# Patient Record
Sex: FEMALE | Race: OTHER | Employment: OTHER | URBAN - METROPOLITAN AREA
[De-identification: names, ages, dates, MRNs, and addresses within clinical notes are randomized per-mention and may not be internally consistent; named-entity substitution may affect disease eponyms.]

---

## 2017-02-06 RX ORDER — PAROXETINE 10 MG/1
TABLET, FILM COATED ORAL
Qty: 30 TABLET | Refills: 5 | Status: SHIPPED | OUTPATIENT
Start: 2017-02-06 | End: 2017-03-07

## 2017-02-15 RX ORDER — PRAMIPEXOLE DIHYDROCHLORIDE 0.12 MG/1
TABLET ORAL
Qty: 30 TABLET | Refills: 5 | Status: SHIPPED | OUTPATIENT
Start: 2017-02-15 | End: 2017-03-14

## 2017-02-15 NOTE — TELEPHONE ENCOUNTER
LOV:8-19  Last Filled:10-27, #60 with 2 refills  Labs:   Future Appointments  Date Time Provider Dawn Arias   3/14/2017 10:40 AM Torri Baumann MD [facility] [place]       Please Advise

## 2017-02-16 RX ORDER — PRAMIPEXOLE DIHYDROCHLORIDE 0.12 MG/1
TABLET ORAL
Qty: 30 TABLET | Refills: 5 | OUTPATIENT
Start: 2017-02-16

## 2017-02-20 RX ORDER — METOPROLOL SUCCINATE 25 MG/1
TABLET, EXTENDED RELEASE ORAL
Qty: 90 TABLET | Refills: 1 | Status: SHIPPED | OUTPATIENT
Start: 2017-02-20 | End: 2017-10-09

## 2017-03-01 ENCOUNTER — HOSPITAL ENCOUNTER (OUTPATIENT)
Dept: MAMMOGRAPHY | Facility: HOSPITAL | Age: 57
Discharge: HOME | End: 2017-03-01
Attending: OBSTETRICS & GYNECOLOGY
Payer: MEDICARE

## 2017-03-01 ENCOUNTER — HOSPITAL ENCOUNTER (OUTPATIENT)
Dept: ULTRASOUND IMAGING | Facility: HOSPITAL | Age: 57
Discharge: HOME | End: 2017-03-01
Attending: OBSTETRICS & GYNECOLOGY
Payer: MEDICARE

## 2017-03-01 DIAGNOSIS — D24.2 FIBROADENOMA OF BREAST, LEFT: ICD-10-CM

## 2017-03-01 PROCEDURE — 76642 ULTRASOUND BREAST LIMITED: CPT

## 2017-03-01 PROCEDURE — 77066 DX MAMMO INCL CAD BI: CPT

## 2017-03-04 ENCOUNTER — HOSPITAL ENCOUNTER (OUTPATIENT)
Age: 57
Discharge: HOME | End: 2017-03-04
Payer: MEDICARE

## 2017-03-04 VITALS
WEIGHT: 165 LBS | DIASTOLIC BLOOD PRESSURE: 78 MMHG | BODY MASS INDEX: 29.23 KG/M2 | RESPIRATION RATE: 18 BRPM | OXYGEN SATURATION: 97 % | TEMPERATURE: 98 F | SYSTOLIC BLOOD PRESSURE: 148 MMHG | HEIGHT: 63 IN | HEART RATE: 55 BPM

## 2017-03-04 DIAGNOSIS — B30.9 ACUTE VIRAL CONJUNCTIVITIS OF RIGHT EYE: ICD-10-CM

## 2017-03-04 PROCEDURE — 99212 OFFICE O/P EST SF 10 MIN: CPT

## 2017-03-04 PROCEDURE — 99203 OFFICE O/P NEW LOW 30 MIN: CPT

## 2017-03-04 RX ORDER — DIPHENHYDRAMINE HCL 25 MG
CAPSULE ORAL
Qty: 1 EACH | Refills: 0 | Status: SHIPPED | OUTPATIENT
Start: 2017-03-04 | End: 2017-06-06

## 2017-03-04 NOTE — ED INITIAL ASSESSMENT (HPI)
Right eye redness and painful onset this morning. Denies any injury/trauma to the eye. Denies any drainage. Woke up with pain in the right eye. Uses glasses no contacts.

## 2017-03-04 NOTE — ED PROVIDER NOTES
Patient Seen in: [facility] Immediate Care In [address] [place] [address]    History   Patient presents with:   Eye Visual Problem (opthalmic)    Stated Complaint: eye pain    HPI    Patient is a 80-year-old female with a history of hypertension and fibromyalgia who mouth. take 1 tablet by oral mouth daily   aspirin (ASPIR-81) 81 MG Oral Tab EC,  Take  by mouth.  take 1 tablet (81MG)  by oral route  every day       Family History   Problem Relation Age of Onset   • Hypertension Father    • Hypertension Mother    • Canc conjunctivitis.   We will treat with artificial tears and have the patient follow-up with her PMD.      Disposition and Plan     Clinical Impression:  Acute viral conjunctivitis of right eye    Disposition:  Discharge    Follow-up:  Milton Man MD  1200

## 2017-03-06 RX ORDER — PANTOPRAZOLE SODIUM 40 MG/1
TABLET, DELAYED RELEASE ORAL
Qty: 90 TABLET | Refills: 1 | Status: SHIPPED | OUTPATIENT
Start: 2017-03-06 | End: 2017-09-06

## 2017-03-07 ENCOUNTER — OFFICE VISIT (OUTPATIENT)
Dept: INTERNAL MEDICINE CLINIC | Facility: CLINIC | Age: 57
End: 2017-03-07

## 2017-03-07 VITALS
WEIGHT: 182.13 LBS | HEART RATE: 57 BPM | SYSTOLIC BLOOD PRESSURE: 135 MMHG | TEMPERATURE: 98 F | DIASTOLIC BLOOD PRESSURE: 77 MMHG | BODY MASS INDEX: 32 KG/M2

## 2017-03-07 DIAGNOSIS — B30.9 ACUTE VIRAL CONJUNCTIVITIS OF RIGHT EYE: Primary | ICD-10-CM

## 2017-03-07 DIAGNOSIS — H53.8 BLURRED VISION: ICD-10-CM

## 2017-03-07 DIAGNOSIS — I10 ESSENTIAL HYPERTENSION: ICD-10-CM

## 2017-03-07 PROCEDURE — G0463 HOSPITAL OUTPT CLINIC VISIT: HCPCS | Performed by: INTERNAL MEDICINE

## 2017-03-07 PROCEDURE — 99214 OFFICE O/P EST MOD 30 MIN: CPT | Performed by: INTERNAL MEDICINE

## 2017-03-07 NOTE — PROGRESS NOTES
HPI:    Patient ID: Nivia Virk is a 64year old female. Patient presents for a re-evaluation after UC visit on 3/4/2017.  As per UC notes: Patient is a 22-year-old female with a history of hypertension and fibromyalgia who complains of 1 day of right Treatments tried: Artificial tears. Review of Systems   Constitutional: Negative for fever and chills. Eyes: Negative for blurred vision, double vision, pain, discharge and redness.         Pt feels like she has a \"foreign body\" in her eyes   Resp 0.125 MG Oral Tab Take one PO Q HS. Disp: 30 tablet Rfl: 5   Pravastatin Sodium 20 MG Oral Tab TAKE 1 TABLET BY MOUTH NIGHTLY. Disp: 90 tablet Rfl: 1   CICLOPIROX 8 % External Solution APPLY 1 APPLICATION TOPICALLY 3 (THREE) TIMES A WEEK.  Disp: 6.6 mL Rfl: eye exam.   Plan:  -Referred patient to Dr. Gracy Daniel, Ophthalmology for further evaluation.       (Berwyn Bence) Essential hypertension  On exam, blood pressure 135/77- controlled. Patient denies associated symptoms such as HA, N/V, or CP.  Patient is currently taki

## 2017-03-10 ENCOUNTER — TELEPHONE (OUTPATIENT)
Dept: OPHTHALMOLOGY | Facility: CLINIC | Age: 57
End: 2017-03-10

## 2017-03-10 NOTE — TELEPHONE ENCOUNTER
pts daughter Joe Mcguire called. Onset last Saturday for Red Eyes. She [facility] in [address] [place] [address] on 3/4/17. Was given artifical eye drops. Helped a little, but not better. Please advise.

## 2017-03-11 ENCOUNTER — OFFICE VISIT (OUTPATIENT)
Dept: OPHTHALMOLOGY | Facility: CLINIC | Age: 57
End: 2017-03-11

## 2017-03-11 DIAGNOSIS — H02.889 MGD (MEIBOMIAN GLAND DYSFUNCTION): Primary | ICD-10-CM

## 2017-03-11 DIAGNOSIS — IMO0002 NUCLEAR CATARACT OF BOTH EYES: ICD-10-CM

## 2017-03-11 PROCEDURE — 92014 COMPRE OPH EXAM EST PT 1/>: CPT | Performed by: OPHTHALMOLOGY

## 2017-03-11 PROCEDURE — 92015 DETERMINE REFRACTIVE STATE: CPT | Performed by: OPHTHALMOLOGY

## 2017-03-11 NOTE — PROGRESS NOTES
Felicitas Weclh is a 64year old female. HPI:     HPI     Consult    Additional comments: Referred by Dr. Brittanie Bennett           Comments   Patient is here for a follow up visit from Urgent Care in [address] [place] [address].   Patient was seen on 3/4/17 due to redness and eye pa Solution Put 1 drop in both eyes every hours while awake for 1-2 days Disp: 1 each Rfl: 0   METOPROLOL SUCCINATE ER 25 MG Oral Tablet 24 Hr TAKE 1 TABLET BY MOUTH ONCE DAILY.  Disp: 90 tablet Rfl: 1   Pramipexole Dihydrochloride 0.125 MG Oral Tab Take one P Follicles, Nasal/temp pinguecula, no injection, no jazz bengal staining     Nasal/temp pinguecula, no injection, no jazz bengal staining    Cornea Clear, no fluorescein stain, oily tear film Clear, no fluorescein stain, oily tear film    Anterior Chamber D

## 2017-03-11 NOTE — ASSESSMENT & PLAN NOTE
Patient is instructed to use warm compresses twice a day to both eyelids forever for ocular comfort. Use OTC artifical tears 2-3 times a day or as needed. May also use Zaditor or Alaway in both eyes up to twice a day for itching.

## 2017-03-11 NOTE — PATIENT INSTRUCTIONS
MGD (meibomian gland dysfunction)  Patient is instructed to use warm compresses twice a day to both eyelids forever for ocular comfort. Use OTC artifical tears 2-3 times a day or as needed.  May also use Zaditor or Alaway in both eyes up to twice a day for

## 2017-03-11 NOTE — ASSESSMENT & PLAN NOTE
Discussed early cataracts with patient. Told patient that cataracts are age appropriate and they are not surgical at this time. No treatment recommended at this time.

## 2017-03-14 ENCOUNTER — OFFICE VISIT (OUTPATIENT)
Dept: RHEUMATOLOGY | Facility: CLINIC | Age: 57
End: 2017-03-14

## 2017-03-14 VITALS
HEART RATE: 65 BPM | HEIGHT: 63 IN | BODY MASS INDEX: 32.11 KG/M2 | DIASTOLIC BLOOD PRESSURE: 67 MMHG | SYSTOLIC BLOOD PRESSURE: 144 MMHG | WEIGHT: 181.19 LBS

## 2017-03-14 DIAGNOSIS — M79.7 FIBROMYALGIA: Primary | ICD-10-CM

## 2017-03-14 DIAGNOSIS — M15.9 PRIMARY OSTEOARTHRITIS INVOLVING MULTIPLE JOINTS: ICD-10-CM

## 2017-03-14 DIAGNOSIS — G47.33 OBSTRUCTIVE SLEEP APNEA SYNDROME: ICD-10-CM

## 2017-03-14 PROCEDURE — G0463 HOSPITAL OUTPT CLINIC VISIT: HCPCS | Performed by: INTERNAL MEDICINE

## 2017-03-14 PROCEDURE — 99213 OFFICE O/P EST LOW 20 MIN: CPT | Performed by: INTERNAL MEDICINE

## 2017-03-14 RX ORDER — GABAPENTIN 400 MG/1
CAPSULE ORAL
Qty: 90 CAPSULE | Refills: 3 | Status: SHIPPED | OUTPATIENT
Start: 2017-03-14 | End: 2018-01-10

## 2017-03-14 NOTE — PROGRESS NOTES
HPI:    Patient ID: Мария Hancock is a 64year old female. HPI Comments: Mauro Dumas is a 51-year-old woman with mild osteoarthritis, rotator cuff tendonitis, depression, severe fatigue, and severe diffuse myofascial pain syndrome.     Lyrica up to 100 mg twic Neck Pain   Pertinent negatives include no chest pain or fever. Knee Pain   Pertinent negatives include no fever. Her past medical history is significant for osteoarthritis. Shoulder Pain   Pertinent negatives include no fever.  Her past medical histo sounds are normal. She exhibits no mass. There is no tenderness. There is no rebound and no guarding. Musculoskeletal: She exhibits no edema. She has severe diffuse myofascial discomfort to palpation. Shoulder motion is painful bilaterally.   There is

## 2017-04-12 RX ORDER — PRAVASTATIN SODIUM 20 MG
TABLET ORAL
Qty: 90 TABLET | Refills: 1 | Status: SHIPPED | OUTPATIENT
Start: 2017-04-12 | End: 2017-10-06

## 2017-06-06 ENCOUNTER — OFFICE VISIT (OUTPATIENT)
Dept: RHEUMATOLOGY | Facility: CLINIC | Age: 57
End: 2017-06-06

## 2017-06-06 ENCOUNTER — HOSPITAL ENCOUNTER (OUTPATIENT)
Dept: GENERAL RADIOLOGY | Age: 57
Discharge: HOME | End: 2017-06-06
Attending: INTERNAL MEDICINE | Admitting: INTERNAL MEDICINE
Payer: MEDICARE

## 2017-06-06 ENCOUNTER — HOSPITAL ENCOUNTER (OUTPATIENT)
Dept: GENERAL RADIOLOGY | Age: 57
Discharge: HOME | End: 2017-06-06
Attending: INTERNAL MEDICINE
Payer: MEDICARE

## 2017-06-06 VITALS
HEIGHT: 63 IN | HEART RATE: 62 BPM | WEIGHT: 182 LBS | DIASTOLIC BLOOD PRESSURE: 63 MMHG | TEMPERATURE: 98 F | BODY MASS INDEX: 32.25 KG/M2 | SYSTOLIC BLOOD PRESSURE: 135 MMHG

## 2017-06-06 DIAGNOSIS — M25.562 CHRONIC PAIN OF BOTH KNEES: ICD-10-CM

## 2017-06-06 DIAGNOSIS — M25.561 CHRONIC PAIN OF BOTH KNEES: ICD-10-CM

## 2017-06-06 DIAGNOSIS — M15.9 PRIMARY OSTEOARTHRITIS INVOLVING MULTIPLE JOINTS: Primary | ICD-10-CM

## 2017-06-06 DIAGNOSIS — G89.29 CHRONIC PAIN OF BOTH KNEES: ICD-10-CM

## 2017-06-06 DIAGNOSIS — M15.9 PRIMARY OSTEOARTHRITIS INVOLVING MULTIPLE JOINTS: ICD-10-CM

## 2017-06-06 DIAGNOSIS — M79.7 FIBROMYALGIA: ICD-10-CM

## 2017-06-06 PROCEDURE — G0463 HOSPITAL OUTPT CLINIC VISIT: HCPCS | Performed by: INTERNAL MEDICINE

## 2017-06-06 PROCEDURE — 73560 X-RAY EXAM OF KNEE 1 OR 2: CPT | Performed by: INTERNAL MEDICINE

## 2017-06-06 PROCEDURE — 99213 OFFICE O/P EST LOW 20 MIN: CPT | Performed by: INTERNAL MEDICINE

## 2017-06-06 NOTE — PROGRESS NOTES
HPI:    Patient ID: Ayanna Jacques is a 64year old female. HPI Comments: Mino Jones is a 15-year-old woman with mild osteoarthritis, rotator cuff tendonitis, depression, severe fatigue, and severe diffuse myofascial pain syndrome.     Lyrica up to 100 mg twic pain, chest pain, chills, diaphoresis, fever or rash. Review of Systems   Constitutional: Positive for fatigue. Negative for fever, chills and diaphoresis. Respiratory: Negative for shortness of breath. Cardiovascular: Negative for chest pain. Skin: No rash noted. ASSESSMENT/PLAN:   No orders of the defined types were placed in this encounter. 1. Osteoarthritis. She will continue to increase exercises that she learned in physical therapy.  She will increase her time on the bike

## 2017-06-10 ENCOUNTER — OFFICE VISIT (OUTPATIENT)
Dept: INTERNAL MEDICINE CLINIC | Facility: CLINIC | Age: 57
End: 2017-06-10

## 2017-06-10 VITALS
RESPIRATION RATE: 18 BRPM | DIASTOLIC BLOOD PRESSURE: 69 MMHG | HEIGHT: 63 IN | SYSTOLIC BLOOD PRESSURE: 134 MMHG | BODY MASS INDEX: 32.07 KG/M2 | HEART RATE: 58 BPM | TEMPERATURE: 98 F | WEIGHT: 181 LBS

## 2017-06-10 DIAGNOSIS — Z91.09 ENVIRONMENTAL ALLERGIES: Primary | ICD-10-CM

## 2017-06-10 DIAGNOSIS — E66.01 MORBID OBESITY, UNSPECIFIED OBESITY TYPE (HCC): ICD-10-CM

## 2017-06-10 PROCEDURE — G0463 HOSPITAL OUTPT CLINIC VISIT: HCPCS | Performed by: INTERNAL MEDICINE

## 2017-06-10 PROCEDURE — 99214 OFFICE O/P EST MOD 30 MIN: CPT | Performed by: INTERNAL MEDICINE

## 2017-06-10 RX ORDER — LEVOCETIRIZINE DIHYDROCHLORIDE 5 MG/1
5 TABLET, FILM COATED ORAL EVERY EVENING
Qty: 30 TABLET | Refills: 1 | Status: SHIPPED | OUTPATIENT
Start: 2017-06-10 | End: 2017-10-09

## 2017-06-10 NOTE — PROGRESS NOTES
HPI:    Patient ID: Felicitas Welch is a 64year old female. Sore Throat   This is a new problem. The current episode started 1 to 4 weeks ago. There has been no fever. Pertinent negatives include no diarrhea or shortness of breath.  She has tried nothing Dihydrochloride (XYZAL) 5 MG Oral Tab Take 1 tablet (5 mg total) by mouth every evening. Disp: 30 tablet Rfl: 1   PRAVASTATIN SODIUM 20 MG Oral Tab TAKE 1 TABLET BY MOUTH NIGHTLY. Disp: 90 tablet Rfl: 1   gabapentin 400 MG Oral Cap Take one PO Q HS.  Disp: vitals reviewed. 06/10/17  1148   BP: 134/69   Pulse: 58   Temp: 98 °F (36.7 °C)   TempSrc: Oral   Resp: 18   Height: 5' 3\" (1.6 m)   Weight: 181 lb (82.101 kg)         Body mass index is 32.07 kg/(m^2).            ASSESSMENT/PLAN:   (Z91.09) Environ

## 2017-06-15 ENCOUNTER — OFFICE VISIT (OUTPATIENT)
Dept: PHYSICAL THERAPY | Age: 57
End: 2017-06-15
Attending: INTERNAL MEDICINE
Payer: MEDICARE

## 2017-06-15 DIAGNOSIS — M15.9 PRIMARY OSTEOARTHRITIS INVOLVING MULTIPLE JOINTS: Primary | ICD-10-CM

## 2017-06-15 PROCEDURE — 97116 GAIT TRAINING THERAPY: CPT

## 2017-06-15 PROCEDURE — 97162 PT EVAL MOD COMPLEX 30 MIN: CPT

## 2017-06-22 ENCOUNTER — OFFICE VISIT (OUTPATIENT)
Dept: PHYSICAL THERAPY | Age: 57
End: 2017-06-22
Attending: INTERNAL MEDICINE
Payer: MEDICARE

## 2017-06-22 DIAGNOSIS — M15.9 PRIMARY OSTEOARTHRITIS INVOLVING MULTIPLE JOINTS: Primary | ICD-10-CM

## 2017-06-22 PROCEDURE — 97110 THERAPEUTIC EXERCISES: CPT

## 2017-06-22 NOTE — PROGRESS NOTES
Dx: Primary osteoarthritis involving multiple joints (M15.0)  Authorized # of Visits:  2/10         Next MD visit: none scheduled  Fall Risk: standard         Precautions: n/a           Medication Changes since last visit?: No  Subjective: PAS 6/10.  Pt rep

## 2017-06-27 ENCOUNTER — OFFICE VISIT (OUTPATIENT)
Dept: PHYSICAL THERAPY | Age: 57
End: 2017-06-27
Attending: INTERNAL MEDICINE
Payer: MEDICARE

## 2017-06-27 PROCEDURE — 97110 THERAPEUTIC EXERCISES: CPT

## 2017-06-27 NOTE — PROGRESS NOTES
Dx: Primary osteoarthritis involving multiple joints (M15.0)  Authorized # of Visits:  3/10         Next MD visit: (7/6/17)  Fall Risk: standard         Precautions: n/a           Medication Changes since last visit?: No  Subjective: PAS 6/10.  Pt reports n

## 2017-06-29 ENCOUNTER — OFFICE VISIT (OUTPATIENT)
Dept: PHYSICAL THERAPY | Age: 57
End: 2017-06-29
Attending: INTERNAL MEDICINE
Payer: MEDICARE

## 2017-06-29 DIAGNOSIS — M15.9 PRIMARY OSTEOARTHRITIS INVOLVING MULTIPLE JOINTS: ICD-10-CM

## 2017-06-29 PROCEDURE — 97110 THERAPEUTIC EXERCISES: CPT

## 2017-06-29 NOTE — PROGRESS NOTES
Dx: Primary osteoarthritis involving multiple joints (M15.0)  Authorized # of Visits:  4/10         Next MD visit: (7/6/17)  Fall Risk: standard         Precautions: n/a           Medication Changes since last visit?: No  Subjective: Pt reports a \"bad day

## 2017-07-06 ENCOUNTER — OFFICE VISIT (OUTPATIENT)
Dept: RHEUMATOLOGY | Facility: CLINIC | Age: 57
End: 2017-07-06

## 2017-07-06 ENCOUNTER — OFFICE VISIT (OUTPATIENT)
Dept: PHYSICAL THERAPY | Age: 57
End: 2017-07-06
Attending: INTERNAL MEDICINE
Payer: MEDICARE

## 2017-07-06 VITALS
HEART RATE: 76 BPM | HEIGHT: 63 IN | SYSTOLIC BLOOD PRESSURE: 142 MMHG | DIASTOLIC BLOOD PRESSURE: 74 MMHG | TEMPERATURE: 98 F | WEIGHT: 182 LBS | BODY MASS INDEX: 32.25 KG/M2

## 2017-07-06 DIAGNOSIS — M79.7 FIBROMYALGIA: Primary | ICD-10-CM

## 2017-07-06 DIAGNOSIS — R53.83 FATIGUE, UNSPECIFIED TYPE: ICD-10-CM

## 2017-07-06 PROCEDURE — 99214 OFFICE O/P EST MOD 30 MIN: CPT | Performed by: INTERNAL MEDICINE

## 2017-07-06 PROCEDURE — G0463 HOSPITAL OUTPT CLINIC VISIT: HCPCS | Performed by: INTERNAL MEDICINE

## 2017-07-06 PROCEDURE — 97110 THERAPEUTIC EXERCISES: CPT

## 2017-07-06 RX ORDER — MELOXICAM 15 MG/1
15 TABLET ORAL DAILY
Qty: 30 TABLET | Refills: 0 | Status: SHIPPED | OUTPATIENT
Start: 2017-07-06 | End: 2017-10-09

## 2017-07-06 RX ORDER — AMITRIPTYLINE HYDROCHLORIDE 10 MG/1
10 TABLET, FILM COATED ORAL NIGHTLY
Qty: 30 TABLET | Refills: 1 | Status: SHIPPED | OUTPATIENT
Start: 2017-07-06 | End: 2017-10-09

## 2017-07-06 NOTE — PROGRESS NOTES
Dx: Primary osteoarthritis involving multiple joints (M15.0)  Authorized # of Visits:  5/10         Next MD visit: (7/6/17)  Fall Risk: standard         Precautions: n/a           Medication Changes since last visit?: No    Subjective: Pt saw RA specialist functional mobility     Skilled Services: manual, PREs, HEP progression       Charges: ex 3       Total Timed Treatment: 40 min  Total Treatment Time: 42 min

## 2017-07-06 NOTE — PROGRESS NOTES
Ирина Joseph is a 64year old female who presents for Patient presents with:  Fibromyalgia Syndrome: C/O pain all over her body. Current pain  9/10. Knee Pain: Bilateral knee pain L>R. Current pain left knee 8/10,Right knee 4/10. Completed 4 session of phys TAKE 1 TABLET BY MOUTH ONCE DAILY. Disp: 90 tablet Rfl: 1   PARoxetine HCl (PAXIL) 10 MG Oral Tab TAKE 1 TABLET BY MOUTH EVERY MORNING. Disp: 30 tablet Rfl: 3   Vitamin B-12 (VITAMIN B12) 1000 MCG Oral Tab Take  by mouth.  take 1 tablet by oral mouth daily heart disease, can have pain with her left hand with walking - she had heart testing with dr. Tawanda Wilson -   RS: has SOB, some Cough, No Pleurtic pain,   GI: No nausea, no vomiiting, no abominal pain, no hx of ulcer, heartburn, no dyshpagia, no BRBPR or melen TOTAL      2.5 - 3.7 g/dL 3.8 (H)   A/G RATIO      1.0 - 2.0 0.9 (L)   GFR/NON-      >60 >60   GFR/      >60 >60   HDL Cholesterol      mg/dL 42   CHOLESTEROL, TOTAL      110 - 200 mg/dL 160   Triglycerides      1 - 149 mg/d dizziness. - tried cyclobenzaprine in tihe past as well - she can't remember     2. Knee pain - no oa on xrays   She is finsihing physical therapy -   Try adding meloxicam 15mg adya     Summary:  1. Cont. Gabapentin 400mg at night   2.  Add meloxicam 15mg

## 2017-07-06 NOTE — PATIENT INSTRUCTIONS
1. Cont. Gabapentin 400mg at night   2. Add meloxicam 15mg a day - for knee pain   3. Try amitriptyline 10mg at night   4. Finish physical therpay for her knees   5. Return to clinic in 1 months.

## 2017-07-07 ENCOUNTER — TELEPHONE (OUTPATIENT)
Dept: INTERNAL MEDICINE CLINIC | Facility: CLINIC | Age: 57
End: 2017-07-07

## 2017-07-11 ENCOUNTER — OFFICE VISIT (OUTPATIENT)
Dept: PHYSICAL THERAPY | Age: 57
End: 2017-07-11
Attending: INTERNAL MEDICINE
Payer: MEDICARE

## 2017-07-11 PROCEDURE — 97110 THERAPEUTIC EXERCISES: CPT

## 2017-07-11 NOTE — PROGRESS NOTES
Dx: Primary osteoarthritis involving multiple joints (M15.0)  Authorized # of Visits:  6/10         Next MD visit: (7/6/17)  Fall Risk: standard         Precautions: n/a           Medication Changes since last visit?: No    Subjective: Pt reports not [place] stability, strength, balance and functional mobility     Skilled Services: manual, PREs, HEP progression       Charges: ex 3       Total Timed Treatment: 40 min  Total Treatment Time: 42 min

## 2017-07-13 ENCOUNTER — OFFICE VISIT (OUTPATIENT)
Dept: PHYSICAL THERAPY | Age: 57
End: 2017-07-13
Attending: INTERNAL MEDICINE
Payer: MEDICARE

## 2017-07-13 PROCEDURE — 97110 THERAPEUTIC EXERCISES: CPT

## 2017-07-13 NOTE — PROGRESS NOTES
Dx: Primary osteoarthritis involving multiple joints (M15.0)  Authorized # of Visits:  7/10         Next MD visit: (7/6/17)  Fall Risk: standard         Precautions: n/a           Medication Changes since last visit?: No    Subjective: Pt reports that the Time: 42 min

## 2017-07-18 ENCOUNTER — OFFICE VISIT (OUTPATIENT)
Dept: PHYSICAL THERAPY | Age: 57
End: 2017-07-18
Attending: INTERNAL MEDICINE
Payer: MEDICARE

## 2017-07-18 PROCEDURE — 97110 THERAPEUTIC EXERCISES: CPT

## 2017-07-18 NOTE — PROGRESS NOTES
Dx: Primary osteoarthritis involving multiple joints (M15.0)  Authorized # of Visits:  8/10         Next MD visit: (7/6/17)  Fall Risk: standard         Precautions: n/a           Medication Changes since last visit?: No    Subjective: Overall, the pt repo and functional mobility     Skilled Services: manual, PREs, HEP progression       Charges: ex 3       Total Timed Treatment: 40 min  Total Treatment Time: 42 min

## 2017-07-20 ENCOUNTER — OFFICE VISIT (OUTPATIENT)
Dept: PHYSICAL THERAPY | Age: 57
End: 2017-07-20
Attending: INTERNAL MEDICINE
Payer: MEDICARE

## 2017-07-20 PROCEDURE — 97110 THERAPEUTIC EXERCISES: CPT

## 2017-07-20 NOTE — PROGRESS NOTES
Dx: Primary osteoarthritis involving multiple joints (M15.0)  Authorized # of Visits:  9/10         Next MD visit: (7/6/17)  Fall Risk: standard         Precautions: n/a           Medication Changes since last visit?: No    Subjective: Pt reports feeling g Total Timed Treatment: 40 min  Total Treatment Time: 42 min

## 2017-08-01 ENCOUNTER — OFFICE VISIT (OUTPATIENT)
Dept: PHYSICAL THERAPY | Age: 57
End: 2017-08-01
Attending: INTERNAL MEDICINE
Payer: MEDICARE

## 2017-08-01 PROCEDURE — 97110 THERAPEUTIC EXERCISES: CPT

## 2017-08-01 NOTE — PROGRESS NOTES
DISCHARGE SUMMARY   Dx: Primary osteoarthritis involving multiple joints (M15.0)  Authorized # of Visits:  10/10         Next MD visit:   Fall Risk: standard         Precautions: n/a           Medication Changes since last visit?: No    Subjective: Pt repo stretch by therapist  (moderate loss) 4 x 15 cts     Goals:   1) The patient will be safe and independent with a progressive HEP necessary to manage symptoms during ADLs-MET    2) The patient will demonstrate 2/3 to 1 grade increase in L knee extension str

## 2017-08-03 ENCOUNTER — OFFICE VISIT (OUTPATIENT)
Dept: RHEUMATOLOGY | Facility: CLINIC | Age: 57
End: 2017-08-03

## 2017-08-03 VITALS
DIASTOLIC BLOOD PRESSURE: 64 MMHG | HEIGHT: 63 IN | WEIGHT: 183.81 LBS | BODY MASS INDEX: 32.57 KG/M2 | HEART RATE: 62 BPM | SYSTOLIC BLOOD PRESSURE: 117 MMHG

## 2017-08-03 DIAGNOSIS — M25.562 PAIN IN BOTH KNEES, UNSPECIFIED CHRONICITY: ICD-10-CM

## 2017-08-03 DIAGNOSIS — M79.7 FIBROMYALGIA: Primary | ICD-10-CM

## 2017-08-03 DIAGNOSIS — M25.561 PAIN IN BOTH KNEES, UNSPECIFIED CHRONICITY: ICD-10-CM

## 2017-08-03 PROCEDURE — 99214 OFFICE O/P EST MOD 30 MIN: CPT | Performed by: INTERNAL MEDICINE

## 2017-08-03 PROCEDURE — G0463 HOSPITAL OUTPT CLINIC VISIT: HCPCS | Performed by: INTERNAL MEDICINE

## 2017-08-03 RX ORDER — DULOXETIN HYDROCHLORIDE 20 MG/1
20 CAPSULE, DELAYED RELEASE ORAL DAILY
Qty: 30 CAPSULE | Refills: 1 | Status: SHIPPED | OUTPATIENT
Start: 2017-08-03 | End: 2017-10-09

## 2017-08-03 NOTE — PATIENT INSTRUCTIONS
1. Cont. Gabapentin 400mg at night   2. STOP paroxetine  3. TRY DULOXETINE 20mg a day   4. Diclofenac 1 % gel  5. Return to clinic in 1-2  months. 6. PT maintenance for knees   Duloxetine delayed-release capsules  ¿Qué es pk medicamento?   [facility] coordinación  · enrojecimiento, formación de ampollas, descamación o distensión de la piel, inclusive dentro de la boca  · dolor en la región abdominal superior derecha  · convulsiones  · ideas suicidas u otros cambios de humor  · problemas de concentració Quentin  · teofilina  · tramadol  · triptófano  ¿Qué sucede si me olvido de aaliyah dosis? Si olvida aaliyah dosis, tómela lo antes posible. Si es kelly la hora de la próxima dosis, tome sólo luz marina dosis. No tome dosis adicionales o dobles.   ¿Dónde satinder guardar mi medi comuníquese con moncada profesional de [employer]. Puede experimentar mareos o somnolencia. No conduzca ni utilice maquinaria ni ruben nada que le exija permanecer en estado de alerta hasta que sepa cómo le afecta pk medicamento.  No se siente ni se ponga de pie

## 2017-08-03 NOTE — PROGRESS NOTES
Tena Ormond is a 64year old female who presents for Patient presents with:  Osteoarthritis  Fibromyalgia Syndrome  Joint Pain  Muscle Pain  . HPI:     I had the pleasure of seeing Tena Ormond on 7/6/2017 for evaluation.      She is a pleasant 64 year tablet Rfl: 1   Levocetirizine Dihydrochloride (XYZAL) 5 MG Oral Tab Take 1 tablet (5 mg total) by mouth every evening. Disp: 30 tablet Rfl: 1   PRAVASTATIN SODIUM 20 MG Oral Tab TAKE 1 TABLET BY MOUTH NIGHTLY.  Disp: 90 tablet Rfl: 1   gabapentin 400 MG Or Used                      Alcohol use:  No               Lives with daughter        REVIEW OF SYSTEMS:   Review Of Systems:  Fatigue  Constitutional:No fever, no change in weight or appetitie  Derm: No rashes, no oral ulcers, no alopecia, no photosensitivit Total      22 - 32 mmol/L 27   BUN      8 - 20 mg/dL 17   CREATININE      0.50 - 1.50 mg/dL 0.72   BUN/CREA RATIO      10.0 - 20.0 23.6 (H)   ANION GAP      0 - 18 4   CALCIUM      8.5 - 10.5 mg/dL 8.7   CALCULATED OSMOLALITY      275 - 295 mOsm/kg 289   A with:  Osteoarthritis  Fibromyalgia Syndrome  Joint Pain  Muscle Pain      1.fibromyalgia -   - re-establishing care   -  Try ck , aldolase - in the future - check labs on next visit -   On gabapentin 400mg at night.   - higher doses of gabapentin didn't h

## 2017-08-14 NOTE — TELEPHONE ENCOUNTER
Patient is calling regarding status of rx. Patient is out of medication. Patient also would like to know if Dr. Stephon Cedillo send the rx for a 3 month supply.

## 2017-08-15 ENCOUNTER — OFFICE VISIT (OUTPATIENT)
Dept: SURGERY | Facility: CLINIC | Age: 57
End: 2017-08-15

## 2017-08-15 VITALS
SYSTOLIC BLOOD PRESSURE: 144 MMHG | HEART RATE: 57 BPM | DIASTOLIC BLOOD PRESSURE: 73 MMHG | HEIGHT: 62 IN | WEIGHT: 181.5 LBS | BODY MASS INDEX: 33.4 KG/M2 | RESPIRATION RATE: 16 BRPM | OXYGEN SATURATION: 97 %

## 2017-08-15 DIAGNOSIS — E66.9 OBESITY (BMI 30-39.9): ICD-10-CM

## 2017-08-15 DIAGNOSIS — I10 ESSENTIAL HYPERTENSION: ICD-10-CM

## 2017-08-15 DIAGNOSIS — R53.83 FATIGUE, UNSPECIFIED TYPE: Primary | ICD-10-CM

## 2017-08-15 DIAGNOSIS — M15.9 PRIMARY OSTEOARTHRITIS INVOLVING MULTIPLE JOINTS: ICD-10-CM

## 2017-08-15 DIAGNOSIS — M79.7 FIBROMYALGIA: ICD-10-CM

## 2017-08-15 DIAGNOSIS — R60.0 LOWER EXTREMITY EDEMA: ICD-10-CM

## 2017-08-15 DIAGNOSIS — R12 HEART BURN: ICD-10-CM

## 2017-08-15 DIAGNOSIS — G47.33 OSA (OBSTRUCTIVE SLEEP APNEA): ICD-10-CM

## 2017-08-15 PROCEDURE — 99204 OFFICE O/P NEW MOD 45 MIN: CPT | Performed by: INTERNAL MEDICINE

## 2017-08-15 RX ORDER — PAROXETINE 10 MG/1
TABLET, FILM COATED ORAL
Qty: 30 TABLET | Refills: 5 | Status: SHIPPED | OUTPATIENT
Start: 2017-08-15 | End: 2018-02-11

## 2017-08-15 RX ORDER — HYDROCHLOROTHIAZIDE 12.5 MG/1
12.5 CAPSULE, GELATIN COATED ORAL DAILY
Qty: 30 CAPSULE | Refills: 1 | Status: SHIPPED | OUTPATIENT
Start: 2017-08-15 | End: 2017-10-09

## 2017-08-15 NOTE — PROGRESS NOTES
[facility] Consultation Note       Date of Consult:  8/15/2017    Patient:  Hermann Fisher  :      1960  MRN:      [id number]    Referring Provider: Dr. Yadi Lenz       Chief Complaint:  Patient presents with:  Consult  Weight La tablet by oral mouth daily Disp:  Rfl:    aspirin (ASPIR-81) 81 MG Oral Tab EC Take  by mouth. take 1 tablet (81MG)  by oral route  every day Disp:  Rfl:    DULoxetine HCl 20 MG Oral Cap DR Particles Take 1 capsule (20 mg total) by mouth daily.  Disp: 30 ca Hypertension Father    • Hypertension Mother    • Cancer Sister    • Hypertension Sister    • Breast Cancer Sister 48   • Hypertension Sister    • Cancer Sister    • Polyps Sister    • Breast Cancer Sister 50   • Other[other] [OTHER] Maternal Grandfather regular rate and rhythm  Abdomen: soft, non-tender; bowel sounds normal; no masses,  no organomegaly  Extremities: edema trace, good movement both legs.  some pain   Pulses: 2+ and symmetric  Skin: Skin color, texture, turgor normal. No rashes or lesions

## 2017-08-16 ENCOUNTER — OFFICE VISIT (OUTPATIENT)
Dept: PHYSICAL THERAPY | Facility: HOSPITAL | Age: 57
End: 2017-08-16
Attending: INTERNAL MEDICINE
Payer: MEDICARE

## 2017-08-17 ENCOUNTER — TELEPHONE (OUTPATIENT)
Dept: INTERNAL MEDICINE CLINIC | Facility: CLINIC | Age: 57
End: 2017-08-17

## 2017-08-17 DIAGNOSIS — M25.562 ACUTE PAIN OF LEFT KNEE: Primary | ICD-10-CM

## 2017-08-17 NOTE — TELEPHONE ENCOUNTER
[place] SPEAKER -  Pt calling requesting referral for Ortho for continued left knee issues despite PT. Please call when approved.

## 2017-08-17 NOTE — PROGRESS NOTES
Please note: patient arrived to therapy with daughter-in law with a new order for a continuation of therapy(order/referral from different MD).  Patient and patient's daughter-in-law stated that she wanted to see an orthopedic doctor because even after her r

## 2017-08-17 NOTE — TELEPHONE ENCOUNTER
Please do not send requests for referrals without the reason for the visit at least.We can generate the referral with code. .Please callthe patient the reason or to make appt. For evaluation. Thanks.

## 2017-08-24 ENCOUNTER — TELEPHONE (OUTPATIENT)
Dept: RHEUMATOLOGY | Facility: CLINIC | Age: 57
End: 2017-08-24

## 2017-08-30 RX ORDER — METOPROLOL SUCCINATE 25 MG/1
TABLET, EXTENDED RELEASE ORAL
Qty: 30 TABLET | Refills: 2 | Status: SHIPPED | OUTPATIENT
Start: 2017-08-30 | End: 2017-11-29

## 2017-09-01 ENCOUNTER — APPOINTMENT (OUTPATIENT)
Dept: PHYSICAL THERAPY | Facility: HOSPITAL | Age: 57
End: 2017-09-01
Attending: INTERNAL MEDICINE
Payer: MEDICARE

## 2017-09-06 ENCOUNTER — APPOINTMENT (OUTPATIENT)
Dept: PHYSICAL THERAPY | Facility: HOSPITAL | Age: 57
End: 2017-09-06
Attending: INTERNAL MEDICINE
Payer: MEDICARE

## 2017-09-07 RX ORDER — PANTOPRAZOLE SODIUM 40 MG/1
TABLET, DELAYED RELEASE ORAL
Qty: 90 TABLET | Refills: 0 | Status: SHIPPED | OUTPATIENT
Start: 2017-09-07 | End: 2017-12-04

## 2017-09-07 NOTE — TELEPHONE ENCOUNTER
Refill Protocol Appointment Criteria  · Appointment scheduled in the past 12 months or in the next 3 months  Recent Outpatient Visits            3 weeks ago     [facility] [place], [place]    Office Visit    3 weeks ago Fatigue, u

## 2017-09-08 ENCOUNTER — OFFICE VISIT (OUTPATIENT)
Dept: ORTHOPEDICS CLINIC | Facility: CLINIC | Age: 57
End: 2017-09-08

## 2017-09-08 DIAGNOSIS — M25.562 ACUTE PAIN OF LEFT KNEE: Primary | ICD-10-CM

## 2017-09-08 PROCEDURE — 99203 OFFICE O/P NEW LOW 30 MIN: CPT | Performed by: ORTHOPAEDIC SURGERY

## 2017-09-08 PROCEDURE — G0463 HOSPITAL OUTPT CLINIC VISIT: HCPCS | Performed by: ORTHOPAEDIC SURGERY

## 2017-09-08 NOTE — PROGRESS NOTES
9/8/2017  Eron Hollingsworth  68/1960  64year old   female  Romy Virk MD    HPI:   Patient presents with:  Knee Pain: Left - language line called and spoke with [facility] ID # [id number] - onset abot 5 mo ago - no injury - has pain on the medial aspect 5 MG Oral Tab Take 1 tablet (5 mg total) by mouth every evening. Disp: 30 tablet Rfl: 1   PRAVASTATIN SODIUM 20 MG Oral Tab TAKE 1 TABLET BY MOUTH NIGHTLY. Disp: 90 tablet Rfl: 1   gabapentin 400 MG Oral Cap Take one PO Q HS.  (Patient taking differently: 3 reviewed by me today with pertinent positives and negatives listed in the HPI. EXAM:   There were no vitals taken for this visit. The patient is awake and oriented x 3 and overall well appearing. The patient has normal mood.   The patient is non-tender

## 2017-09-13 ENCOUNTER — APPOINTMENT (OUTPATIENT)
Dept: PHYSICAL THERAPY | Facility: HOSPITAL | Age: 57
End: 2017-09-13
Attending: INTERNAL MEDICINE
Payer: MEDICARE

## 2017-09-20 ENCOUNTER — APPOINTMENT (OUTPATIENT)
Dept: PHYSICAL THERAPY | Facility: HOSPITAL | Age: 57
End: 2017-09-20
Attending: INTERNAL MEDICINE
Payer: MEDICARE

## 2017-09-21 ENCOUNTER — HOSPITAL ENCOUNTER (OUTPATIENT)
Dept: MRI IMAGING | Facility: HOSPITAL | Age: 57
Discharge: HOME | End: 2017-09-21
Attending: ORTHOPAEDIC SURGERY
Payer: MEDICARE

## 2017-09-21 DIAGNOSIS — M25.562 ACUTE PAIN OF LEFT KNEE: ICD-10-CM

## 2017-09-21 PROCEDURE — 73721 MRI JNT OF LWR EXTRE W/O DYE: CPT | Performed by: ORTHOPAEDIC SURGERY

## 2017-09-26 ENCOUNTER — APPOINTMENT (OUTPATIENT)
Dept: LAB | Facility: HOSPITAL | Age: 57
End: 2017-09-26
Attending: INTERNAL MEDICINE
Payer: MEDICARE

## 2017-09-26 ENCOUNTER — OFFICE VISIT (OUTPATIENT)
Dept: SURGERY | Facility: CLINIC | Age: 57
End: 2017-09-26

## 2017-09-26 VITALS
RESPIRATION RATE: 16 BRPM | WEIGHT: 179 LBS | SYSTOLIC BLOOD PRESSURE: 132 MMHG | BODY MASS INDEX: 32.94 KG/M2 | HEART RATE: 60 BPM | DIASTOLIC BLOOD PRESSURE: 70 MMHG | OXYGEN SATURATION: 98 % | HEIGHT: 62 IN

## 2017-09-26 DIAGNOSIS — F32.2 SEVERE SINGLE CURRENT EPISODE OF MAJOR DEPRESSIVE DISORDER, WITHOUT PSYCHOTIC FEATURES (HCC): ICD-10-CM

## 2017-09-26 DIAGNOSIS — E66.9 OBESITY (BMI 30-39.9): ICD-10-CM

## 2017-09-26 DIAGNOSIS — G47.33 OSA (OBSTRUCTIVE SLEEP APNEA): ICD-10-CM

## 2017-09-26 DIAGNOSIS — I10 ESSENTIAL HYPERTENSION: ICD-10-CM

## 2017-09-26 DIAGNOSIS — I10 ESSENTIAL HYPERTENSION: Primary | ICD-10-CM

## 2017-09-26 PROCEDURE — 93005 ELECTROCARDIOGRAM TRACING: CPT

## 2017-09-26 PROCEDURE — 99214 OFFICE O/P EST MOD 30 MIN: CPT | Performed by: INTERNAL MEDICINE

## 2017-09-26 PROCEDURE — 93010 ELECTROCARDIOGRAM REPORT: CPT | Performed by: INTERNAL MEDICINE

## 2017-09-26 RX ORDER — PHENTERMINE HYDROCHLORIDE 15 MG/1
15 CAPSULE ORAL EVERY MORNING
Qty: 30 CAPSULE | Refills: 1 | Status: SHIPPED | OUTPATIENT
Start: 2017-09-26 | End: 2017-11-29 | Stop reason: ALTCHOICE

## 2017-09-26 NOTE — PROGRESS NOTES
[address], [facility]  [address] [facility] [address] [facility] [address]  Dept: [phone]     Date:   2017    Patient:  Raine Arana  :      1960  MRN:      [id number]    Chief Complaint:  Pa MG Oral Cap DR Particles Take 1 capsule (20 mg total) by mouth daily. Disp: 30 capsule Rfl: 1   Diclofenac Sodium 1 % Transdermal Gel Apply 4 g topically 4 (four) times daily.  Disp: 1 Tube Rfl: 0   Meloxicam 15 MG Oral Tab Take 1 tablet (15 mg total) by mo None on file     Surgical History:  Past Surgical History:  2006: CARPAL TUNNEL RELEASE Right  10-14-14: CARPAL TUNNEL RELEASE Left      Comment: Endoscopic  04-: ELECTROCARDIOGRAM, COMPLETE      Comment: scanned to media tab  Family History:    F positive for fatigue  Respiratory: positive for dyspnea on exertion  Cardiovascular: negative  Gastrointestinal: negative  Musculoskeletal:positive for arthralgias and back pain  Neurological: negative  Behavioral/Psych: positive for anxiety and depression somewhat better of at least no worse on current medication. She was encouraged to avoid caffeine products, elevated head of bed and not eat for 1 hour before bedtime. No interval changes were made in her anti-reflux medications.      NON SUICIDAL DEPRESSION

## 2017-09-27 ENCOUNTER — APPOINTMENT (OUTPATIENT)
Dept: PHYSICAL THERAPY | Facility: HOSPITAL | Age: 57
End: 2017-09-27
Attending: INTERNAL MEDICINE
Payer: MEDICARE

## 2017-10-04 ENCOUNTER — APPOINTMENT (OUTPATIENT)
Dept: PHYSICAL THERAPY | Facility: HOSPITAL | Age: 57
End: 2017-10-04
Attending: INTERNAL MEDICINE
Payer: MEDICARE

## 2017-10-09 ENCOUNTER — OFFICE VISIT (OUTPATIENT)
Dept: RHEUMATOLOGY | Facility: CLINIC | Age: 57
End: 2017-10-09

## 2017-10-09 VITALS
HEIGHT: 62 IN | DIASTOLIC BLOOD PRESSURE: 63 MMHG | WEIGHT: 180 LBS | BODY MASS INDEX: 33.13 KG/M2 | HEART RATE: 60 BPM | SYSTOLIC BLOOD PRESSURE: 102 MMHG

## 2017-10-09 DIAGNOSIS — M79.7 FIBROMYALGIA: Primary | ICD-10-CM

## 2017-10-09 DIAGNOSIS — M25.562 PAIN IN BOTH KNEES, UNSPECIFIED CHRONICITY: ICD-10-CM

## 2017-10-09 DIAGNOSIS — M25.561 PAIN IN BOTH KNEES, UNSPECIFIED CHRONICITY: ICD-10-CM

## 2017-10-09 PROCEDURE — G0463 HOSPITAL OUTPT CLINIC VISIT: HCPCS | Performed by: INTERNAL MEDICINE

## 2017-10-09 PROCEDURE — 99213 OFFICE O/P EST LOW 20 MIN: CPT | Performed by: INTERNAL MEDICINE

## 2017-10-09 NOTE — PROGRESS NOTES
Estrellita Mcnamara is a 64year old female who presents for Patient presents with:  Osteoarthritis: joint and muscle pain  Fibromyalgia Syndrome  . HPI:     I had the pleasure of seeing Estrellita Mcnamara on 7/6/2017 for evaluation.      She is a pleasant 64 year o (81.6 kg)  09/26/17 : 179 lb (81.2 kg)    Body mass index is 32.92 kg/m². Current Outpatient Prescriptions:  Phentermine HCl 15 MG Oral Cap Take 1 capsule (15 mg total) by mouth every morning.  Disp: 30 capsule Rfl: 1   PANTOPRAZOLE SODIUM 40 MG Oral Sister    • Polyps Sister    • Breast Cancer Sister 50   • Other[other] [OTHER] Maternal Grandfather    • Other[other] [OTHER] Paternal Grandmother    • Other[other] [OTHER] Paternal Grandfather    • Macular degeneration Neg    • Glaucoma Neg       Social hands.   EXTREMITIES: no cyanosis, clubbing or edema  NEURO: intact touch, 5/5 ue and le strength    Component      Latest Ref Rng & Units 9/27/2016   Glucose      70 - 99 mg/dL 93   Sodium      136 - 144 mmol/L 139   Potassium      3.3 - 5.1 mmol/L 4.1 Negative  Negative   Anti Double Strand DNA      <10  <10   RHEUMATOID FACTOR      <11 IU/mL <5.0 6   C-Citrullinated Peptide IgG AB      0.0 - 6.9 U/ML 1.0 1.6     Component      Latest Ref Rng & Units 9/27/2016   Glucose      70 - 99 mg/dL 93   Sodium caused dizziness, and tremore for her   - higher doses of gabapentin didn't help her   - lyrica 100mg bid - dizziness and didnt'h elp    - doxepins at bedtime - caused dizziness.    - tried cyclobenzaprine in tihe past as well - she can't remember -she decl

## 2017-10-09 NOTE — TELEPHONE ENCOUNTER
Cholesterol Medications. PROTOCOL FAILED. PLEASE ADVISE ON REFILL. THANKS.     Protocol Criteria:  · Appointment scheduled in the past 12 months or in the next 3 months  · ALT & LDL on file in the past 12 months  · ALT result < 80  · LDL result <130   Recen

## 2017-10-09 NOTE — PATIENT INSTRUCTIONS
1. Cont. Gabapentin 400mg at night   2. Diclofenac 1 % gel  3. follow up with dr. Gunner Newberry for results of her knee studies  4. Follow up with in 6 months.      [address] [facility]  Phone: [phone]  Fax: [phone]

## 2017-10-10 RX ORDER — PRAVASTATIN SODIUM 20 MG
TABLET ORAL
Qty: 90 TABLET | Refills: 1 | Status: SHIPPED | OUTPATIENT
Start: 2017-10-10 | End: 2018-04-11

## 2017-10-11 ENCOUNTER — APPOINTMENT (OUTPATIENT)
Dept: PHYSICAL THERAPY | Facility: HOSPITAL | Age: 57
End: 2017-10-11
Attending: INTERNAL MEDICINE
Payer: MEDICARE

## 2017-10-20 ENCOUNTER — OFFICE VISIT (OUTPATIENT)
Dept: ORTHOPEDICS CLINIC | Facility: CLINIC | Age: 57
End: 2017-10-20

## 2017-10-20 DIAGNOSIS — S83.242A ACUTE MEDIAL MENISCUS TEAR, LEFT, INITIAL ENCOUNTER: Primary | ICD-10-CM

## 2017-10-20 PROCEDURE — G0463 HOSPITAL OUTPT CLINIC VISIT: HCPCS | Performed by: ORTHOPAEDIC SURGERY

## 2017-10-20 PROCEDURE — 99213 OFFICE O/P EST LOW 20 MIN: CPT | Performed by: ORTHOPAEDIC SURGERY

## 2017-10-20 NOTE — PROGRESS NOTES
10/20/2017  Kanu Hart  68/1960  64year old   female  Meliza Retana MD    HPI:   Patient presents with:  Knee Pain: Pt is here for a follow up on left knee pain. Pain is the same in the left knee. Pain scale 6 to the knee. Pt had MRI.  To discuss meniscus tear that is symptomatic. The patient and I had a long discussion about the risks and benefits of surgical and nonsurgical intervention. Patient at this time would like to schedule surgery in late December or early January 2018.   The patient is

## 2017-10-25 ENCOUNTER — OFFICE VISIT (OUTPATIENT)
Dept: DERMATOLOGY CLINIC | Facility: CLINIC | Age: 57
End: 2017-10-25

## 2017-10-25 DIAGNOSIS — L30.9 DERMATITIS: Primary | ICD-10-CM

## 2017-10-25 DIAGNOSIS — L70.8 OTHER ACNE: ICD-10-CM

## 2017-10-25 PROCEDURE — 99213 OFFICE O/P EST LOW 20 MIN: CPT | Performed by: DERMATOLOGY

## 2017-10-25 PROCEDURE — G0463 HOSPITAL OUTPT CLINIC VISIT: HCPCS | Performed by: DERMATOLOGY

## 2017-10-25 RX ORDER — TRIAMCINOLONE ACETONIDE 5 MG/G
CREAM TOPICAL
Qty: 15 G | Refills: 1 | Status: SHIPPED | OUTPATIENT
Start: 2017-10-25 | End: 2018-01-10 | Stop reason: ALTCHOICE

## 2017-10-30 RX ORDER — DULOXETIN HYDROCHLORIDE 20 MG/1
20 CAPSULE, DELAYED RELEASE ORAL DAILY
Qty: 30 CAPSULE | Refills: 1 | OUTPATIENT
Start: 2017-10-30

## 2017-10-30 NOTE — TELEPHONE ENCOUNTER
ASSESSMENT AND PLAN:   Ayanna Jacques is a 64year old female who presents for Patient presents with:  Osteoarthritis: joint and muscle pain  Fibromyalgia Syndrome        1.fibromyalgia -   - re-establishing care   -  Can check  ck , aldolase - in the futur

## 2017-11-02 ENCOUNTER — TELEPHONE (OUTPATIENT)
Dept: ORTHOPEDICS CLINIC | Facility: CLINIC | Age: 57
End: 2017-11-02

## 2017-11-02 NOTE — TELEPHONE ENCOUNTER
Call to [facility]. Little English  Slowly spoke that she woould be called back. Call to [facility] , Dr. Teddy Curiel surgery scheduler. Informed pt wanted to plan for surgery in January. Informed she was [place]ish speaking.   Requested phone call be made to patient abo

## 2017-11-05 NOTE — PROGRESS NOTES
Amie Rodrigues is a 64year old female. Patient presents with:  Rash: Pt of SD. Here with c/o of allergic rash at jawline with red bumps about 2 weeks ago. Thinks it's from cleaning house & garage, possibly from cleaning agents. Now appears clear.   P Alcohol use: No                            Current Outpatient Prescriptions:  triamcinolone acetonide 0.5 % External Cream Use bid as directed Disp: 15 g Rfl: 1   PRAVASTATIN SODIUM 20 MG Oral Tab TAKE 1 TABLET BY MOUTH NIGHTLY.  Disp: 90 tablet Rfl: 1 Smokeless tobacco: Never Used    Alcohol use No    Drug use: No    Sexual activity: No     Other Topics Concern    Currently spends a great deal of time in the sun No    Past Sunlamp Treatments for Acne No    History of tanning No    Hx of Spending [facility] new or different no unusual exposures. No changes in soaps, detergents, skin care products. No recent travel. No else at home itching. No recent illnesses. ROS:   Denies any other systemic complaints.   No fevers, chills, night sweats, photosensit of melanoma discussed with patient. Sunscreen use, sun protection, self exams reviewed. No orders of the defined types were placed in this encounter. Meds & Refills for this Visit:   Signed Prescriptions Disp Refills    triamcinolone acetonide 0.

## 2017-11-07 ENCOUNTER — OFFICE VISIT (OUTPATIENT)
Dept: SURGERY | Facility: CLINIC | Age: 57
End: 2017-11-07

## 2017-11-07 VITALS
HEART RATE: 59 BPM | WEIGHT: 181.44 LBS | SYSTOLIC BLOOD PRESSURE: 118 MMHG | RESPIRATION RATE: 16 BRPM | OXYGEN SATURATION: 97 % | DIASTOLIC BLOOD PRESSURE: 68 MMHG | BODY MASS INDEX: 33.39 KG/M2 | HEIGHT: 62 IN

## 2017-11-07 DIAGNOSIS — E66.9 OBESITY (BMI 30-39.9): ICD-10-CM

## 2017-11-07 DIAGNOSIS — I10 ESSENTIAL HYPERTENSION: Primary | ICD-10-CM

## 2017-11-07 DIAGNOSIS — G47.33 OSA (OBSTRUCTIVE SLEEP APNEA): ICD-10-CM

## 2017-11-07 DIAGNOSIS — F32.2 SEVERE SINGLE CURRENT EPISODE OF MAJOR DEPRESSIVE DISORDER, WITHOUT PSYCHOTIC FEATURES (HCC): ICD-10-CM

## 2017-11-07 PROCEDURE — 99213 OFFICE O/P EST LOW 20 MIN: CPT | Performed by: INTERNAL MEDICINE

## 2017-11-08 NOTE — PROGRESS NOTES
[address], [address] [facility] [address]  Dept: [phone]     Date:   17    Patient:  Sari Ahumada  :      1960  MRN:      [id number]    Chief Complaint:  Harrison Feng Transdermal Gel Apply 4 g topically 4 (four) times daily. Disp: 1 Tube Rfl: 0   gabapentin 400 MG Oral Cap Take one PO Q HS. (Patient taking differently: 300 mg twice a week.  Take one PO Q HS. ) Disp: 90 capsule Rfl: 3   Vitamin B-12 (VITAMIN B12) 1000 MCG Glaucoma Neg        Food Journal  · Reviewed and Discussed:       · Patient has a Food Journal?: yes   · Patient is reading nutrition labels? yes  · Average Caloric Intake:     · Average CHO Intake: 100  · Is patient exercising? yes  · Type of exercise?  w atraumatic, no cyanosis or edema  Pulses: 2+ and symmetric  Skin: Skin color, texture, turgor normal. No rashes or lesions    ASSESSMENT     HYPERTENSION:  The patient's blood pressure has been well controlled.   she has been checking it as instructed and h Patient on cutting back on carbohydrates- advised Patient to limit rice/tortillas/breads/potatoes/bananas and to keep carbs at or below 100g/day    Patient has a meniscus repair scheduled for 1/3/17    Patient speaks Yi only.   Patient's daughter in ro

## 2017-11-22 RX ORDER — METOPROLOL SUCCINATE 25 MG/1
TABLET, EXTENDED RELEASE ORAL
Qty: 30 TABLET | Refills: 2 | OUTPATIENT
Start: 2017-11-22

## 2017-11-22 NOTE — TELEPHONE ENCOUNTER
Noted, pharmacy notified.   Future Appointments  Date Time Provider Dawn Juana   11/29/2017 11:15 AM Miguel Gil MD [address]   1/3/2018 7:30 AM Hermes Dempsey MD [facility]   1/30/2018 10:00 AM Anne-Marie Gant MD [address] [place] [address]

## 2017-11-22 NOTE — TELEPHONE ENCOUNTER
Hypertensive Medications. Please advise on refill. Thanks. CSS, please call pt and schedule OV to est with new MD. Thanks.     Protocol Criteria:  · Appointment scheduled in the past 6 months or in the next 3 months  · BMP or CMP in the past 12 months  ·

## 2017-11-28 NOTE — TELEPHONE ENCOUNTER
[id number] states that she has an appt with Dr. Mila Santiago tomorrow and she will ask him for the refill.

## 2017-11-29 ENCOUNTER — OFFICE VISIT (OUTPATIENT)
Dept: FAMILY MEDICINE CLINIC | Facility: CLINIC | Age: 57
End: 2017-11-29

## 2017-11-29 VITALS
HEART RATE: 58 BPM | HEIGHT: 60.5 IN | WEIGHT: 176 LBS | BODY MASS INDEX: 33.66 KG/M2 | OXYGEN SATURATION: 98 % | SYSTOLIC BLOOD PRESSURE: 110 MMHG | DIASTOLIC BLOOD PRESSURE: 70 MMHG

## 2017-11-29 DIAGNOSIS — H53.8 BLURRY VISION, BILATERAL: ICD-10-CM

## 2017-11-29 DIAGNOSIS — I10 ESSENTIAL HYPERTENSION: Primary | ICD-10-CM

## 2017-11-29 DIAGNOSIS — Z01.89 ENCOUNTER FOR ROUTINE LABORATORY TESTING: ICD-10-CM

## 2017-11-29 DIAGNOSIS — E78.00 HYPERCHOLESTEREMIA: ICD-10-CM

## 2017-11-29 PROCEDURE — 99202 OFFICE O/P NEW SF 15 MIN: CPT

## 2017-11-29 RX ORDER — METOPROLOL SUCCINATE 25 MG/1
12.5 TABLET, EXTENDED RELEASE ORAL DAILY
Qty: 15 TABLET | Refills: 0 | Status: SHIPPED | OUTPATIENT
Start: 2017-11-29 | End: 2018-01-08

## 2017-11-30 NOTE — PATIENT INSTRUCTIONS
Factores de riesgo para la presión arterial mayelin  Los factores de riesgo lo hacen más susceptible a desarrollar aaliyah enfermedad o afección. ¿Sabe cuáles son eriberto factores de riesgo para la presión arterial mayelin?  Aunque usted no puede hacer nada acerca de c

## 2017-11-30 NOTE — PROGRESS NOTES
HPI:    Patient ID: Lexi Flores is a 62year old female. Hypertension   This is a chronic problem. The current episode started more than 1 year ago. The problem has been waxing and waning since onset. The problem is controlled.  Associated symptoms inc MG Oral Tab EC Take  by mouth. take 1 tablet (81MG)  by oral route  every day Disp:  Rfl:      Allergies:No Known Allergies   PHYSICAL EXAM:   Physical Exam   Constitutional: She is oriented to person, place, and time.  She appears well-developed and well-n

## 2017-12-04 ENCOUNTER — TELEPHONE (OUTPATIENT)
Dept: FAMILY MEDICINE CLINIC | Facility: CLINIC | Age: 57
End: 2017-12-04

## 2017-12-04 RX ORDER — PANTOPRAZOLE SODIUM 40 MG/1
TABLET, DELAYED RELEASE ORAL
Qty: 90 TABLET | Refills: 0 | OUTPATIENT
Start: 2017-12-04

## 2017-12-04 RX ORDER — PANTOPRAZOLE SODIUM 40 MG/1
40 TABLET, DELAYED RELEASE ORAL
Qty: 30 TABLET | Refills: 0 | Status: SHIPPED | OUTPATIENT
Start: 2017-12-04 | End: 2018-01-03

## 2017-12-04 NOTE — TELEPHONE ENCOUNTER
Sent 1 month supply of pantoprazole to preferred pharmacy. One month only - pt due for physical within the next month.

## 2017-12-07 ENCOUNTER — TELEPHONE (OUTPATIENT)
Dept: ORTHOPEDICS CLINIC | Facility: CLINIC | Age: 57
End: 2017-12-07

## 2017-12-07 NOTE — TELEPHONE ENCOUNTER
Review of coverages  Surgery by Dr. Bryant Ascencio  January 3, 2018  Left knee scope  [facility]  CPT 72491  Left knee scope PMM  Diagnosis code S83.242A    Out patient  Medicare primary  Medicaid secondary    No prior auth with medicare.

## 2017-12-22 ENCOUNTER — APPOINTMENT (OUTPATIENT)
Dept: LAB | Facility: HOSPITAL | Age: 57
End: 2017-12-22
Payer: MEDICARE

## 2017-12-22 DIAGNOSIS — Z01.89 ENCOUNTER FOR ROUTINE LABORATORY TESTING: ICD-10-CM

## 2017-12-22 DIAGNOSIS — E78.00 HYPERCHOLESTEREMIA: ICD-10-CM

## 2017-12-22 PROCEDURE — 85027 COMPLETE CBC AUTOMATED: CPT

## 2017-12-22 PROCEDURE — 36415 COLL VENOUS BLD VENIPUNCTURE: CPT

## 2017-12-22 PROCEDURE — 80061 LIPID PANEL: CPT

## 2017-12-22 PROCEDURE — 80053 COMPREHEN METABOLIC PANEL: CPT

## 2017-12-22 PROCEDURE — 81001 URINALYSIS AUTO W/SCOPE: CPT

## 2017-12-23 ENCOUNTER — HOSPITAL ENCOUNTER (OUTPATIENT)
Age: 57
Discharge: HOME | End: 2017-12-23
Attending: FAMILY MEDICINE
Payer: MEDICARE

## 2017-12-23 VITALS
OXYGEN SATURATION: 97 % | DIASTOLIC BLOOD PRESSURE: 66 MMHG | TEMPERATURE: 97 F | BODY MASS INDEX: 34 KG/M2 | WEIGHT: 175 LBS | SYSTOLIC BLOOD PRESSURE: 154 MMHG | HEART RATE: 58 BPM | RESPIRATION RATE: 16 BRPM

## 2017-12-23 DIAGNOSIS — J02.9 VIRAL PHARYNGITIS: Primary | ICD-10-CM

## 2017-12-23 PROCEDURE — 87430 STREP A AG IA: CPT

## 2017-12-23 PROCEDURE — 99213 OFFICE O/P EST LOW 20 MIN: CPT

## 2017-12-23 PROCEDURE — 99212 OFFICE O/P EST SF 10 MIN: CPT

## 2017-12-23 RX ORDER — AMOXICILLIN 875 MG/1
875 TABLET, COATED ORAL 2 TIMES DAILY
Qty: 20 TABLET | Refills: 0 | Status: SHIPPED | OUTPATIENT
Start: 2017-12-23 | End: 2018-01-02

## 2017-12-23 RX ORDER — BENZONATATE 100 MG/1
100 CAPSULE ORAL 3 TIMES DAILY PRN
Qty: 20 CAPSULE | Refills: 0 | Status: SHIPPED | OUTPATIENT
Start: 2017-12-23 | End: 2018-01-10 | Stop reason: ALTCHOICE

## 2017-12-23 NOTE — ED PROVIDER NOTES
Patient Seen in: [facility] [place]    History   CC:  Patient presents with:  Sore Throat    Stated Complaint: sore throat    ------------------------------  Per Rn:   Patient complains of sore throat and cough  ---------------- conjunctiva/corneas clear, EOM's intact   Ears:    Normal TM's and external ear canals, both ears   Nose:   Nares normal, septum midline, mucosa normal, no drainage    or sinus tenderness   Throat:   mild hyperemia, NO exudate;    Neck:   Supple, symmetric

## 2017-12-26 DIAGNOSIS — I10 ESSENTIAL HYPERTENSION: ICD-10-CM

## 2017-12-27 RX ORDER — METOPROLOL SUCCINATE 25 MG/1
12.5 TABLET, EXTENDED RELEASE ORAL DAILY
Qty: 15 TABLET | Refills: 0 | OUTPATIENT
Start: 2017-12-27

## 2017-12-29 ENCOUNTER — TELEPHONE (OUTPATIENT)
Dept: ORTHOPEDICS CLINIC | Facility: CLINIC | Age: 57
End: 2017-12-29

## 2017-12-29 ENCOUNTER — OFFICE VISIT (OUTPATIENT)
Dept: ORTHOPEDICS CLINIC | Facility: CLINIC | Age: 57
End: 2017-12-29

## 2017-12-29 DIAGNOSIS — S83.242A ACUTE MEDIAL MENISCUS TEAR OF LEFT KNEE, INITIAL ENCOUNTER: Primary | ICD-10-CM

## 2017-12-29 PROCEDURE — G0463 HOSPITAL OUTPT CLINIC VISIT: HCPCS | Performed by: ORTHOPAEDIC SURGERY

## 2017-12-29 PROCEDURE — 99213 OFFICE O/P EST LOW 20 MIN: CPT | Performed by: ORTHOPAEDIC SURGERY

## 2017-12-29 NOTE — H&P
12/29/2017  Christine Barrios  68/1960  62year old   female  Jessica Palma MD    HPI:   Patient presents with:  Pre-Op Exam: Left knee - here with her daughter-in-law who translates for her - has sx scheduled for 1/3/18 - states she still has pain rat • Fibromyalgia affecting multiple sites    • Meibomian gland dysfunction 07/12/14    Schirmer 28mm/26mm   • Obesity (BMI 30-39. 9)    • Other and unspecified hyperlipidemia    • Unspecified essential hypertension       Past Surgical History:  2006: CARPAL line tenderness to palpation. Patient has pain with hyperflexion over the medial joint line. Patient has positive Samantha's test over medial joint line.   Patient is intact cruciate and collateral ligament exam.  Patient has soft calf and negative Homans

## 2018-01-08 DIAGNOSIS — I10 ESSENTIAL HYPERTENSION: ICD-10-CM

## 2018-01-08 RX ORDER — METOPROLOL SUCCINATE 25 MG/1
12.5 TABLET, EXTENDED RELEASE ORAL DAILY
Qty: 8 TABLET | Refills: 0 | Status: SHIPPED
Start: 2018-01-08 | End: 2018-01-10

## 2018-01-08 NOTE — TELEPHONE ENCOUNTER
A 15 day supply was authorize to her pharmacy, she is to be seen sometime before she runs out of her medication.

## 2018-01-08 NOTE — PROGRESS NOTES
LOWER EXTREMITY EVALUATION:   Referring Physician: Dr. Trev Astudillo  Diagnosis: Primary osteoarthritis involving multiple joints (M15.0)      Date of Onset: March 2017 Date of Service: 6/15/2017     PATIENT SUMMARY   The patient is a primarily Moroccan-speaki stability, and balance to be able to perform household chores, negotiate stairs and perform grocery shopping without limitation      Precautions:  None     OBJECTIVE:   Gait: Loss of heel strike and push off (mid foot strike) with knee hyperextension (L>R) will be seen for 1-2x/wk for 4-5 wks . Treatment will include: Manual Therapy; Therapeutic Exercises; Neuromuscular Re-education;  Therapeutic Activity; Gait Training; Electrical Stim; Patient education; Home exercise program instruction    Education or dorota Scalpel Size: 15 blade

## 2018-01-08 NOTE — TELEPHONE ENCOUNTER
From: Amie Rodrigues  Sent: 1/8/2018 8:35 AM CST  Subject: Medication Renewal Request    Amie Rodrigues would like a refill of the following medications:     Metoprolol Succinate ER 25 MG Oral Tablet 24 Hr [VIKTOR LYNN MD]   Patient Comment: Geoff Blue ref

## 2018-01-09 RX ORDER — PAROXETINE 10 MG/1
TABLET, FILM COATED ORAL
Qty: 30 TABLET | Refills: 2 | OUTPATIENT
Start: 2018-01-09

## 2018-01-10 ENCOUNTER — OFFICE VISIT (OUTPATIENT)
Dept: FAMILY MEDICINE CLINIC | Facility: CLINIC | Age: 58
End: 2018-01-10

## 2018-01-10 VITALS
BODY MASS INDEX: 33.85 KG/M2 | HEART RATE: 60 BPM | OXYGEN SATURATION: 98 % | HEIGHT: 60.5 IN | SYSTOLIC BLOOD PRESSURE: 106 MMHG | WEIGHT: 177 LBS | DIASTOLIC BLOOD PRESSURE: 70 MMHG | TEMPERATURE: 98 F

## 2018-01-10 DIAGNOSIS — I10 ESSENTIAL HYPERTENSION: ICD-10-CM

## 2018-01-10 DIAGNOSIS — M79.7 FIBROMYALGIA: ICD-10-CM

## 2018-01-10 DIAGNOSIS — H02.889 MGD (MEIBOMIAN GLAND DYSFUNCTION): ICD-10-CM

## 2018-01-10 DIAGNOSIS — S83.232A COMPLEX TEAR OF MEDIAL MENISCUS OF LEFT KNEE AS CURRENT INJURY, INITIAL ENCOUNTER: ICD-10-CM

## 2018-01-10 DIAGNOSIS — G47.33 OSA (OBSTRUCTIVE SLEEP APNEA): ICD-10-CM

## 2018-01-10 DIAGNOSIS — M15.9 PRIMARY OSTEOARTHRITIS INVOLVING MULTIPLE JOINTS: ICD-10-CM

## 2018-01-10 DIAGNOSIS — Z01.818 ENCOUNTER FOR PRE-OPERATIVE EXAMINATION: Primary | ICD-10-CM

## 2018-01-10 DIAGNOSIS — K21.00 GASTROESOPHAGEAL REFLUX DISEASE WITH ESOPHAGITIS: ICD-10-CM

## 2018-01-10 DIAGNOSIS — E66.09 NON MORBID OBESITY DUE TO EXCESS CALORIES: ICD-10-CM

## 2018-01-10 DIAGNOSIS — H25.13 AGE-RELATED NUCLEAR CATARACT OF BOTH EYES: ICD-10-CM

## 2018-01-10 DIAGNOSIS — F32.4 MAJOR DEPRESSIVE DISORDER WITH SINGLE EPISODE, IN PARTIAL REMISSION (HCC): ICD-10-CM

## 2018-01-10 DIAGNOSIS — E78.00 HYPERCHOLESTEREMIA: ICD-10-CM

## 2018-01-10 PROCEDURE — 99214 OFFICE O/P EST MOD 30 MIN: CPT

## 2018-01-10 RX ORDER — METOPROLOL SUCCINATE 25 MG/1
12.5 TABLET, EXTENDED RELEASE ORAL DAILY
Qty: 45 TABLET | Refills: 0 | Status: SHIPPED | OUTPATIENT
Start: 2018-01-10 | End: 2018-04-11

## 2018-01-10 RX ORDER — GABAPENTIN 300 MG/1
300 CAPSULE ORAL NIGHTLY
COMMUNITY
End: 2018-04-09

## 2018-01-10 RX ORDER — PANTOPRAZOLE SODIUM 40 MG/1
40 TABLET, DELAYED RELEASE ORAL
Qty: 30 TABLET | Refills: 0 | OUTPATIENT
Start: 2018-01-10 | End: 2018-02-09

## 2018-01-10 RX ORDER — PANTOPRAZOLE SODIUM 40 MG/1
40 TABLET, DELAYED RELEASE ORAL
Qty: 90 TABLET | Refills: 0 | Status: SHIPPED | OUTPATIENT
Start: 2018-01-10 | End: 2018-04-11

## 2018-01-10 NOTE — PROGRESS NOTES
[facility] Medicine Office Pre-OP Note    HPI:   Christine Barrios is a 62year old female with a hx of a complex tear of the left medial meniscus, who presents for a pre-operative physical exam. Patient is to have left knee arthros • No Known Problems Daughter    • No Known Problems Son    • No Known Problems Brother    • No Known Problems Brother    • No Known Problems Brother    • No Known Problems Sister        Social History  Social History   Marital status:   Spouse nam bruising. LYMPHATICS:  Denies enlarged nodes or history of splenectomy. PSYCHIATRIC:  Denies anxiety, + depression. ENDOCRINOLOGIC:  Denies excessive sweating, cold or heat intolerance, polyuria or polydipsia.   ALLERGIES:  Denies allergic response, hist meniscus, who presents for a pre-operative physical exam. Patient is to have left knee arthroscopy and partial medial meniscectomy, to be done by Dr. Ritu Downey (Orthopedics) at [address] [place] [address]. 1. Encounter for pre-operative examination  2.  Complex tear of medial me to diet and exercise. Pt is medically stable to undergo surgery as planned.   RTC in 3 months for f/u of HTN and annual physical exam.      Yasir Lowery MD  1/10/2018  2:07 PM

## 2018-01-11 PROBLEM — S83.232A COMPLEX TEAR OF MEDIAL MENISCUS OF LEFT KNEE AS CURRENT INJURY: Status: ACTIVE | Noted: 2018-01-11

## 2018-01-11 PROBLEM — Z01.818 ENCOUNTER FOR PRE-OPERATIVE EXAMINATION: Status: ACTIVE | Noted: 2018-01-11

## 2018-01-11 NOTE — PATIENT INSTRUCTIONS
¿Qué es un desgarro de menisco?    El menisco es aaliyah almohadilla resistente de tejido fibroso llamado cartílago que está en la articulación de la rodilla. Protege la rodilla.  Absorbe los impactos y [place] a repartir el peso en toda la articulación de la r · Medicamentos. Los medicamentos analgésicos de venta con y sin receta pueden ayudar a aliviarle el dolor y la inflamación. · Ejercicios. Los ejercicios ayudan a fortalecer los músculos de la pierna para brindarle soporte a la articulación de la rodilla.

## 2018-01-18 ENCOUNTER — TELEPHONE (OUTPATIENT)
Dept: ORTHOPEDICS CLINIC | Facility: CLINIC | Age: 58
End: 2018-01-18

## 2018-01-18 NOTE — TELEPHONE ENCOUNTER
Review of coverages  Surgery with Dr. Chuy Aragon  1-24-18  Left knee scope/PMM [address]53  Diagnosis code E87.959E  [facility]    Medicare primary  Medicaid secondary  No prior auth required.

## 2018-01-19 RX ORDER — METOCLOPRAMIDE 10 MG/1
10 TABLET ORAL ONCE
Status: CANCELLED | OUTPATIENT
Start: 2018-01-19 | End: 2018-01-19

## 2018-01-22 ENCOUNTER — TELEPHONE (OUTPATIENT)
Dept: FAMILY MEDICINE CLINIC | Facility: CLINIC | Age: 58
End: 2018-01-22

## 2018-01-22 NOTE — TELEPHONE ENCOUNTER
Per Dr Neeraj Ocampo, patient is suppose to be stop it a week before her surgery. I called patient to notify her about Dr Karen Lorenzo instructions and she said she forgot them, but will stop the aspirin as of today.

## 2018-01-22 NOTE — TELEPHONE ENCOUNTER
Pt called is having knee surgery this week on Wednesday.  Pt was told by her surgeon to call the office to ask if the pt should take discontinue the aspirin for surgery

## 2018-01-24 ENCOUNTER — HOSPITAL ENCOUNTER (OUTPATIENT)
Facility: HOSPITAL | Age: 58
Setting detail: HOSPITAL OUTPATIENT SURGERY
Discharge: HOME | End: 2018-01-24
Attending: ORTHOPAEDIC SURGERY | Admitting: ORTHOPAEDIC SURGERY
Payer: MEDICARE

## 2018-01-24 ENCOUNTER — ANESTHESIA (OUTPATIENT)
Dept: SURGERY | Facility: HOSPITAL | Age: 58
End: 2018-01-24
Payer: MEDICARE

## 2018-01-24 ENCOUNTER — ANESTHESIA EVENT (OUTPATIENT)
Dept: SURGERY | Facility: HOSPITAL | Age: 58
End: 2018-01-24
Payer: MEDICARE

## 2018-01-24 ENCOUNTER — SURGERY (OUTPATIENT)
Age: 58
End: 2018-01-24

## 2018-01-24 VITALS
HEIGHT: 63 IN | HEART RATE: 60 BPM | WEIGHT: 179.31 LBS | TEMPERATURE: 98 F | BODY MASS INDEX: 31.77 KG/M2 | OXYGEN SATURATION: 98 % | SYSTOLIC BLOOD PRESSURE: 154 MMHG | RESPIRATION RATE: 16 BRPM | DIASTOLIC BLOOD PRESSURE: 63 MMHG

## 2018-01-24 DIAGNOSIS — S83.242A ACUTE MEDIAL MENISCUS TEAR OF LEFT KNEE, INITIAL ENCOUNTER: ICD-10-CM

## 2018-01-24 DIAGNOSIS — S83.289A LATERAL MENISCUS TEAR: Primary | ICD-10-CM

## 2018-01-24 PROCEDURE — 0SBD4ZZ EXCISION OF LEFT KNEE JOINT, PERCUTANEOUS ENDOSCOPIC APPROACH: ICD-10-PCS | Performed by: ORTHOPAEDIC SURGERY

## 2018-01-24 PROCEDURE — 94010 BREATHING CAPACITY TEST: CPT | Performed by: ORTHOPAEDIC SURGERY

## 2018-01-24 RX ORDER — NALOXONE HYDROCHLORIDE 0.4 MG/ML
80 INJECTION, SOLUTION INTRAMUSCULAR; INTRAVENOUS; SUBCUTANEOUS AS NEEDED
Status: DISCONTINUED | OUTPATIENT
Start: 2018-01-24 | End: 2018-01-24

## 2018-01-24 RX ORDER — MORPHINE SULFATE 4 MG/ML
4 INJECTION, SOLUTION INTRAMUSCULAR; INTRAVENOUS EVERY 10 MIN PRN
Status: DISCONTINUED | OUTPATIENT
Start: 2018-01-24 | End: 2018-01-24

## 2018-01-24 RX ORDER — SODIUM CHLORIDE, SODIUM LACTATE, POTASSIUM CHLORIDE, CALCIUM CHLORIDE 600; 310; 30; 20 MG/100ML; MG/100ML; MG/100ML; MG/100ML
INJECTION, SOLUTION INTRAVENOUS CONTINUOUS
Status: DISCONTINUED | OUTPATIENT
Start: 2018-01-24 | End: 2018-01-24

## 2018-01-24 RX ORDER — MIDAZOLAM HYDROCHLORIDE 1 MG/ML
INJECTION INTRAMUSCULAR; INTRAVENOUS AS NEEDED
Status: DISCONTINUED | OUTPATIENT
Start: 2018-01-24 | End: 2018-01-24 | Stop reason: SURG

## 2018-01-24 RX ORDER — CEFAZOLIN SODIUM/WATER 2 G/20 ML
2 SYRINGE (ML) INTRAVENOUS ONCE
Status: COMPLETED | OUTPATIENT
Start: 2018-01-24 | End: 2018-01-24

## 2018-01-24 RX ORDER — LIDOCAINE HYDROCHLORIDE 10 MG/ML
INJECTION, SOLUTION EPIDURAL; INFILTRATION; INTRACAUDAL; PERINEURAL AS NEEDED
Status: DISCONTINUED | OUTPATIENT
Start: 2018-01-24 | End: 2018-01-24 | Stop reason: SURG

## 2018-01-24 RX ORDER — HYDROCODONE BITARTRATE AND ACETAMINOPHEN 5; 325 MG/1; MG/1
1 TABLET ORAL AS NEEDED
Status: DISCONTINUED | OUTPATIENT
Start: 2018-01-24 | End: 2018-01-24

## 2018-01-24 RX ORDER — HYDROMORPHONE HYDROCHLORIDE 1 MG/ML
0.4 INJECTION, SOLUTION INTRAMUSCULAR; INTRAVENOUS; SUBCUTANEOUS EVERY 5 MIN PRN
Status: DISCONTINUED | OUTPATIENT
Start: 2018-01-24 | End: 2018-01-24

## 2018-01-24 RX ORDER — FAMOTIDINE 20 MG/1
20 TABLET ORAL ONCE
Status: DISCONTINUED | OUTPATIENT
Start: 2018-01-24 | End: 2018-01-24 | Stop reason: HOSPADM

## 2018-01-24 RX ORDER — ONDANSETRON 2 MG/ML
INJECTION INTRAMUSCULAR; INTRAVENOUS AS NEEDED
Status: DISCONTINUED | OUTPATIENT
Start: 2018-01-24 | End: 2018-01-24 | Stop reason: SURG

## 2018-01-24 RX ORDER — METOPROLOL TARTRATE 5 MG/5ML
2.5 INJECTION INTRAVENOUS ONCE
Status: DISCONTINUED | OUTPATIENT
Start: 2018-01-24 | End: 2018-01-24

## 2018-01-24 RX ORDER — DEXAMETHASONE SODIUM PHOSPHATE 4 MG/ML
VIAL (ML) INJECTION AS NEEDED
Status: DISCONTINUED | OUTPATIENT
Start: 2018-01-24 | End: 2018-01-24 | Stop reason: SURG

## 2018-01-24 RX ORDER — HYDROCODONE BITARTRATE AND ACETAMINOPHEN 5; 325 MG/1; MG/1
2 TABLET ORAL AS NEEDED
Status: DISCONTINUED | OUTPATIENT
Start: 2018-01-24 | End: 2018-01-24

## 2018-01-24 RX ORDER — HYDROCODONE BITARTRATE AND ACETAMINOPHEN 5; 325 MG/1; MG/1
1-2 TABLET ORAL EVERY 6 HOURS PRN
Qty: 40 TABLET | Refills: 0 | Status: SHIPPED | OUTPATIENT
Start: 2018-01-24 | End: 2018-02-03

## 2018-01-24 RX ORDER — MORPHINE SULFATE 10 MG/ML
6 INJECTION, SOLUTION INTRAMUSCULAR; INTRAVENOUS EVERY 10 MIN PRN
Status: DISCONTINUED | OUTPATIENT
Start: 2018-01-24 | End: 2018-01-24

## 2018-01-24 RX ORDER — HYDROMORPHONE HYDROCHLORIDE 1 MG/ML
0.2 INJECTION, SOLUTION INTRAMUSCULAR; INTRAVENOUS; SUBCUTANEOUS EVERY 5 MIN PRN
Status: DISCONTINUED | OUTPATIENT
Start: 2018-01-24 | End: 2018-01-24

## 2018-01-24 RX ORDER — ONDANSETRON 2 MG/ML
4 INJECTION INTRAMUSCULAR; INTRAVENOUS ONCE AS NEEDED
Status: DISCONTINUED | OUTPATIENT
Start: 2018-01-24 | End: 2018-01-24

## 2018-01-24 RX ORDER — HYDROMORPHONE HYDROCHLORIDE 1 MG/ML
0.6 INJECTION, SOLUTION INTRAMUSCULAR; INTRAVENOUS; SUBCUTANEOUS EVERY 5 MIN PRN
Status: DISCONTINUED | OUTPATIENT
Start: 2018-01-24 | End: 2018-01-24

## 2018-01-24 RX ORDER — ACETAMINOPHEN 500 MG
1000 TABLET ORAL ONCE
Status: COMPLETED | OUTPATIENT
Start: 2018-01-24 | End: 2018-01-24

## 2018-01-24 RX ORDER — ONDANSETRON 2 MG/ML
4 INJECTION INTRAMUSCULAR; INTRAVENOUS EVERY 6 HOURS PRN
Status: DISCONTINUED | OUTPATIENT
Start: 2018-01-24 | End: 2018-01-24

## 2018-01-24 RX ORDER — MORPHINE SULFATE 2 MG/ML
2 INJECTION, SOLUTION INTRAMUSCULAR; INTRAVENOUS EVERY 10 MIN PRN
Status: DISCONTINUED | OUTPATIENT
Start: 2018-01-24 | End: 2018-01-24

## 2018-01-24 RX ORDER — BUPIVACAINE HYDROCHLORIDE AND EPINEPHRINE 5; 5 MG/ML; UG/ML
INJECTION, SOLUTION PERINEURAL AS NEEDED
Status: DISCONTINUED | OUTPATIENT
Start: 2018-01-24 | End: 2018-01-24 | Stop reason: HOSPADM

## 2018-01-24 RX ADMIN — DEXAMETHASONE SODIUM PHOSPHATE 4 MG: 4 MG/ML VIAL (ML) INJECTION at 12:38:00

## 2018-01-24 RX ADMIN — ONDANSETRON 4 MG: 2 INJECTION INTRAMUSCULAR; INTRAVENOUS at 12:38:00

## 2018-01-24 RX ADMIN — SODIUM CHLORIDE, SODIUM LACTATE, POTASSIUM CHLORIDE, CALCIUM CHLORIDE: 600; 310; 30; 20 INJECTION, SOLUTION INTRAVENOUS at 12:23:00

## 2018-01-24 RX ADMIN — SODIUM CHLORIDE, SODIUM LACTATE, POTASSIUM CHLORIDE, CALCIUM CHLORIDE: 600; 310; 30; 20 INJECTION, SOLUTION INTRAVENOUS at 13:00:00

## 2018-01-24 RX ADMIN — MIDAZOLAM HYDROCHLORIDE 2 MG: 1 INJECTION INTRAMUSCULAR; INTRAVENOUS at 12:25:00

## 2018-01-24 RX ADMIN — LIDOCAINE HYDROCHLORIDE 50 MG: 10 INJECTION, SOLUTION EPIDURAL; INFILTRATION; INTRACAUDAL; PERINEURAL at 12:28:00

## 2018-01-24 RX ADMIN — CEFAZOLIN SODIUM/WATER 2 G: 2 G/20 ML SYRINGE (ML) INTRAVENOUS at 12:35:00

## 2018-01-24 NOTE — OPERATIVE REPORT
[facility]    PATIENT'S NAME: Korin Ml   ATTENDING PHYSICIAN: Amber Snow MD   OPERATING PHYSICIAN: Amber Snow MD   PATIENT ACCOUNT#:   [id number]    LOCATION:  [facility]  MEDICAL RECORD #:   [id number]       DATE OF There were no loose bodies in the medial or lateral gutters. The medial compartment was entered, and there was a tear of the posterior horn of the medial meniscus. There was no significant articular cartilage damage in the medial compartment.   This menis

## 2018-01-24 NOTE — BRIEF OP NOTE
Pre-Operative Diagnosis: Acute medial meniscus tear of left knee, initial encounter [S83.242A]     Post-Operative Diagnosis: Acute medial meniscus tear of left knee, initial encounter [S83.242A]  Acute lateral meniscus tear of the left knee     Procedur

## 2018-01-24 NOTE — ANESTHESIA PREPROCEDURE EVALUATION
Anesthesia PreOp Note    HPI:     Raine Arana is a 62year old female who presents for preoperative consultation requested by: Rolando Damian MD    Date of Surgery: 1/24/2018    Procedure(s):  KNEE ARTHROSCOPY  Indication: Acute medial meniscus tea Past Surgical History:  2006: CARPAL TUNNEL RELEASE Right  10-14-14: CARPAL TUNNEL RELEASE Left      Comment: Endoscopic  04-: ELECTROCARDIOGRAM, COMPLETE      Comment: scanned to media tab      Prescriptions Prior to Admission:  Metoprolol Bernstein No Known Problems Brother    • No Known Problems Brother    • No Known Problems Brother    • No Known Problems Sister        Social History  Social History   Marital status:   Spouse name: N/A    Years of education: N/A  Number of children: N/A Physical Exam      Airway   Mallampati: II  Dental    (+) upper dentures, lower dentures and partials    Pulmonary     breath sounds clear to auscultation  (+) sleep apnea,   Cardiovascular   (+) hypertension,     ECG reviewed  Rhythm: regular  Rate: sheyla

## 2018-01-24 NOTE — INTERVAL H&P NOTE
Pre-op Diagnosis: Acute medial meniscus tear of left knee, initial encounter [S83.242A]    The above referenced H&P was reviewed by Teddy Layne MD on 1/24/2018, the patient was examined and no significant changes have occurred in the patient's cond

## 2018-01-24 NOTE — ANESTHESIA POSTPROCEDURE EVALUATION
Patient: Ayanna Jacques    Procedure Summary     Date:  01/24/18 Room / Location:  [address] [place] [address] MAIN OR 08 / [facility] MAIN OR    Anesthesia Start:  8009 Anesthesia Stop:      Procedure:  KNEE ARTHROSCOPY (Left ) Diagnosis:       Acute medial meniscus tear of left knee, i

## 2018-01-24 NOTE — H&P (VIEW-ONLY)
12/29/2017  Nivia Postin  68/1960  62year old   female  Brooke Juarez MD    HPI:   Patient presents with:  Pre-Op Exam: Left knee - here with her daughter-in-law who translates for her - has sx scheduled for 1/3/18 - states she still has pain rat • Fibromyalgia affecting multiple sites    • Meibomian gland dysfunction 07/12/14    Schirmer 28mm/26mm   • Obesity (BMI 30-39. 9)    • Other and unspecified hyperlipidemia    • Unspecified essential hypertension       Past Surgical History:  2006: CARPAL line tenderness to palpation. Patient has pain with hyperflexion over the medial joint line. Patient has positive Samantha's test over medial joint line.   Patient is intact cruciate and collateral ligament exam.  Patient has soft calf and negative Homans

## 2018-01-26 ENCOUNTER — OFFICE VISIT (OUTPATIENT)
Dept: ORTHOPEDICS CLINIC | Facility: CLINIC | Age: 58
End: 2018-01-26

## 2018-01-26 DIAGNOSIS — S83.242A ACUTE MEDIAL MENISCUS TEAR OF LEFT KNEE, INITIAL ENCOUNTER: Primary | ICD-10-CM

## 2018-01-26 DIAGNOSIS — S83.282A ACUTE LATERAL MENISCUS TEAR OF LEFT KNEE, INITIAL ENCOUNTER: ICD-10-CM

## 2018-01-26 PROCEDURE — 99024 POSTOP FOLLOW-UP VISIT: CPT | Performed by: ORTHOPAEDIC SURGERY

## 2018-01-26 PROCEDURE — G0463 HOSPITAL OUTPT CLINIC VISIT: HCPCS | Performed by: ORTHOPAEDIC SURGERY

## 2018-01-26 NOTE — PROGRESS NOTES
This is a pleasant 59-year-old female that is 2 days status post left knee arthroscopy and partial medial lateral meniscectomy. Patient has had no fevers, chills, chest pain, shortness of breath.   The patient comes in today with her daughter for first pos

## 2018-01-28 DIAGNOSIS — I10 ESSENTIAL HYPERTENSION: ICD-10-CM

## 2018-01-28 RX ORDER — METOPROLOL SUCCINATE 25 MG/1
12.5 TABLET, EXTENDED RELEASE ORAL DAILY
Qty: 45 TABLET | Refills: 0 | Status: CANCELLED
Start: 2018-01-28

## 2018-02-06 ENCOUNTER — OFFICE VISIT (OUTPATIENT)
Dept: PHYSICAL THERAPY | Facility: HOSPITAL | Age: 58
End: 2018-02-06
Attending: ORTHOPAEDIC SURGERY
Payer: MEDICARE

## 2018-02-06 DIAGNOSIS — S83.282A ACUTE LATERAL MENISCUS TEAR OF LEFT KNEE, INITIAL ENCOUNTER: ICD-10-CM

## 2018-02-06 DIAGNOSIS — S83.242A ACUTE MEDIAL MENISCUS TEAR OF LEFT KNEE, INITIAL ENCOUNTER: ICD-10-CM

## 2018-02-06 PROCEDURE — 97161 PT EVAL LOW COMPLEX 20 MIN: CPT | Performed by: PHYSICAL THERAPIST

## 2018-02-06 PROCEDURE — 97110 THERAPEUTIC EXERCISES: CPT | Performed by: PHYSICAL THERAPIST

## 2018-02-06 NOTE — PROGRESS NOTES
LOWER EXTREMITY EVALUATION:   Referring Physician: Dr. Dami Walters  Date of Onset: 1/24/18 Date of Service: 2/6/2018   Diagnosis: Acute medial meniscus tear of left knee, initial encounter (P85.058E)  Acute lateral meniscus tear of left knee, initial encounte instructed in and issued HEP for: supine heel slides, AROM ext and flex  Charges: PT Eval, ex1      Total Timed Treatment: 39 min     Total Treatment Time: 42 min         PLAN OF CARE:    Goals:    1) Restore 120 degrees of L knee flexion to allow normal s

## 2018-02-08 ENCOUNTER — OFFICE VISIT (OUTPATIENT)
Dept: PHYSICAL THERAPY | Facility: HOSPITAL | Age: 58
End: 2018-02-08
Attending: ORTHOPAEDIC SURGERY
Payer: MEDICARE

## 2018-02-08 DIAGNOSIS — S83.242A ACUTE MEDIAL MENISCUS TEAR OF LEFT KNEE, INITIAL ENCOUNTER: ICD-10-CM

## 2018-02-08 DIAGNOSIS — S83.282A ACUTE LATERAL MENISCUS TEAR OF LEFT KNEE, INITIAL ENCOUNTER: ICD-10-CM

## 2018-02-08 PROCEDURE — 97110 THERAPEUTIC EXERCISES: CPT | Performed by: PHYSICAL THERAPIST

## 2018-02-08 NOTE — PROGRESS NOTES
Diagnosis: Acute medial meniscus tear of left knee, initial encounter (G22.648C)  Acute lateral meniscus tear of left knee, initial encounter (J49.940F)        Authorized # of Visits:  8         Next MD visit: none scheduled  Fall Risk: standard         Pr

## 2018-02-11 RX ORDER — PAROXETINE 10 MG/1
TABLET, FILM COATED ORAL
Qty: 30 TABLET | Refills: 0 | Status: SHIPPED | OUTPATIENT
Start: 2018-02-11 | End: 2018-03-27

## 2018-02-13 ENCOUNTER — OFFICE VISIT (OUTPATIENT)
Dept: PHYSICAL THERAPY | Facility: HOSPITAL | Age: 58
End: 2018-02-13
Attending: ORTHOPAEDIC SURGERY
Payer: MEDICARE

## 2018-02-13 DIAGNOSIS — S83.242A ACUTE MEDIAL MENISCUS TEAR OF LEFT KNEE, INITIAL ENCOUNTER: ICD-10-CM

## 2018-02-13 DIAGNOSIS — S83.282A ACUTE LATERAL MENISCUS TEAR OF LEFT KNEE, INITIAL ENCOUNTER: ICD-10-CM

## 2018-02-13 PROCEDURE — 97140 MANUAL THERAPY 1/> REGIONS: CPT

## 2018-02-13 PROCEDURE — 97110 THERAPEUTIC EXERCISES: CPT

## 2018-02-13 NOTE — PROGRESS NOTES
Diagnosis: Acute medial meniscus tear of left knee, initial encounter (W10.956L)  Acute lateral meniscus tear of left knee, initial encounter (Q42.999P)        Authorized # of Visits:  2/8         Next MD visit: none scheduled  Fall Risk: standard

## 2018-02-15 ENCOUNTER — OFFICE VISIT (OUTPATIENT)
Dept: PHYSICAL THERAPY | Facility: HOSPITAL | Age: 58
End: 2018-02-15
Attending: ORTHOPAEDIC SURGERY
Payer: MEDICARE

## 2018-02-15 DIAGNOSIS — S83.242A ACUTE MEDIAL MENISCUS TEAR OF LEFT KNEE, INITIAL ENCOUNTER: ICD-10-CM

## 2018-02-15 DIAGNOSIS — S83.282A ACUTE LATERAL MENISCUS TEAR OF LEFT KNEE, INITIAL ENCOUNTER: ICD-10-CM

## 2018-02-15 PROCEDURE — 97110 THERAPEUTIC EXERCISES: CPT

## 2018-02-15 PROCEDURE — 97140 MANUAL THERAPY 1/> REGIONS: CPT

## 2018-02-15 NOTE — PROGRESS NOTES
Diagnosis: Acute medial meniscus tear of left knee, initial encounter (C91.236I)  Acute lateral meniscus tear of left knee, initial encounter (E07.685F)        Authorized # of Visits:  3/8         Next MD visit: none scheduled  Fall Risk: standard

## 2018-02-20 ENCOUNTER — OFFICE VISIT (OUTPATIENT)
Dept: PHYSICAL THERAPY | Facility: HOSPITAL | Age: 58
End: 2018-02-20
Attending: ORTHOPAEDIC SURGERY
Payer: MEDICARE

## 2018-02-20 DIAGNOSIS — S83.282A ACUTE LATERAL MENISCUS TEAR OF LEFT KNEE, INITIAL ENCOUNTER: ICD-10-CM

## 2018-02-20 DIAGNOSIS — S83.242A ACUTE MEDIAL MENISCUS TEAR OF LEFT KNEE, INITIAL ENCOUNTER: ICD-10-CM

## 2018-02-20 PROCEDURE — 97110 THERAPEUTIC EXERCISES: CPT

## 2018-02-20 NOTE — PROGRESS NOTES
Diagnosis: Acute medial meniscus tear of left knee, initial encounter (F68.346W)  Acute lateral meniscus tear of left knee, initial encounter (Y18.187P)        Authorized # of Visits:  5/8         Next MD visit: 2/23/18  Fall Risk: standard         Precaut

## 2018-02-22 ENCOUNTER — OFFICE VISIT (OUTPATIENT)
Dept: PHYSICAL THERAPY | Facility: HOSPITAL | Age: 58
End: 2018-02-22
Attending: ORTHOPAEDIC SURGERY
Payer: MEDICARE

## 2018-02-22 DIAGNOSIS — S83.282A ACUTE LATERAL MENISCUS TEAR OF LEFT KNEE, INITIAL ENCOUNTER: ICD-10-CM

## 2018-02-22 DIAGNOSIS — S83.242A ACUTE MEDIAL MENISCUS TEAR OF LEFT KNEE, INITIAL ENCOUNTER: ICD-10-CM

## 2018-02-22 PROCEDURE — 97110 THERAPEUTIC EXERCISES: CPT

## 2018-02-22 NOTE — PROGRESS NOTES
Patient Name: Venessa Holliday, : 1960, MRN: [id number]   Date:  2018  Referring Physician:  Burton Salvador    Diagnosis: No diagnosis found. Progress Summary    Pt has attended 6 visits in Physical Therapy.      Progress Note Start Date options and has agreed to actively participate in planning and for this course of care. Thank you for your referral. Please co-sign or sign and return this letter via fax as soon as possible to [phone].  If you have any questions, please contact me

## 2018-02-22 NOTE — PROGRESS NOTES
Diagnosis: Acute medial meniscus tear of left knee, initial encounter (P61.328G)  Acute lateral meniscus tear of left knee, initial encounter (B42.889E)        Authorized # of Visits:  6/8         Next MD visit: 2/23/18  Fall Risk: standard         Precaut allow safe ambulation without cane 10-15 min. - not met  3) independent with HEP for strength, ROM, core stabilization and gradual return to functional activity. - met    Plan: Continue with POC to improve strength and ROM for improved functional mobility in

## 2018-02-23 ENCOUNTER — OFFICE VISIT (OUTPATIENT)
Dept: ORTHOPEDICS CLINIC | Facility: CLINIC | Age: 58
End: 2018-02-23

## 2018-02-23 DIAGNOSIS — S83.242A ACUTE MEDIAL MENISCUS TEAR OF LEFT KNEE, INITIAL ENCOUNTER: Primary | ICD-10-CM

## 2018-02-23 DIAGNOSIS — S83.282A ACUTE LATERAL MENISCUS TEAR OF LEFT KNEE, INITIAL ENCOUNTER: ICD-10-CM

## 2018-02-23 PROCEDURE — 99024 POSTOP FOLLOW-UP VISIT: CPT | Performed by: ORTHOPAEDIC SURGERY

## 2018-02-23 PROCEDURE — G0463 HOSPITAL OUTPT CLINIC VISIT: HCPCS | Performed by: ORTHOPAEDIC SURGERY

## 2018-02-23 NOTE — PROGRESS NOTES
This is a pleasant 80-year-old female that is 4 weeks status post left knee arthroscopy, partial medial and lateral meniscectomy. Patient has had no fevers, chills, chest pain, shortness of breath. The patient has been persisting in physical therapy.   Hermann Dewitt

## 2018-02-27 ENCOUNTER — OFFICE VISIT (OUTPATIENT)
Dept: PHYSICAL THERAPY | Facility: HOSPITAL | Age: 58
End: 2018-02-27
Attending: ORTHOPAEDIC SURGERY
Payer: MEDICARE

## 2018-02-27 DIAGNOSIS — S83.242A ACUTE MEDIAL MENISCUS TEAR OF LEFT KNEE, INITIAL ENCOUNTER: ICD-10-CM

## 2018-02-27 DIAGNOSIS — S83.282A ACUTE LATERAL MENISCUS TEAR OF LEFT KNEE, INITIAL ENCOUNTER: ICD-10-CM

## 2018-02-27 PROCEDURE — 97110 THERAPEUTIC EXERCISES: CPT

## 2018-02-27 NOTE — PROGRESS NOTES
Diagnosis: Acute medial meniscus tear of left knee, initial encounter (H44.509U)  Acute lateral meniscus tear of left knee, initial encounter (N74.320V)        Authorized # of Visits:  7       Next MD visit: 2/23/18  Fall Risk: standard         Precautions

## 2018-03-01 ENCOUNTER — OFFICE VISIT (OUTPATIENT)
Dept: PHYSICAL THERAPY | Facility: HOSPITAL | Age: 58
End: 2018-03-01
Attending: ORTHOPAEDIC SURGERY
Payer: MEDICARE

## 2018-03-01 DIAGNOSIS — S83.242A ACUTE MEDIAL MENISCUS TEAR OF LEFT KNEE, INITIAL ENCOUNTER: ICD-10-CM

## 2018-03-01 DIAGNOSIS — S83.282A ACUTE LATERAL MENISCUS TEAR OF LEFT KNEE, INITIAL ENCOUNTER: ICD-10-CM

## 2018-03-01 PROCEDURE — 97140 MANUAL THERAPY 1/> REGIONS: CPT | Performed by: PHYSICAL THERAPIST

## 2018-03-01 PROCEDURE — 97110 THERAPEUTIC EXERCISES: CPT | Performed by: PHYSICAL THERAPIST

## 2018-03-01 NOTE — PROGRESS NOTES
Diagnosis: Acute medial meniscus tear of left knee, initial encounter (L77.775E)  Acute lateral meniscus tear of left knee, initial encounter (Q38.592H)        Authorized # of Visits:  8       Next MD visit: 2/23/18  Fall Risk: standard         Precautions

## 2018-03-03 PROBLEM — H10.13 ALLERGIC CONJUNCTIVITIS OF BOTH EYES: Status: ACTIVE | Noted: 2018-02-23

## 2018-03-03 PROBLEM — H35.033 HYPERTENSIVE RETINOPATHY OF BOTH EYES: Status: ACTIVE | Noted: 2018-02-23

## 2018-03-06 ENCOUNTER — OFFICE VISIT (OUTPATIENT)
Dept: PHYSICAL THERAPY | Facility: HOSPITAL | Age: 58
End: 2018-03-06
Attending: ORTHOPAEDIC SURGERY
Payer: MEDICARE

## 2018-03-06 DIAGNOSIS — S83.282A ACUTE LATERAL MENISCUS TEAR OF LEFT KNEE, INITIAL ENCOUNTER: ICD-10-CM

## 2018-03-06 DIAGNOSIS — S83.242A ACUTE MEDIAL MENISCUS TEAR OF LEFT KNEE, INITIAL ENCOUNTER: ICD-10-CM

## 2018-03-06 PROCEDURE — 97140 MANUAL THERAPY 1/> REGIONS: CPT

## 2018-03-06 PROCEDURE — 97110 THERAPEUTIC EXERCISES: CPT

## 2018-03-06 NOTE — PROGRESS NOTES
Diagnosis: Acute medial meniscus tear of left knee, initial encounter (G54.861Z)  Acute lateral meniscus tear of left knee, initial encounter (S62.029D)        Treament visit number:  9       Next MD visit: 3/23/18  Fall Risk: standard         Precautions:

## 2018-03-08 ENCOUNTER — OFFICE VISIT (OUTPATIENT)
Dept: PHYSICAL THERAPY | Facility: HOSPITAL | Age: 58
End: 2018-03-08
Attending: ORTHOPAEDIC SURGERY
Payer: MEDICARE

## 2018-03-08 DIAGNOSIS — S83.242A ACUTE MEDIAL MENISCUS TEAR OF LEFT KNEE, INITIAL ENCOUNTER: ICD-10-CM

## 2018-03-08 DIAGNOSIS — S83.282A ACUTE LATERAL MENISCUS TEAR OF LEFT KNEE, INITIAL ENCOUNTER: ICD-10-CM

## 2018-03-08 PROCEDURE — 97110 THERAPEUTIC EXERCISES: CPT

## 2018-03-08 NOTE — PROGRESS NOTES
Diagnosis: Acute medial meniscus tear of left knee, initial encounter (X79.727Q)  Acute lateral meniscus tear of left knee, initial encounter (V46.835B)        Treament visit number:  10       Next MD visit: 3/23/18  Fall Risk: standard         Precautions Treatment: 40 min  Total Treatment Time: 42 min

## 2018-03-10 RX ORDER — PAROXETINE 10 MG/1
TABLET, FILM COATED ORAL
Qty: 30 TABLET | Refills: 0 | OUTPATIENT
Start: 2018-03-10

## 2018-03-10 NOTE — TELEPHONE ENCOUNTER
Refill Protocol Appointment Criteria  · Appointment scheduled in the past 6 months or in the next 3 months  Recent Outpatient Visits            2 days ago Acute medial meniscus tear of left knee, initial encounter    [address]

## 2018-03-17 RX ORDER — PAROXETINE 10 MG/1
TABLET, FILM COATED ORAL
Qty: 30 TABLET | Refills: 0 | OUTPATIENT
Start: 2018-03-17

## 2018-03-22 ENCOUNTER — OFFICE VISIT (OUTPATIENT)
Dept: PHYSICAL THERAPY | Facility: HOSPITAL | Age: 58
End: 2018-03-22
Attending: ORTHOPAEDIC SURGERY
Payer: MEDICARE

## 2018-03-22 PROCEDURE — 97110 THERAPEUTIC EXERCISES: CPT

## 2018-03-22 NOTE — PROGRESS NOTES
Diagnosis: Acute medial meniscus tear of left knee, initial encounter (L50.110Q)  Acute lateral meniscus tear of left knee, initial encounter (J66.944B)        Treament visit number:  6       Next MD visit: 3/23/18  Fall Risk: standard         Precautions ambulates in home without cane, she has not attempted in the community  3) independent with HEP for strength, ROM, core stabilization and gradual return to functional activity.- MET            Plan: continue with POC pending MD orders to continue    Charge

## 2018-03-22 NOTE — PROGRESS NOTES
Patient Name: Ry Gould, : 1960, MRN: [id number]   Date:  3/22/2018  Referring Physician:  Jey Tam    Diagnosis: No diagnosis found. Progress Summary    Pt has attended 11 visits in Physical Therapy.      Progress Note Start Bg exercises, balance exercises, gait training       Patient/Family/Caregiver was advised of these findings, precautions, and treatment options and has agreed to actively participate in planning and for this course of care.     Thank you for your referral. Ple

## 2018-03-23 ENCOUNTER — HOSPITAL ENCOUNTER (EMERGENCY)
Facility: HOSPITAL | Age: 58
Discharge: HOME | End: 2018-03-23
Attending: EMERGENCY MEDICINE | Admitting: ORTHOPAEDIC SURGERY
Payer: MEDICARE

## 2018-03-23 ENCOUNTER — TELEPHONE (OUTPATIENT)
Dept: ORTHOPEDICS CLINIC | Facility: CLINIC | Age: 58
End: 2018-03-23

## 2018-03-23 ENCOUNTER — APPOINTMENT (OUTPATIENT)
Dept: GENERAL RADIOLOGY | Facility: HOSPITAL | Age: 58
End: 2018-03-23
Attending: EMERGENCY MEDICINE
Payer: MEDICARE

## 2018-03-23 ENCOUNTER — APPOINTMENT (OUTPATIENT)
Dept: ULTRASOUND IMAGING | Facility: HOSPITAL | Age: 58
End: 2018-03-23
Attending: EMERGENCY MEDICINE
Payer: MEDICARE

## 2018-03-23 ENCOUNTER — OFFICE VISIT (OUTPATIENT)
Dept: ORTHOPEDICS CLINIC | Facility: CLINIC | Age: 58
End: 2018-03-23

## 2018-03-23 VITALS
DIASTOLIC BLOOD PRESSURE: 55 MMHG | SYSTOLIC BLOOD PRESSURE: 129 MMHG | WEIGHT: 173 LBS | HEART RATE: 53 BPM | OXYGEN SATURATION: 98 % | RESPIRATION RATE: 10 BRPM | TEMPERATURE: 98 F | BODY MASS INDEX: 31 KG/M2

## 2018-03-23 DIAGNOSIS — S83.282A ACUTE LATERAL MENISCUS TEAR OF LEFT KNEE, INITIAL ENCOUNTER: ICD-10-CM

## 2018-03-23 DIAGNOSIS — S83.242A ACUTE MEDIAL MENISCUS TEAR OF LEFT KNEE, INITIAL ENCOUNTER: Primary | ICD-10-CM

## 2018-03-23 DIAGNOSIS — R07.9 CHEST PAIN OF UNCERTAIN ETIOLOGY: Primary | ICD-10-CM

## 2018-03-23 PROCEDURE — 84484 ASSAY OF TROPONIN QUANT: CPT | Performed by: EMERGENCY MEDICINE

## 2018-03-23 PROCEDURE — 93010 ELECTROCARDIOGRAM REPORT: CPT | Performed by: EMERGENCY MEDICINE

## 2018-03-23 PROCEDURE — 71045 X-RAY EXAM CHEST 1 VIEW: CPT | Performed by: EMERGENCY MEDICINE

## 2018-03-23 PROCEDURE — 85025 COMPLETE CBC W/AUTO DIFF WBC: CPT | Performed by: EMERGENCY MEDICINE

## 2018-03-23 PROCEDURE — 36415 COLL VENOUS BLD VENIPUNCTURE: CPT

## 2018-03-23 PROCEDURE — 80048 BASIC METABOLIC PNL TOTAL CA: CPT | Performed by: EMERGENCY MEDICINE

## 2018-03-23 PROCEDURE — 85379 FIBRIN DEGRADATION QUANT: CPT | Performed by: EMERGENCY MEDICINE

## 2018-03-23 PROCEDURE — 99024 POSTOP FOLLOW-UP VISIT: CPT | Performed by: ORTHOPAEDIC SURGERY

## 2018-03-23 PROCEDURE — G0463 HOSPITAL OUTPT CLINIC VISIT: HCPCS | Performed by: ORTHOPAEDIC SURGERY

## 2018-03-23 PROCEDURE — 99285 EMERGENCY DEPT VISIT HI MDM: CPT

## 2018-03-23 PROCEDURE — 93971 EXTREMITY STUDY: CPT | Performed by: EMERGENCY MEDICINE

## 2018-03-23 PROCEDURE — 93005 ELECTROCARDIOGRAM TRACING: CPT

## 2018-03-23 NOTE — ED NOTES
Discharged home with plan to follow up with PCP as indicated. Alert and interactive. Hemodynamically stable. Agrees with proposed care plan. Ambulates to exit with steady gait, accompanied by family.

## 2018-03-23 NOTE — PROGRESS NOTES
This is a pleasant 49-year-old female that is 8 weeks status post left knee arthroscopy and partial medial and lateral meniscectomy. The patient has had no fevers or chills. Patient reports having chest pain when ambulating greater than 1 block.   The pat

## 2018-03-23 NOTE — TELEPHONE ENCOUNTER
Pt sent to the ER from her office visit. Chest pain when she walks. No chest pain at present in the office at rest sitting. Sent to ER by Ambulance. Called Dr. Tova Pandey office. Shelly to [employer] in office and informed on above .  Sanjuana To

## 2018-03-23 NOTE — PROGRESS NOTES
In physician patient office visit/discussion pt complains of chest pain every time she walks. Sometimes she gets pain that goes into her left arm. Per Dr. Dimas Acosta. Pt needs to go to the ER to get checked out.      In room the patient thru  Julian

## 2018-03-23 NOTE — ED INITIAL ASSESSMENT (HPI)
Pt sent here from her ortho office. Initially there for evaluation of knee pain. But during screening verbalized chest pain intermittent x 2 years, noticeable when walking more than a couple of blocks associated with sob.   At this time denies any symptom

## 2018-03-24 NOTE — ED PROVIDER NOTES
Patient Seen in: [facility] Emergency Department    History   Patient presents with:  Chest Pain Angina (cardiovascular)    Stated Complaint: CHEST PAIN X 2 YEARS    HPI    59-year-old female presents for evaluation of chest pain.   Patient was at as noted above.     Physical Exam   ED Triage Vitals [03/23/18 1000]  BP: (!) 167/73  Pulse: 88  Resp: 20  Temp: (!) 97.5 °F (36.4 °C)  Temp src: Temporal  SpO2: 98 %  O2 Device: None (Room air)    Current:/55   Pulse 53   Temp (!) 97.5 °F (36.4 °C) ( Abnormality         Status                     ---------                               -----------         ------                     CBC W/ DIFFERENTIAL[[id number]]                              Final result                 Please view results

## 2018-03-27 ENCOUNTER — TELEPHONE (OUTPATIENT)
Dept: FAMILY MEDICINE CLINIC | Facility: CLINIC | Age: 58
End: 2018-03-27

## 2018-03-27 RX ORDER — PAROXETINE 10 MG/1
10 TABLET, FILM COATED ORAL EVERY MORNING
Qty: 30 TABLET | Refills: 2 | Status: SHIPPED | OUTPATIENT
Start: 2018-03-27 | End: 2018-04-11

## 2018-03-27 RX ORDER — PAROXETINE 10 MG/1
TABLET, FILM COATED ORAL
Qty: 30 TABLET | Refills: 0 | OUTPATIENT
Start: 2018-03-27

## 2018-03-28 ENCOUNTER — TELEPHONE (OUTPATIENT)
Dept: ORTHOPEDICS CLINIC | Facility: CLINIC | Age: 58
End: 2018-03-28

## 2018-03-28 NOTE — TELEPHONE ENCOUNTER
Pt seen in the ER last Friday, sent from Dr. Marcial Denise office. Uses  [facility] #[id number]  Pt is feeling ok.    Does not get the pain all the time in the Chest.   Does have an appointment April 11 with primary  Was told by the sphysician in the ER that all

## 2018-03-29 ENCOUNTER — OFFICE VISIT (OUTPATIENT)
Dept: PHYSICAL THERAPY | Facility: HOSPITAL | Age: 58
End: 2018-03-29
Attending: ORTHOPAEDIC SURGERY
Payer: MEDICARE

## 2018-03-29 PROCEDURE — 97110 THERAPEUTIC EXERCISES: CPT

## 2018-03-29 NOTE — PROGRESS NOTES
Diagnosis: Acute medial meniscus tear of left knee, initial encounter (S12.875Q)  Acute lateral meniscus tear of left knee, initial encounter (Z27.655M)        Treament visit number:  12       Next MD visit: 4/13/18  Fall Risk: standard         Precautions

## 2018-04-05 ENCOUNTER — OFFICE VISIT (OUTPATIENT)
Dept: PHYSICAL THERAPY | Facility: HOSPITAL | Age: 58
End: 2018-04-05
Attending: ORTHOPAEDIC SURGERY
Payer: MEDICARE

## 2018-04-05 PROCEDURE — 97110 THERAPEUTIC EXERCISES: CPT

## 2018-04-05 RX ORDER — PRAVASTATIN SODIUM 20 MG
TABLET ORAL
Qty: 90 TABLET | Refills: 1 | OUTPATIENT
Start: 2018-04-05

## 2018-04-05 NOTE — PROGRESS NOTES
Diagnosis: Acute medial meniscus tear of left knee, initial encounter (K62.315X)  Acute lateral meniscus tear of left knee, initial encounter (H39.368Q)      Treament visit number:  15       Next MD visit: 4/13/18  Fall Risk: standard         Precautions:

## 2018-04-09 ENCOUNTER — OFFICE VISIT (OUTPATIENT)
Dept: RHEUMATOLOGY | Facility: CLINIC | Age: 58
End: 2018-04-09

## 2018-04-09 VITALS
HEIGHT: 60 IN | BODY MASS INDEX: 36.32 KG/M2 | DIASTOLIC BLOOD PRESSURE: 74 MMHG | SYSTOLIC BLOOD PRESSURE: 129 MMHG | TEMPERATURE: 98 F | HEART RATE: 60 BPM | WEIGHT: 185 LBS

## 2018-04-09 DIAGNOSIS — M25.562 PAIN IN BOTH KNEES, UNSPECIFIED CHRONICITY: Primary | ICD-10-CM

## 2018-04-09 DIAGNOSIS — M79.7 FIBROMYALGIA: ICD-10-CM

## 2018-04-09 DIAGNOSIS — M25.561 PAIN IN BOTH KNEES, UNSPECIFIED CHRONICITY: Primary | ICD-10-CM

## 2018-04-09 PROCEDURE — G0463 HOSPITAL OUTPT CLINIC VISIT: HCPCS | Performed by: INTERNAL MEDICINE

## 2018-04-09 PROCEDURE — 99214 OFFICE O/P EST MOD 30 MIN: CPT | Performed by: INTERNAL MEDICINE

## 2018-04-09 RX ORDER — OLOPATADINE HYDROCHLORIDE 1 MG/ML
SOLUTION/ DROPS OPHTHALMIC
Refills: 12 | COMMUNITY
Start: 2018-03-26 | End: 2018-07-11

## 2018-04-09 RX ORDER — GABAPENTIN 300 MG/1
300 CAPSULE ORAL NIGHTLY
Qty: 90 CAPSULE | Refills: 1 | Status: SHIPPED | OUTPATIENT
Start: 2018-04-09 | End: 2018-10-04

## 2018-04-09 NOTE — PATIENT INSTRUCTIONS
1. Cont. Gabapentin 300mg at night   2. Diclofenac 1 % gel  3. follow up with dr. Patricia Calhoun for knee - appt. On 4/13   4. Follow up with in 6 months.

## 2018-04-09 NOTE — PROGRESS NOTES
Ирина Joseph is a 62year old female who presents for Patient presents with:  Fibromyalgia Syndrome  Osteoarthritis  Joint Pain  . HPI:     I had the pleasure of seeing Ирина Joseph on 7/6/2017 for evaluation.      She is a pleasant 64year old who has feeling ok with the gabpentin 300mg at night. She tapered from 400mg b/c it made her too dizzy. She has no drowsiness or dizziness.            Wt Readings from Last 2 Encounters:  04/09/18 : 185 lb (83.9 kg)  03/23/18 : 173 lb (78.5 kg)    Body mass index Grandfather    • Other [place] Paternal Grandmother    • Other [place] Paternal Grandfather    • No Known Problems Daughter    • No Known Problems Son    • No Known Problems Brother    • No Known Problems Brother    • No Known Problems Brother    • No Know no HSM felt, BS positive  Joint exam:   14/18 tender points  - better  No synvoits noted   Not Tender in 1-5th mcps and pisps b/l in hands.    EXTREMITIES: no cyanosis, clubbing or edema  NEURO: intact touch, 5/5 ue and le strength    Component      Latest 88 - 201 mg/dL  134   COMPLEMENT C4      18 - 55 mg/dL  27   Anti-Javed Antibody      Negative  Negative   Anti-Javed/RNP Antibody      Negative  Negative   Anti Double Strand DNA      <10  <10   RHEUMATOID FACTOR      <11 IU/mL <5.0 6   C-Citrullinated Pe Absolute      0.0 - 1.0 K/UL 0.6     Eosinophils Absolute      0.0 - 0.7 K/UL 0.3     Basophils Absolute      0.0 - 0.2 K/UL 0.1     URINE-COLOR      Yellow   Yellow   CLARITY URINE      Clear   Hazy (A)   SPECIFIC GRAVITY      1.002 - 1.035   1.026   PH, doxepins at bedtime - caused dizziness. - tried cyclobenzaprine in tihe past as well - she can't remember -she declines anymore muscle relaxant at night.    - d/w her to stop paroxtine again   - she felt amitriptyline is not better than paroxtein - so she

## 2018-04-10 ENCOUNTER — OFFICE VISIT (OUTPATIENT)
Dept: PHYSICAL THERAPY | Facility: HOSPITAL | Age: 58
End: 2018-04-10
Attending: ORTHOPAEDIC SURGERY
Payer: MEDICARE

## 2018-04-10 PROCEDURE — 97110 THERAPEUTIC EXERCISES: CPT

## 2018-04-10 NOTE — PROGRESS NOTES
Diagnosis: Acute medial meniscus tear of left knee, initial encounter (U47.492E)  Acute lateral meniscus tear of left knee, initial encounter (P92.556N)      Treament visit number:  15       Next MD visit: 4/13/18  Fall Risk: standard         Precautions: hooklying TrA activaition    Assessment: Pt continues to demonstrate swelling along left knee and decreased left quad contraction.       Goals:   Goals     • Therapy Goals            1) Restore 120 degrees of L knee flexion to allow normal stair negotiation

## 2018-04-11 ENCOUNTER — OFFICE VISIT (OUTPATIENT)
Dept: FAMILY MEDICINE CLINIC | Facility: CLINIC | Age: 58
End: 2018-04-11

## 2018-04-11 VITALS
DIASTOLIC BLOOD PRESSURE: 70 MMHG | RESPIRATION RATE: 16 BRPM | WEIGHT: 182 LBS | OXYGEN SATURATION: 96 % | BODY MASS INDEX: 33.92 KG/M2 | HEART RATE: 64 BPM | SYSTOLIC BLOOD PRESSURE: 120 MMHG | HEIGHT: 61.5 IN

## 2018-04-11 DIAGNOSIS — E78.00 HYPERCHOLESTEREMIA: ICD-10-CM

## 2018-04-11 DIAGNOSIS — Z13.31 DEPRESSION SCREENING: ICD-10-CM

## 2018-04-11 DIAGNOSIS — Z11.59 NEED FOR HEPATITIS C SCREENING TEST: ICD-10-CM

## 2018-04-11 DIAGNOSIS — K31.7 GASTRIC POLYPS: ICD-10-CM

## 2018-04-11 DIAGNOSIS — F32.4 MAJOR DEPRESSIVE DISORDER WITH SINGLE EPISODE, IN PARTIAL REMISSION (HCC): ICD-10-CM

## 2018-04-11 DIAGNOSIS — M79.7 FIBROMYALGIA: ICD-10-CM

## 2018-04-11 DIAGNOSIS — E66.09 NON MORBID OBESITY DUE TO EXCESS CALORIES: ICD-10-CM

## 2018-04-11 DIAGNOSIS — H10.13 ALLERGIC CONJUNCTIVITIS OF BOTH EYES: ICD-10-CM

## 2018-04-11 DIAGNOSIS — K44.9 HIATAL HERNIA: ICD-10-CM

## 2018-04-11 DIAGNOSIS — G47.61 PERIODIC LIMB MOVEMENT DISORDER (PLMD): ICD-10-CM

## 2018-04-11 DIAGNOSIS — K64.8 INTERNAL HEMORRHOIDS: ICD-10-CM

## 2018-04-11 DIAGNOSIS — K21.9 GASTROESOPHAGEAL REFLUX DISEASE WITHOUT ESOPHAGITIS: ICD-10-CM

## 2018-04-11 DIAGNOSIS — H35.033 HYPERTENSIVE RETINOPATHY OF BOTH EYES: ICD-10-CM

## 2018-04-11 DIAGNOSIS — M15.9 PRIMARY OSTEOARTHRITIS INVOLVING MULTIPLE JOINTS: ICD-10-CM

## 2018-04-11 DIAGNOSIS — R73.01 IMPAIRED FASTING GLUCOSE: ICD-10-CM

## 2018-04-11 DIAGNOSIS — I10 ESSENTIAL HYPERTENSION: ICD-10-CM

## 2018-04-11 DIAGNOSIS — H25.13 AGE-RELATED NUCLEAR CATARACT OF BOTH EYES: ICD-10-CM

## 2018-04-11 DIAGNOSIS — Z12.31 ENCOUNTER FOR SCREENING MAMMOGRAM FOR BREAST CANCER: ICD-10-CM

## 2018-04-11 DIAGNOSIS — G47.33 OSA (OBSTRUCTIVE SLEEP APNEA): ICD-10-CM

## 2018-04-11 DIAGNOSIS — H02.889 MGD (MEIBOMIAN GLAND DYSFUNCTION): ICD-10-CM

## 2018-04-11 DIAGNOSIS — Z00.00 ENCOUNTER FOR MEDICARE ANNUAL WELLNESS EXAM: Primary | ICD-10-CM

## 2018-04-11 DIAGNOSIS — Z98.890 S/P MEDIAL MENISCUS REPAIR OF LEFT KNEE: ICD-10-CM

## 2018-04-11 DIAGNOSIS — Z98.890 S/P LATERAL MENISCUS REPAIR OF LEFT KNEE: ICD-10-CM

## 2018-04-11 PROBLEM — Z01.818 ENCOUNTER FOR PRE-OPERATIVE EXAMINATION: Status: RESOLVED | Noted: 2018-01-11 | Resolved: 2018-04-11

## 2018-04-11 PROBLEM — H53.8 BLURRY VISION, BILATERAL: Status: RESOLVED | Noted: 2017-11-29 | Resolved: 2018-04-11

## 2018-04-11 PROBLEM — S83.232A COMPLEX TEAR OF MEDIAL MENISCUS OF LEFT KNEE AS CURRENT INJURY: Status: RESOLVED | Noted: 2018-01-11 | Resolved: 2018-04-11

## 2018-04-11 PROCEDURE — G0439 PPPS, SUBSEQ VISIT: HCPCS

## 2018-04-11 PROCEDURE — 99213 OFFICE O/P EST LOW 20 MIN: CPT

## 2018-04-11 PROCEDURE — G0444 DEPRESSION SCREEN ANNUAL: HCPCS

## 2018-04-11 RX ORDER — PRAVASTATIN SODIUM 20 MG
20 TABLET ORAL NIGHTLY
Qty: 90 TABLET | Refills: 3 | Status: SHIPPED | OUTPATIENT
Start: 2018-04-11 | End: 2019-04-20

## 2018-04-11 RX ORDER — METOPROLOL SUCCINATE 25 MG/1
12.5 TABLET, EXTENDED RELEASE ORAL DAILY
Qty: 45 TABLET | Refills: 0 | Status: SHIPPED | OUTPATIENT
Start: 2018-04-11 | End: 2018-07-11

## 2018-04-11 RX ORDER — PANTOPRAZOLE SODIUM 40 MG/1
40 TABLET, DELAYED RELEASE ORAL
Qty: 90 TABLET | Refills: 3 | Status: SHIPPED | OUTPATIENT
Start: 2018-04-11 | End: 2019-04-20

## 2018-04-11 RX ORDER — PAROXETINE 10 MG/1
10 TABLET, FILM COATED ORAL EVERY MORNING
Qty: 90 TABLET | Refills: 3 | Status: SHIPPED | OUTPATIENT
Start: 2018-04-11 | End: 2019-04-20

## 2018-04-11 RX ORDER — ASPIRIN 81 MG/1
81 TABLET ORAL DAILY
Qty: 90 TABLET | Refills: 3 | Status: SHIPPED | OUTPATIENT
Start: 2018-04-11 | End: 2019-04-20

## 2018-04-12 ENCOUNTER — OFFICE VISIT (OUTPATIENT)
Dept: PHYSICAL THERAPY | Facility: HOSPITAL | Age: 58
End: 2018-04-12
Attending: ORTHOPAEDIC SURGERY
Payer: MEDICARE

## 2018-04-12 PROCEDURE — 97110 THERAPEUTIC EXERCISES: CPT

## 2018-04-12 NOTE — PROGRESS NOTES
Patient Name: Bryson Platt, : 1960, MRN: [id number]   Date:  2018  Referring Physician:  Reece Vidal    Diagnosis: No diagnosis found. Progress Summary    Pt has attended 15 visits in Physical Therapy.      Progress Note Start Bg core stabilization and gradual return to functional activity.- MET              Rehab Potential: fair    Plan: follow up with MD for further evaluation      Patient/Family/Caregiver was advised of these findings, precautions, and treatment options and has

## 2018-04-12 NOTE — PROGRESS NOTES
HPI:   Carmella Ibrahim is a 62year old female who presents for a Medicare Subsequent Annual Wellness visit (Pt already had Initial Annual Wellness). Pt denies any acute complaints at this time.        Fall/Risk Assessment abnormal    She has been screened screening of functional status. Problems with daily activities? : Yes   She has problems with Memory based on screening of functional status.    Memory Problems?: Yes       Depression Screening (PHQ-2/PHQ-9): Over the LAST 2 WEEKS   Little interest or ple forms available to patient in AVS         She has never smoked tobacco.    CAGE Alcohol screening   Massey Edu was screened for Alcohol abuse and had a score of 0 so is at low risk.     Patient Care Team: Patient Care Team:  Brionna Richard MD as PCP - CA 9.1 03/23/2018   ALB 3.6 12/22/2017   TSH 2.77 09/01/2015   CREATSERUM 0.78 03/23/2018   GLU 97 03/23/2018        CBC  (most recent labs)     Lab Results  Component Value Date   WBC 6.8 03/23/2018   HGB 13.6 03/23/2018    03/23/2018        LAYLA HISTORY:   She  reports that she has never smoked. She has never used smokeless tobacco. She reports that she does not drink alcohol or use drugs.      REVIEW OF SYSTEMS:   GENERAL: feels well otherwise  SKIN: denies any unusual skin lesions  EYES: denies d Lips, mucosa, and tongue normal; teeth and gums normal   Neck: Supple, symmetrical, trachea midline, no adenopathy;  thyroid: not enlarged, symmetric, no tenderness/mass/nodules; no carotid bruit or JVD   Back:   Symmetric, no curvature, ROM normal, no CVA daily.    Hiatal hernia  -     Stable; will continue watchful monitoring for now. Gastroesophageal reflux disease without esophagitis  -     Pantoprazole Sodium 40 MG Oral Tab EC;  Take 1 tablet (40 mg total) by mouth every morning before breakfast.    G Future    Encounter for screening mammogram for breast cancer  -     [facility] SCREENING BILAT (CPT=77067); Future    Non morbid obesity due to excess calories  BMI 33.0-33.9,adult  -     Pt counseled with regards to diet and exercise.        Diet assessment: goo Fecal Occult Blood Annually No results found for: FOB No flowsheet data found. Glaucoma Screening      Ophthalmology Visit Annually: Diabetics, FHx Glaucoma, AA>50, > 65 No flowsheet data found.     Bone Density Screening      Dexascan Every two Medications (ACE/ARB, digoxin diuretics, anticonvulsants.)    Potassium  Annually Potassium (mmol/L)   Date Value   03/23/2018 3.9     POTASSIUM (P) (mmol/L)   Date Value   09/27/2016 4.1    No flowsheet data found.     Creatinine  Annually Creatinine (mg

## 2018-04-12 NOTE — PROGRESS NOTES
Diagnosis: Acute medial meniscus tear of left knee, initial encounter (A22.204N)  Acute lateral meniscus tear of left knee, initial encounter (J00.716U)      Treament visit number:  13       Next MD visit: 4/13/18  Fall Risk: standard         Precautions: Goals:   Goals     • Therapy Goals            1) Restore 120 degrees of L knee flexion to allow normal stair negotiation- IN PROGRESS, left knee flex 124 deg but unable to negotiate stairs reciprocally.   2) restore 5/5 L knee strength to allow safe amb

## 2018-04-12 NOTE — PATIENT INSTRUCTIONS
Rosa Guillory's SCREENING SCHEDULE   Tests on this list are recommended by your physician but may not be covered, or covered at this frequency, by your insurer. Please check with your insurance carrier before scheduling to verify coverage.    PREVENTATIVE their lifetime   • Anyone with a family history    Colorectal Cancer Screening  Covered up to Age 76     Colonoscopy Screen   Covered every 10 years- more often if abnormal Colonoscopy,10 Years due on 09/08/2025 Update Health Maintenance if applicable    F this or any previous visit. Please get once after your 65th birthday    Pneumococcal 23 (Pneumovax)  Covered Once after 65 No orders found for this or any previous visit.  Please get once after your 65th birthday    Hepatitis B for Moderate/High Risk

## 2018-04-13 ENCOUNTER — HOSPITAL ENCOUNTER (OUTPATIENT)
Dept: GENERAL RADIOLOGY | Facility: HOSPITAL | Age: 58
Discharge: HOME | End: 2018-04-13
Attending: ORTHOPAEDIC SURGERY
Payer: MEDICARE

## 2018-04-13 ENCOUNTER — OFFICE VISIT (OUTPATIENT)
Dept: ORTHOPEDICS CLINIC | Facility: CLINIC | Age: 58
End: 2018-04-13

## 2018-04-13 VITALS — HEART RATE: 56 BPM | SYSTOLIC BLOOD PRESSURE: 123 MMHG | DIASTOLIC BLOOD PRESSURE: 66 MMHG | RESPIRATION RATE: 16 BRPM

## 2018-04-13 DIAGNOSIS — M25.562 ACUTE PAIN OF LEFT KNEE: Primary | ICD-10-CM

## 2018-04-13 DIAGNOSIS — M54.50 LUMBAR SPINE PAIN: ICD-10-CM

## 2018-04-13 PROCEDURE — 20610 DRAIN/INJ JOINT/BURSA W/O US: CPT | Performed by: ORTHOPAEDIC SURGERY

## 2018-04-13 PROCEDURE — 99024 POSTOP FOLLOW-UP VISIT: CPT | Performed by: ORTHOPAEDIC SURGERY

## 2018-04-13 NOTE — PROGRESS NOTES
This is a pleasant 55-year-old female that he is 11 weeks status post left knee arthroscopy partial medial lateral meniscectomy. The patient has had no fevers or chills. The patient comes in today for repeat evaluation.   Patient reports that her physical

## 2018-04-13 NOTE — PROGRESS NOTES
Per verbal order from [facility], draw up 4ml of 1% lidocaine and 2ml of Kenalog 10 for cortisone injection to left knee Pati Sanders RN

## 2018-04-17 ENCOUNTER — OFFICE VISIT (OUTPATIENT)
Dept: PHYSICAL THERAPY | Facility: HOSPITAL | Age: 58
End: 2018-04-17
Attending: ORTHOPAEDIC SURGERY
Payer: MEDICARE

## 2018-04-17 PROCEDURE — 97110 THERAPEUTIC EXERCISES: CPT

## 2018-04-17 NOTE — PROGRESS NOTES
Diagnosis: Acute medial meniscus tear of left knee, initial encounter (K21.045P)  Acute lateral meniscus tear of left knee, initial encounter (N54.631B)      Treament visit number:  12       Next MD visit: 4/13/18  Fall Risk: standard         Precautions: uncomfortable tightness in left knee  3) independent with HEP for strength, ROM, core stabilization and gradual return to functional activity.- MET            Plan: continue with POC to improve hip and knee strengthening to improve functional mobility    C

## 2018-04-19 ENCOUNTER — OFFICE VISIT (OUTPATIENT)
Dept: PHYSICAL THERAPY | Facility: HOSPITAL | Age: 58
End: 2018-04-19
Attending: ORTHOPAEDIC SURGERY
Payer: MEDICARE

## 2018-04-19 PROCEDURE — 97110 THERAPEUTIC EXERCISES: CPT

## 2018-04-19 NOTE — PROGRESS NOTES
Diagnosis: Acute medial meniscus tear of left knee, initial encounter (V16.174W)  Acute lateral meniscus tear of left knee, initial encounter (U00.223X)      Treament visit number:  16       Next MD visit: 5/15/18  Fall Risk: standard         Precautions: continue with POC to improve hip and knee strengthening to improve functional mobility    Charges: TEx2  Total Timed Treatment: 35 min  Total Treatment Time: 36 min

## 2018-04-24 ENCOUNTER — OFFICE VISIT (OUTPATIENT)
Dept: PHYSICAL THERAPY | Facility: HOSPITAL | Age: 58
End: 2018-04-24
Attending: ORTHOPAEDIC SURGERY
Payer: MEDICARE

## 2018-04-24 PROCEDURE — 97110 THERAPEUTIC EXERCISES: CPT

## 2018-04-24 NOTE — PROGRESS NOTES
Diagnosis: Acute medial meniscus tear of left knee, initial encounter (Y83.649I)  Acute lateral meniscus tear of left knee, initial encounter (V62.693X)      Treament visit number:  25       Next MD visit: 5/15/18  Fall Risk: standard         Precautions: knee  3) independent with HEP for strength, ROM, core stabilization and gradual return to functional activity.- MET            Plan: continue with POC to improve hip and knee strengthening to improve functional mobility    Charges: TEx3  Total Timed Treatm

## 2018-04-25 ENCOUNTER — HOSPITAL ENCOUNTER (OUTPATIENT)
Dept: MAMMOGRAPHY | Facility: HOSPITAL | Age: 58
Discharge: HOME | End: 2018-04-25
Payer: MEDICARE

## 2018-04-25 DIAGNOSIS — Z12.31 ENCOUNTER FOR SCREENING MAMMOGRAM FOR BREAST CANCER: ICD-10-CM

## 2018-04-25 PROCEDURE — 77067 SCR MAMMO BI INCL CAD: CPT

## 2018-05-01 ENCOUNTER — OFFICE VISIT (OUTPATIENT)
Dept: PHYSICAL THERAPY | Facility: HOSPITAL | Age: 58
End: 2018-05-01
Attending: ORTHOPAEDIC SURGERY
Payer: MEDICARE

## 2018-05-01 PROCEDURE — 97110 THERAPEUTIC EXERCISES: CPT

## 2018-05-01 NOTE — PROGRESS NOTES
Diagnosis: Acute medial meniscus tear of left knee, initial encounter (X69.107U)  Acute lateral meniscus tear of left knee, initial encounter (V08.532E)      Treament visit number:  23       Next MD visit: 5/15/18  Fall Risk: standard         Precautions: ROM, core stabilization and gradual return to functional activity.- MET            Plan: continue with POC to improve hip and knee strengthening to improve functional mobility    Charges: TEx3  Total Timed Treatment: 43 min  Total Treatment Time: 45 min

## 2018-05-08 ENCOUNTER — OFFICE VISIT (OUTPATIENT)
Dept: PHYSICAL THERAPY | Facility: HOSPITAL | Age: 58
End: 2018-05-08
Attending: ORTHOPAEDIC SURGERY
Payer: MEDICARE

## 2018-05-08 PROCEDURE — 97110 THERAPEUTIC EXERCISES: CPT

## 2018-05-08 NOTE — PROGRESS NOTES
Diagnosis: Acute medial meniscus tear of left knee, initial encounter (P79.212P)  Acute lateral meniscus tear of left knee, initial encounter (G07.206T)      Treament visit number:  21      Next MD visit: 5/18/18  Fall Risk: standard         Precautions: n stabilization and gradual return to functional activity.- MET            Plan: re-asses next visit for possible discharge    Charges: TEx3  Total Timed Treatment: 40 min  Total Treatment Time: 43 min

## 2018-05-10 ENCOUNTER — OFFICE VISIT (OUTPATIENT)
Dept: PHYSICAL THERAPY | Facility: HOSPITAL | Age: 58
End: 2018-05-10
Attending: ORTHOPAEDIC SURGERY
Payer: MEDICARE

## 2018-05-10 PROCEDURE — 97110 THERAPEUTIC EXERCISES: CPT

## 2018-05-10 NOTE — PROGRESS NOTES
Diagnosis: Acute medial meniscus tear of left knee, initial encounter (G76.220B)  Acute lateral meniscus tear of left knee, initial encounter (M22.116L)      Treament visit number:  21      Next MD visit: 5/18/18  Fall Risk: standard         Precautions: n this time.       Goals:   Goals     • Therapy Goals            1) Restore 120 degrees of L knee flexion to allow normal stair negotiation- MET, able to go up/down reciprocally but fatigues quickly  2) restore 5/5 L knee strength to allow safe ambulation wit

## 2018-05-18 ENCOUNTER — OFFICE VISIT (OUTPATIENT)
Dept: ORTHOPEDICS CLINIC | Facility: CLINIC | Age: 58
End: 2018-05-18

## 2018-05-18 ENCOUNTER — HOSPITAL ENCOUNTER (OUTPATIENT)
Dept: GENERAL RADIOLOGY | Facility: HOSPITAL | Age: 58
Discharge: HOME | End: 2018-05-18
Attending: ORTHOPAEDIC SURGERY | Admitting: ORTHOPAEDIC SURGERY
Payer: MEDICARE

## 2018-05-18 DIAGNOSIS — M54.5 LOW BACK PAIN, UNSPECIFIED BACK PAIN LATERALITY, UNSPECIFIED CHRONICITY, WITH SCIATICA PRESENCE UNSPECIFIED: ICD-10-CM

## 2018-05-18 DIAGNOSIS — M51.36 DDD (DEGENERATIVE DISC DISEASE), LUMBAR: Primary | ICD-10-CM

## 2018-05-18 DIAGNOSIS — S83.242A ACUTE MEDIAL MENISCUS TEAR OF LEFT KNEE, INITIAL ENCOUNTER: ICD-10-CM

## 2018-05-18 PROCEDURE — 99214 OFFICE O/P EST MOD 30 MIN: CPT | Performed by: ORTHOPAEDIC SURGERY

## 2018-05-18 PROCEDURE — 72120 X-RAY BEND ONLY L-S SPINE: CPT | Performed by: ORTHOPAEDIC SURGERY

## 2018-05-18 PROCEDURE — G0463 HOSPITAL OUTPT CLINIC VISIT: HCPCS | Performed by: ORTHOPAEDIC SURGERY

## 2018-05-18 NOTE — PROGRESS NOTES
This is a pleasant 59-year-old female that is 15 weeks status post left knee arthroscopy and partial medial and lateral meniscectomy. Patient has had no chest pain shortness of breath.   She had relief from the cortisone injection was given to her at the l

## 2018-06-27 NOTE — TELEPHONE ENCOUNTER
Pharmacist questioning if pt should be taking both Amitriptyline and Paroxetine. One was prescribed by you and other one by Dr. Lottie Kunz for the same problem. Please advise.
Please call the patient and have the patient stop the paroxetine. She can continue taking the amitriptyline prescribed by Dr. Babak Bella the patient and relay the information.  Thanks
Pts daughter in law calling and stts the pharmacy advised the pt and herself that the two meds below are for the same issue.  One med was prescribed by Dr. Emily Mar and the other med was prescribed by Dr. Israel Enriquez  Therefore the pt would like to know if sh
Spoke with daughter in law Italia Solomon ([facility] verified) and relayed EG message in regards to stopping the Paroxetine and continue with amitriptyline. Verbalized understanding and agreement.
Regular rate and rhythm, Heart sounds S1 S2 present, no murmurs, rubs or gallops

## 2018-07-08 DIAGNOSIS — I10 ESSENTIAL HYPERTENSION: ICD-10-CM

## 2018-07-09 RX ORDER — METOPROLOL SUCCINATE 25 MG/1
12.5 TABLET, EXTENDED RELEASE ORAL DAILY
Qty: 45 TABLET | Refills: 0 | OUTPATIENT
Start: 2018-07-09

## 2018-07-11 ENCOUNTER — OFFICE VISIT (OUTPATIENT)
Dept: FAMILY MEDICINE CLINIC | Facility: CLINIC | Age: 58
End: 2018-07-11

## 2018-07-11 VITALS
WEIGHT: 185 LBS | BODY MASS INDEX: 34 KG/M2 | OXYGEN SATURATION: 98 % | HEART RATE: 62 BPM | DIASTOLIC BLOOD PRESSURE: 70 MMHG | SYSTOLIC BLOOD PRESSURE: 130 MMHG

## 2018-07-11 DIAGNOSIS — H10.13 ALLERGIC CONJUNCTIVITIS OF BOTH EYES: ICD-10-CM

## 2018-07-11 DIAGNOSIS — I10 ESSENTIAL HYPERTENSION: Primary | ICD-10-CM

## 2018-07-11 PROBLEM — Z12.31 ENCOUNTER FOR SCREENING MAMMOGRAM FOR BREAST CANCER: Status: RESOLVED | Noted: 2018-04-11 | Resolved: 2018-07-11

## 2018-07-11 PROBLEM — Z13.31 DEPRESSION SCREENING: Status: RESOLVED | Noted: 2018-04-11 | Resolved: 2018-07-11

## 2018-07-11 PROBLEM — Z00.00 ENCOUNTER FOR MEDICARE ANNUAL WELLNESS EXAM: Status: RESOLVED | Noted: 2018-04-11 | Resolved: 2018-07-11

## 2018-07-11 PROCEDURE — 99214 OFFICE O/P EST MOD 30 MIN: CPT

## 2018-07-11 RX ORDER — METOPROLOL SUCCINATE 25 MG/1
12.5 TABLET, EXTENDED RELEASE ORAL DAILY
Qty: 45 TABLET | Refills: 0 | Status: SHIPPED | OUTPATIENT
Start: 2018-07-11 | End: 2018-10-16

## 2018-07-11 RX ORDER — AZELASTINE HYDROCHLORIDE 0.5 MG/ML
1 SOLUTION/ DROPS OPHTHALMIC 2 TIMES DAILY
Qty: 1 BOTTLE | Refills: 0 | Status: SHIPPED | OUTPATIENT
Start: 2018-07-11 | End: 2018-10-16

## 2018-07-11 NOTE — PROGRESS NOTES
HPI:    Patient ID: Donte Russell is a 62year old female. Hypertension   This is a chronic problem. Episode onset: few years ago. The problem has been waxing and waning since onset. The problem is controlled.  Pertinent negatives include no anxiety, pa arthralgias, back pain and neck pain. Skin: Negative for rash. Neurological: Negative for dizziness, syncope, light-headedness and headaches. All other systems reviewed and are negative.              Current Outpatient Prescriptions:  Metoprolol [employer] BP monitoring on next scheduled appointment. Refill for Metoprolol succinate given. 2. Allergic conjunctivitis of both eyes  Clinical course, prognosis, and treatment options of disease process discussed with pt.   Olopatadine HCl 0.1% ophthalmic soluti

## 2018-07-11 NOTE — PATIENT INSTRUCTIONS
Conjuntivitis, Alérgica [Conjunctivitis, Allergic]    La conjuntivitis alérgica (Allergic Conjunctivitis) es aaliyah reacción al polvo o el polen que pudiese josé luis en el aire. Provoca comezón y enrojecimiento en las membranas de los párpados.  También puede p si algo de lo siguiente ocurre:  · Mayor hinchazón del párpado. · [address], nueva o que empeora. · Mayor enrojecimiento alrededor del daniel. · Hinchazón de la enzo.   Date Last Reviewed: 6/14/2015  © 8729-7594 [employer]

## 2018-07-17 ENCOUNTER — OFFICE VISIT (OUTPATIENT)
Dept: PHYSICAL THERAPY | Facility: HOSPITAL | Age: 58
End: 2018-07-17
Attending: ORTHOPAEDIC SURGERY
Payer: MEDICARE

## 2018-07-17 DIAGNOSIS — M51.36 DDD (DEGENERATIVE DISC DISEASE), LUMBAR: ICD-10-CM

## 2018-07-17 DIAGNOSIS — S83.242A ACUTE MEDIAL MENISCUS TEAR OF LEFT KNEE, INITIAL ENCOUNTER: ICD-10-CM

## 2018-07-17 PROCEDURE — 97163 PT EVAL HIGH COMPLEX 45 MIN: CPT

## 2018-07-17 NOTE — PROGRESS NOTES
LOWER EXTREMITY EVALUATION:   Referring Physician: Dr. Masoud Campbell  Date of Onset: 1/24/18 DOS Date of Service: 7/17/2018   DDD (degenerative disc disease), lumbar (M51.36)  Acute medial meniscus tear of left knee, initial encounter (D55.356L)  PATIENT SUMMAR this evaluation involved High Complexity decision making due to 3+ personal factors/comorbidities, 4+ body structures involved/activity limitations, and unstable symptoms including changing pain levels.     Marifer Heller would benefit from skilled Physical Therapy Treatment will include: Gait training, Manual Therapy, Neuromuscular Re-education, Therapeutic Activities, Therapeutic Exercise and Home Exercise Program instruction    Education or treatment limitation: None  Rehab Potential: good    FOTO: 47/100  Current

## 2018-07-20 ENCOUNTER — OFFICE VISIT (OUTPATIENT)
Dept: PHYSICAL THERAPY | Facility: HOSPITAL | Age: 58
End: 2018-07-20
Attending: ORTHOPAEDIC SURGERY
Payer: MEDICARE

## 2018-07-20 DIAGNOSIS — S83.242A ACUTE MEDIAL MENISCUS TEAR OF LEFT KNEE, INITIAL ENCOUNTER: ICD-10-CM

## 2018-07-20 DIAGNOSIS — M51.36 DDD (DEGENERATIVE DISC DISEASE), LUMBAR: ICD-10-CM

## 2018-07-20 PROCEDURE — 97110 THERAPEUTIC EXERCISES: CPT

## 2018-07-20 NOTE — PROGRESS NOTES
Diagnosis: DDD (degenerative disc disease), lumbar (M51.36)  Acute medial meniscus tear of left knee, initial encounter (C36.743S)  Authorized # of Visits:  8 POC (Medicare)        Next MD visit: none scheduled  Fall Risk: standard         Precautions:

## 2018-07-24 ENCOUNTER — OFFICE VISIT (OUTPATIENT)
Dept: PHYSICAL THERAPY | Facility: HOSPITAL | Age: 58
End: 2018-07-24
Attending: ORTHOPAEDIC SURGERY
Payer: MEDICARE

## 2018-07-24 DIAGNOSIS — S83.242A ACUTE MEDIAL MENISCUS TEAR OF LEFT KNEE, INITIAL ENCOUNTER: ICD-10-CM

## 2018-07-24 DIAGNOSIS — M51.36 DDD (DEGENERATIVE DISC DISEASE), LUMBAR: ICD-10-CM

## 2018-07-24 PROCEDURE — 97110 THERAPEUTIC EXERCISES: CPT

## 2018-07-24 NOTE — PROGRESS NOTES
Diagnosis: DDD (degenerative disc disease), lumbar (M51.36)  Acute medial meniscus tear of left knee, initial encounter (R18.151J)  Authorized # of Visits:  8 POC (Medicare)        Next MD visit: none scheduled  Fall Risk: standard         Precautions:

## 2018-07-26 ENCOUNTER — OFFICE VISIT (OUTPATIENT)
Dept: PHYSICAL THERAPY | Facility: HOSPITAL | Age: 58
End: 2018-07-26
Attending: ORTHOPAEDIC SURGERY
Payer: MEDICARE

## 2018-07-26 DIAGNOSIS — M51.36 DDD (DEGENERATIVE DISC DISEASE), LUMBAR: ICD-10-CM

## 2018-07-26 DIAGNOSIS — S83.242A ACUTE MEDIAL MENISCUS TEAR OF LEFT KNEE, INITIAL ENCOUNTER: ICD-10-CM

## 2018-07-26 PROCEDURE — 97110 THERAPEUTIC EXERCISES: CPT

## 2018-07-26 NOTE — PROGRESS NOTES
Diagnosis: DDD (degenerative disc disease), lumbar (M51.36)  Acute medial meniscus tear of left knee, initial encounter (J68.982Q)  Authorized # of Visits:  8 POC (Medicare)        Next MD visit: 8/10/18  Fall Risk: standard         Precautions:         Me

## 2018-07-31 ENCOUNTER — OFFICE VISIT (OUTPATIENT)
Dept: PHYSICAL THERAPY | Facility: HOSPITAL | Age: 58
End: 2018-07-31
Attending: ORTHOPAEDIC SURGERY
Payer: MEDICARE

## 2018-07-31 DIAGNOSIS — S83.242A ACUTE MEDIAL MENISCUS TEAR OF LEFT KNEE, INITIAL ENCOUNTER: ICD-10-CM

## 2018-07-31 DIAGNOSIS — M51.36 DDD (DEGENERATIVE DISC DISEASE), LUMBAR: ICD-10-CM

## 2018-07-31 PROCEDURE — 97110 THERAPEUTIC EXERCISES: CPT

## 2018-07-31 NOTE — PROGRESS NOTES
Diagnosis: DDD (degenerative disc disease), lumbar (M51.36)  Acute medial meniscus tear of left knee, initial encounter (B88.807Y)  Authorized # of Visits:  8 POC (Medicare)        Next MD visit: 8/10/18  Fall Risk: standard         Precautions:         Me gentle strengthening exercises to avoid flaring up fibromyalgia. Goals:   1. Pt will be I with HEP to improve therapy outcome.   2. Pt will be able demonstrate R LE strength >4+/5 for improved sit to stand and stair negotiation with less left knee pa

## 2018-08-02 ENCOUNTER — OFFICE VISIT (OUTPATIENT)
Dept: PHYSICAL THERAPY | Facility: HOSPITAL | Age: 58
End: 2018-08-02
Attending: ORTHOPAEDIC SURGERY
Payer: MEDICARE

## 2018-08-02 DIAGNOSIS — S83.242A ACUTE MEDIAL MENISCUS TEAR OF LEFT KNEE, INITIAL ENCOUNTER: ICD-10-CM

## 2018-08-02 DIAGNOSIS — M51.36 DDD (DEGENERATIVE DISC DISEASE), LUMBAR: ICD-10-CM

## 2018-08-02 PROCEDURE — 97110 THERAPEUTIC EXERCISES: CPT

## 2018-08-02 NOTE — PROGRESS NOTES
Diagnosis: DDD (degenerative disc disease), lumbar (M51.36)  Acute medial meniscus tear of left knee, initial encounter (V79.723P)  Authorized # of Visits:  8 POC (Medicare)        Next MD visit: 8/10/18  Fall Risk: standard         Precautions:         Me

## 2018-08-06 ENCOUNTER — HOSPITAL ENCOUNTER (OUTPATIENT)
Age: 58
Discharge: HOME | End: 2018-08-06
Attending: EMERGENCY MEDICINE
Payer: MEDICARE

## 2018-08-06 VITALS
RESPIRATION RATE: 16 BRPM | HEART RATE: 64 BPM | SYSTOLIC BLOOD PRESSURE: 118 MMHG | DIASTOLIC BLOOD PRESSURE: 69 MMHG | BODY MASS INDEX: 34 KG/M2 | WEIGHT: 185 LBS | TEMPERATURE: 98 F

## 2018-08-06 DIAGNOSIS — L23.7 ALLERGIC DERMATITIS DUE TO RHUS TOXICODENDRON: Primary | ICD-10-CM

## 2018-08-06 PROCEDURE — 99213 OFFICE O/P EST LOW 20 MIN: CPT

## 2018-08-06 PROCEDURE — 99214 OFFICE O/P EST MOD 30 MIN: CPT

## 2018-08-06 NOTE — ED PROVIDER NOTES
Patient Seen in: [facility] In [address] [place] [address]    History   Patient presents with:  Rash Skin Problem (integumentary)    Stated Complaint: rash    HPI    The patient is a 60-year-old female with history of fibromyalgia and osteoarthritis p Physical Exam    Alert female no acute distress  Skin:  Right upper extremity: Multiple vesicles with some papules with crusting scattered in the right upper extremity  Bilateral lower extremities:  Scattered vesicles, some in linear distribution o

## 2018-08-06 NOTE — ED INITIAL ASSESSMENT (HPI)
Worked outside in the garden Wednesday Thursday broke out in rash blister and welts on rt arm legs, used neosporin w/o relief.

## 2018-08-06 NOTE — ED NOTES
Wear protective clothing while gardening long sleeve shirts and pants.  meds aveeno as directed.  Call and follow up with your pcp in office

## 2018-08-07 ENCOUNTER — APPOINTMENT (OUTPATIENT)
Dept: PHYSICAL THERAPY | Facility: HOSPITAL | Age: 58
End: 2018-08-07
Attending: ORTHOPAEDIC SURGERY
Payer: MEDICARE

## 2018-08-09 ENCOUNTER — OFFICE VISIT (OUTPATIENT)
Dept: PHYSICAL THERAPY | Facility: HOSPITAL | Age: 58
End: 2018-08-09
Attending: ORTHOPAEDIC SURGERY
Payer: MEDICARE

## 2018-08-09 DIAGNOSIS — S83.242A ACUTE MEDIAL MENISCUS TEAR OF LEFT KNEE, INITIAL ENCOUNTER: ICD-10-CM

## 2018-08-09 DIAGNOSIS — M51.36 DDD (DEGENERATIVE DISC DISEASE), LUMBAR: ICD-10-CM

## 2018-08-09 PROCEDURE — 97110 THERAPEUTIC EXERCISES: CPT

## 2018-08-09 NOTE — PROGRESS NOTES
Diagnosis: DDD (degenerative disc disease), lumbar (M51.36)  Acute medial meniscus tear of left knee, initial encounter (C69.638I)  Authorized # of Visits:  8 POC (Medicare)        Next MD visit: 8/10/18  Fall Risk: standard         Precautions:         Me for improved sit to stand and stair negotiation with less left knee pain and LBP.- NOT MET, pt states left knee pain due and feels it can't support her weight  3. Pt will be able to ambulate with no AD community distances without LOB.  -NOT MET, pt does not

## 2018-08-10 ENCOUNTER — OFFICE VISIT (OUTPATIENT)
Dept: ORTHOPEDICS CLINIC | Facility: CLINIC | Age: 58
End: 2018-08-10
Payer: MEDICARE

## 2018-08-10 DIAGNOSIS — M51.36 DDD (DEGENERATIVE DISC DISEASE), LUMBAR: Primary | ICD-10-CM

## 2018-08-10 DIAGNOSIS — M25.562 ACUTE PAIN OF LEFT KNEE: ICD-10-CM

## 2018-08-10 PROCEDURE — G0463 HOSPITAL OUTPT CLINIC VISIT: HCPCS | Performed by: ORTHOPAEDIC SURGERY

## 2018-08-10 PROCEDURE — 99213 OFFICE O/P EST LOW 20 MIN: CPT | Performed by: ORTHOPAEDIC SURGERY

## 2018-08-10 NOTE — PROGRESS NOTES
This is a pleasant 63-year-old female with a 6.5 months status post left knee arthroscopy partial medial and lateral meniscectomy. Patient continues to have pain over the anterior aspect of the left knee.   She reports that her left quadriceps muscle  is s

## 2018-08-14 ENCOUNTER — APPOINTMENT (OUTPATIENT)
Dept: PHYSICAL THERAPY | Facility: HOSPITAL | Age: 58
End: 2018-08-14
Attending: ORTHOPAEDIC SURGERY
Payer: MEDICARE

## 2018-08-15 ENCOUNTER — OFFICE VISIT (OUTPATIENT)
Dept: FAMILY MEDICINE CLINIC | Facility: CLINIC | Age: 58
End: 2018-08-15
Payer: MEDICARE

## 2018-08-15 VITALS
TEMPERATURE: 98 F | DIASTOLIC BLOOD PRESSURE: 68 MMHG | HEART RATE: 60 BPM | WEIGHT: 184 LBS | HEIGHT: 61.5 IN | SYSTOLIC BLOOD PRESSURE: 120 MMHG | BODY MASS INDEX: 34.29 KG/M2 | RESPIRATION RATE: 12 BRPM | OXYGEN SATURATION: 98 %

## 2018-08-15 DIAGNOSIS — L23.7 POISON IVY DERMATITIS: Primary | ICD-10-CM

## 2018-08-15 PROCEDURE — 99213 OFFICE O/P EST LOW 20 MIN: CPT

## 2018-08-16 ENCOUNTER — HOSPITAL ENCOUNTER (OUTPATIENT)
Dept: MRI IMAGING | Age: 58
Discharge: HOME | End: 2018-08-16
Attending: ORTHOPAEDIC SURGERY
Payer: MEDICARE

## 2018-08-16 ENCOUNTER — APPOINTMENT (OUTPATIENT)
Dept: PHYSICAL THERAPY | Facility: HOSPITAL | Age: 58
End: 2018-08-16
Attending: ORTHOPAEDIC SURGERY
Payer: MEDICARE

## 2018-08-16 DIAGNOSIS — M25.562 ACUTE PAIN OF LEFT KNEE: ICD-10-CM

## 2018-08-16 PROCEDURE — 73721 MRI JNT OF LWR EXTRE W/O DYE: CPT | Performed by: ORTHOPAEDIC SURGERY

## 2018-08-16 NOTE — PROGRESS NOTES
HPI:    Patient ID: Christine Barrios is a 62year old female. Rash   This is a new problem. Episode onset: 1 week ago. The problem has been gradually improving since onset.  The affected locations include the right arm, right lower leg, left lower leg and l Bottle Rfl: 0   Pantoprazole Sodium 40 MG Oral Tab EC Take 1 tablet (40 mg total) by mouth every morning before breakfast. Disp: 90 tablet Rfl: 3   Pravastatin Sodium 20 MG Oral Tab Take 1 tablet (20 mg total) by mouth nightly.  Disp: 90 tablet Rfl: 3   PAR

## 2018-08-20 ENCOUNTER — TELEPHONE (OUTPATIENT)
Dept: FAMILY MEDICINE CLINIC | Facility: CLINIC | Age: 58
End: 2018-08-20

## 2018-08-20 DIAGNOSIS — L23.7 POISON IVY DERMATITIS: ICD-10-CM

## 2018-08-20 NOTE — TELEPHONE ENCOUNTER
Patient is calling stating she went to  prescription for Triamcinolone Acetonide cream and she stating pharmacy told her there was nothing there for her. Can we resend prescription the the pharmacy.

## 2018-08-21 ENCOUNTER — OFFICE VISIT (OUTPATIENT)
Dept: ORTHOPEDICS CLINIC | Facility: CLINIC | Age: 58
End: 2018-08-21
Payer: MEDICARE

## 2018-08-21 DIAGNOSIS — M51.36 DDD (DEGENERATIVE DISC DISEASE), LUMBAR: Primary | ICD-10-CM

## 2018-08-21 DIAGNOSIS — M25.562 ACUTE PAIN OF LEFT KNEE: ICD-10-CM

## 2018-08-21 PROCEDURE — G0463 HOSPITAL OUTPT CLINIC VISIT: HCPCS | Performed by: ORTHOPAEDIC SURGERY

## 2018-08-21 PROCEDURE — 99213 OFFICE O/P EST LOW 20 MIN: CPT | Performed by: ORTHOPAEDIC SURGERY

## 2018-08-21 NOTE — PROGRESS NOTES
This is a pleasant 26-year-old female that is 7 months status post left knee arthroscopy and partial medial and lateral meniscectomy. Patient continues to have pain over the anterior medial aspect of the left knee.   The patient recently had an MRI of the

## 2018-09-17 ENCOUNTER — HOSPITAL ENCOUNTER (EMERGENCY)
Facility: HOSPITAL | Age: 58
Discharge: HOME | End: 2018-09-17
Attending: EMERGENCY MEDICINE
Payer: MEDICARE

## 2018-09-17 VITALS
HEIGHT: 63 IN | HEART RATE: 108 BPM | DIASTOLIC BLOOD PRESSURE: 98 MMHG | OXYGEN SATURATION: 96 % | SYSTOLIC BLOOD PRESSURE: 128 MMHG | BODY MASS INDEX: 31.89 KG/M2 | WEIGHT: 180 LBS | RESPIRATION RATE: 18 BRPM | TEMPERATURE: 102 F

## 2018-09-17 DIAGNOSIS — K52.9 GASTROENTERITIS: Primary | ICD-10-CM

## 2018-09-17 LAB
ANION GAP SERPL CALC-SCNC: 9 MMOL/L (ref 0–18)
BASOPHILS # BLD: 0 K/UL (ref 0–0.2)
BASOPHILS NFR BLD: 0 %
BILIRUB UR QL: NEGATIVE
BUN SERPL-MCNC: 17 MG/DL (ref 8–20)
BUN/CREAT SERPL: 21 (ref 10–20)
CALCIUM SERPL-MCNC: 8.9 MG/DL (ref 8.5–10.5)
CHLORIDE SERPL-SCNC: 105 MMOL/L (ref 95–110)
CLARITY UR: CLEAR
CO2 SERPL-SCNC: 25 MMOL/L (ref 22–32)
COLOR UR: YELLOW
CREAT SERPL-MCNC: 0.81 MG/DL (ref 0.5–1.5)
EOSINOPHIL # BLD: 0.1 K/UL (ref 0–0.7)
EOSINOPHIL NFR BLD: 0 %
ERYTHROCYTE [DISTWIDTH] IN BLOOD BY AUTOMATED COUNT: 14.2 % (ref 11–15)
GLUCOSE SERPL-MCNC: 138 MG/DL (ref 70–99)
GLUCOSE UR-MCNC: NEGATIVE MG/DL
HCT VFR BLD AUTO: 44.4 % (ref 35–48)
HGB BLD-MCNC: 14.2 G/DL (ref 12–16)
HGB UR QL STRIP.AUTO: NEGATIVE
KETONES UR-MCNC: NEGATIVE MG/DL
LEUKOCYTE ESTERASE UR QL STRIP.AUTO: NEGATIVE
LYMPHOCYTES # BLD: 0.9 K/UL (ref 1–4)
LYMPHOCYTES NFR BLD: 5 %
MCH RBC QN AUTO: 28.1 PG (ref 27–32)
MCHC RBC AUTO-ENTMCNC: 32.1 G/DL (ref 32–37)
MCV RBC AUTO: 87.8 FL (ref 80–100)
MONOCYTES # BLD: 1.4 K/UL (ref 0–1)
MONOCYTES NFR BLD: 8 %
NEUTROPHILS # BLD AUTO: 16.4 K/UL (ref 1.8–7.7)
NEUTROPHILS NFR BLD: 87 %
NITRITE UR QL STRIP.AUTO: NEGATIVE
OSMOLALITY UR CALC.SUM OF ELEC: 292 MOSM/KG (ref 275–295)
PH UR: 5 [PH] (ref 5–8)
PLATELET # BLD AUTO: 308 K/UL (ref 140–400)
PMV BLD AUTO: 8.8 FL (ref 7.4–10.3)
POTASSIUM SERPL-SCNC: 3.9 MMOL/L (ref 3.3–5.1)
PROT UR-MCNC: NEGATIVE MG/DL
RBC # BLD AUTO: 5.06 M/UL (ref 3.7–5.4)
SODIUM SERPL-SCNC: 139 MMOL/L (ref 136–144)
SP GR UR STRIP: 1.02 (ref 1–1.03)
UROBILINOGEN UR STRIP-ACNC: <2
VIT C UR-MCNC: NEGATIVE MG/DL
WBC # BLD AUTO: 18.9 K/UL (ref 4–11)

## 2018-09-17 PROCEDURE — 99285 EMERGENCY DEPT VISIT HI MDM: CPT

## 2018-09-17 PROCEDURE — 80048 BASIC METABOLIC PNL TOTAL CA: CPT | Performed by: EMERGENCY MEDICINE

## 2018-09-17 PROCEDURE — 81003 URINALYSIS AUTO W/O SCOPE: CPT | Performed by: EMERGENCY MEDICINE

## 2018-09-17 PROCEDURE — 81003 URINALYSIS AUTO W/O SCOPE: CPT

## 2018-09-17 PROCEDURE — 85025 COMPLETE CBC W/AUTO DIFF WBC: CPT

## 2018-09-17 PROCEDURE — 96374 THER/PROPH/DIAG INJ IV PUSH: CPT

## 2018-09-17 PROCEDURE — 96361 HYDRATE IV INFUSION ADD-ON: CPT

## 2018-09-17 PROCEDURE — 85025 COMPLETE CBC W/AUTO DIFF WBC: CPT | Performed by: EMERGENCY MEDICINE

## 2018-09-17 PROCEDURE — 80048 BASIC METABOLIC PNL TOTAL CA: CPT

## 2018-09-17 RX ORDER — ONDANSETRON 2 MG/ML
4 INJECTION INTRAMUSCULAR; INTRAVENOUS ONCE
Status: COMPLETED | OUTPATIENT
Start: 2018-09-17 | End: 2018-09-17

## 2018-09-17 RX ORDER — ONDANSETRON 2 MG/ML
INJECTION INTRAMUSCULAR; INTRAVENOUS
Status: COMPLETED
Start: 2018-09-17 | End: 2018-09-17

## 2018-09-17 RX ORDER — ACETAMINOPHEN 325 MG/1
650 TABLET ORAL ONCE
Status: COMPLETED | OUTPATIENT
Start: 2018-09-17 | End: 2018-09-17

## 2018-09-17 RX ORDER — CIPROFLOXACIN 500 MG/1
500 TABLET, FILM COATED ORAL 2 TIMES DAILY
Qty: 10 TABLET | Refills: 0 | Status: SHIPPED | OUTPATIENT
Start: 2018-09-17 | End: 2018-09-22

## 2018-09-17 RX ORDER — CIPROFLOXACIN 500 MG/1
500 TABLET, FILM COATED ORAL ONCE
Status: COMPLETED | OUTPATIENT
Start: 2018-09-17 | End: 2018-09-17

## 2018-09-17 NOTE — ED PROVIDER NOTES
Patient Seen in: [facility] Emergency Department    History   Patient presents with:  Abdomen/Flank Pain (GI/)    Stated Complaint: Abdominal Pain; chills    HPI    Patient is a 51-year-old female who presents to the emergency department with a Head: Normocephalic and atraumatic. Eyes: Conjunctivae and EOM are normal. Pupils are equal, round, and reactive to light. Neck: Neck supple. Cardiovascular: Normal rate, regular rhythm and normal heart sounds.    Pulmonary/Chest: Effort normal.   Abd Patient given IV fluids in the emergency department. White count noted elevated. Strongly suspect infectious gastroenteritis. Will discharge home with prescription for Cipro for 5 days.             Disposition and Plan     Clinical Impression:  Jerad Armijo

## 2018-09-17 NOTE — ED INITIAL ASSESSMENT (HPI)
C/o abd pain and n/v since this antemeridian, states she vomited only once and it was \"super yellow and sour\"

## 2018-10-02 ENCOUNTER — OFFICE VISIT (OUTPATIENT)
Dept: NEUROLOGY | Facility: CLINIC | Age: 58
End: 2018-10-02
Payer: MEDICARE

## 2018-10-02 ENCOUNTER — TELEPHONE (OUTPATIENT)
Dept: NEUROLOGY | Facility: CLINIC | Age: 58
End: 2018-10-02

## 2018-10-02 VITALS
HEIGHT: 62 IN | WEIGHT: 175 LBS | BODY MASS INDEX: 32.2 KG/M2 | RESPIRATION RATE: 16 BRPM | DIASTOLIC BLOOD PRESSURE: 68 MMHG | HEART RATE: 80 BPM | SYSTOLIC BLOOD PRESSURE: 130 MMHG

## 2018-10-02 DIAGNOSIS — R20.0 NUMBNESS IN LEFT LEG: Primary | ICD-10-CM

## 2018-10-02 PROCEDURE — 99204 OFFICE O/P NEW MOD 45 MIN: CPT | Performed by: PHYSICAL MEDICINE & REHABILITATION

## 2018-10-02 NOTE — TELEPHONE ENCOUNTER
Pt. informed insurance was verified and procedure MRI L-spine  is a covered benefit and does not require authorization. .  Can proceed with scheduling appt.

## 2018-10-02 NOTE — H&P
[place] [address], [place] [address], [address], [place]    History and Physical    Cleatus Deven Patient Status:  No patient class for patient encounter    1960 MRN [id number]   Location [place] [address], [place] [address], medial and lateral meniscectomies  1/24/2018: KNEE ARTHROSCOPY;  Left      Comment:  Performed by Isrrael Mendoza MD at [address] [place] [address] MAIN OR  Family History   Problem Relation Age of Onset   • Hypertension Father    • Heart Disease Father    • Hypertension Moth paravertebral musculature, spinous processes, or SI joints bilaterally. No tenderness at the left medial and lateral joint lines. Provocative tests: Left SLR + at 30 degrees for left knee and lower back pain; improves when the knee is bent.   Negative se anti-inflammatory, dedicated physical therapy for lumbar stabilization or surgery.  If there is no obvious lumbar pathology consider EMG or anti-inflammatory; patient can then pursue viscosupplementation through Dr. Ashley Morrow if MRI unrevealing and EMG not in

## 2018-10-04 DIAGNOSIS — M79.7 FIBROMYALGIA: ICD-10-CM

## 2018-10-04 RX ORDER — GABAPENTIN 300 MG/1
300 CAPSULE ORAL NIGHTLY
Qty: 90 CAPSULE | Refills: 1 | Status: SHIPPED | OUTPATIENT
Start: 2018-10-04 | End: 2019-03-25

## 2018-10-04 NOTE — TELEPHONE ENCOUNTER
LOV: 4/9/18  Last Refilled:#90, 1rf 4/9/18      Future Appointments   Date Time Provider Dawn Arias   10/5/2018 10:30 AM [facility] RM2 3T [facility]  [address]   10/8/2018 10:00 AM Obdulio Stafford MD [address] [place] [address]   10/16/2018  9:15 AM Alina Ocampo

## 2018-10-05 ENCOUNTER — HOSPITAL ENCOUNTER (OUTPATIENT)
Dept: MRI IMAGING | Facility: HOSPITAL | Age: 58
Discharge: HOME | End: 2018-10-05
Attending: PHYSICAL MEDICINE & REHABILITATION
Payer: MEDICARE

## 2018-10-05 DIAGNOSIS — R20.0 NUMBNESS IN LEFT LEG: ICD-10-CM

## 2018-10-05 PROCEDURE — 72148 MRI LUMBAR SPINE W/O DYE: CPT | Performed by: PHYSICAL MEDICINE & REHABILITATION

## 2018-10-07 DIAGNOSIS — I10 ESSENTIAL HYPERTENSION: ICD-10-CM

## 2018-10-08 ENCOUNTER — OFFICE VISIT (OUTPATIENT)
Dept: RHEUMATOLOGY | Facility: CLINIC | Age: 58
End: 2018-10-08
Payer: MEDICARE

## 2018-10-08 VITALS
SYSTOLIC BLOOD PRESSURE: 122 MMHG | DIASTOLIC BLOOD PRESSURE: 69 MMHG | WEIGHT: 184 LBS | BODY MASS INDEX: 33.86 KG/M2 | HEART RATE: 52 BPM | HEIGHT: 62 IN

## 2018-10-08 DIAGNOSIS — M79.7 FIBROMYALGIA: Primary | ICD-10-CM

## 2018-10-08 DIAGNOSIS — M79.645 PAIN OF LEFT THUMB: ICD-10-CM

## 2018-10-08 PROCEDURE — 99214 OFFICE O/P EST MOD 30 MIN: CPT | Performed by: INTERNAL MEDICINE

## 2018-10-08 PROCEDURE — G0463 HOSPITAL OUTPT CLINIC VISIT: HCPCS | Performed by: INTERNAL MEDICINE

## 2018-10-08 RX ORDER — METOPROLOL SUCCINATE 25 MG/1
TABLET, EXTENDED RELEASE ORAL
Qty: 45 TABLET | Refills: 0 | OUTPATIENT
Start: 2018-10-08

## 2018-10-08 NOTE — PATIENT INSTRUCTIONS
1. Cont. Gabapentin 300mg at night   2. Diclofenac 1 % gel - topical use   3. Follow up with in 6 months.

## 2018-10-08 NOTE — PROGRESS NOTES
Ирина Joseph is a 62year old female who presents for Patient presents with:  Osteoarthritis  Finger Pain: left thumb pain  . HPI:     I had the pleasure of seeing Ирина Joseph on 7/6/2017 for evaluation.      She is a pleasant 64year old who has hx of ok with the gabpentin 300mg at night. She tapered from 400mg b/c it made her too dizzy. She has no drowsiness or dizziness. 10/8/2018  Her pain is 5/10 pain. She is doing well on gabpnetin 300mg at night. She feels 9/10 pain in her left thumb.  She' • Visual impairment       Past Surgical History:  2006: CARPAL TUNNEL RELEASE; Right  10/14/2014: CARPAL TUNNEL RELEASE; Left  01/24/2018: KNEE ARTHROSCOPY; Left      Comment:  partial medial and lateral meniscectomies  1/24/2018: KNEE ARTHROSCOPY;  Left depression  ENDO: no hx of thyroid disease, no hx of DM  She has no trouble sleeping now but sheis napping now.,   Joint/Muscluskeltal: see HPI,   All other ROS are negative.      EXAM:   /69 (BP Location: Right arm, Patient Position: Sitting, Cuff Si 16.0 g/dL 14.2   Hematocrit      35.0 - 48.0 % 44.4   MCV      80.0 - 100.0 fL 87.8   MCH      27.0 - 32.0 pg 28.1   MCHC      32.0 - 37.0 g/dl 32.1   RDW      11.0 - 15.0 % 14.2   Platelet Count      790 - 400 K/   MEAN PLATELET VOLUME      7.4 - 10 Tricompartmental chondral fissuring. Anterior prepatellar soft tissue swelling is nonspecific but can be seen with bursitis.     6/6/2017 - bilat knee xray - normal  ASSESSMENT AND PLAN:   Shelby Maurice is a 62year old female who presents for Patient

## 2018-10-09 ENCOUNTER — TELEPHONE (OUTPATIENT)
Dept: NEUROLOGY | Facility: CLINIC | Age: 58
End: 2018-10-09

## 2018-10-09 NOTE — TELEPHONE ENCOUNTER
Spoke with pt per Dr Shamar Renee mild arthritis in the lower back, but the space for the nerves is open, so doctor don't think that the pain she is experiencing is coming from her back.  She should continue the diclofenac gel prescribed by Dr. Jaimee Oneal yeste

## 2018-10-09 NOTE — TELEPHONE ENCOUNTER
Called patient. She is Libyan speaking. Explained that I would ask someone Libyan speaking to call her back.

## 2018-10-09 NOTE — TELEPHONE ENCOUNTER
----- Message from Destini Olivarez DO sent at 10/9/2018 10:45 AM CDT -----  Please inform the patient that she has mild arthritis in the lower back, but the space for the nerves is open, so I don't think that the pain she is experiencing is coming from her

## 2018-10-12 ENCOUNTER — TELEPHONE (OUTPATIENT)
Dept: RHEUMATOLOGY | Facility: CLINIC | Age: 58
End: 2018-10-12

## 2018-10-12 NOTE — TELEPHONE ENCOUNTER
Approvedtoday  Request Reference Number: [id number]. DICLOFENAC GEL 1% is approved through 12/31/2019. For further questions, call [phone]. Pharmacy contacted and will call pt when script is ready.

## 2018-10-15 DIAGNOSIS — I10 ESSENTIAL HYPERTENSION: ICD-10-CM

## 2018-10-15 RX ORDER — METOPROLOL SUCCINATE 25 MG/1
TABLET, EXTENDED RELEASE ORAL
Qty: 45 TABLET | Refills: 0 | Status: CANCELLED | OUTPATIENT
Start: 2018-10-15

## 2018-10-16 ENCOUNTER — OFFICE VISIT (OUTPATIENT)
Dept: NEUROLOGY | Facility: CLINIC | Age: 58
End: 2018-10-16
Payer: MEDICARE

## 2018-10-16 ENCOUNTER — OFFICE VISIT (OUTPATIENT)
Dept: FAMILY MEDICINE CLINIC | Facility: CLINIC | Age: 58
End: 2018-10-16
Payer: MEDICARE

## 2018-10-16 VITALS
WEIGHT: 182 LBS | HEART RATE: 65 BPM | SYSTOLIC BLOOD PRESSURE: 134 MMHG | BODY MASS INDEX: 33 KG/M2 | DIASTOLIC BLOOD PRESSURE: 66 MMHG | OXYGEN SATURATION: 96 %

## 2018-10-16 VITALS
BODY MASS INDEX: 32.76 KG/M2 | WEIGHT: 178 LBS | SYSTOLIC BLOOD PRESSURE: 118 MMHG | HEART RATE: 80 BPM | DIASTOLIC BLOOD PRESSURE: 56 MMHG | RESPIRATION RATE: 16 BRPM | HEIGHT: 62 IN

## 2018-10-16 DIAGNOSIS — M25.562 CHRONIC PAIN OF LEFT KNEE: ICD-10-CM

## 2018-10-16 DIAGNOSIS — G89.29 CHRONIC PAIN OF LEFT KNEE: ICD-10-CM

## 2018-10-16 DIAGNOSIS — I10 ESSENTIAL HYPERTENSION: Primary | ICD-10-CM

## 2018-10-16 DIAGNOSIS — Z28.21 INFLUENZA VACCINATION DECLINED BY PATIENT: ICD-10-CM

## 2018-10-16 DIAGNOSIS — M25.561 ACUTE PAIN OF RIGHT KNEE: Primary | ICD-10-CM

## 2018-10-16 PROBLEM — L23.7 POISON IVY DERMATITIS: Status: RESOLVED | Noted: 2018-08-15 | Resolved: 2018-10-16

## 2018-10-16 PROCEDURE — 99213 OFFICE O/P EST LOW 20 MIN: CPT

## 2018-10-16 PROCEDURE — 99213 OFFICE O/P EST LOW 20 MIN: CPT | Performed by: PHYSICAL MEDICINE & REHABILITATION

## 2018-10-16 RX ORDER — METOPROLOL SUCCINATE 25 MG/1
12.5 TABLET, EXTENDED RELEASE ORAL DAILY
Qty: 45 TABLET | Refills: 0 | Status: SHIPPED | OUTPATIENT
Start: 2018-10-16 | End: 2019-01-15

## 2018-10-16 NOTE — PATIENT INSTRUCTIONS
Call the clinic in 2 weeks to set up EMG of the left lower extremity to check for nerve damage if you continue to have left knee pain despite using the diclofenac gel.

## 2018-10-16 NOTE — PROGRESS NOTES
HPI:    Patient ID: Ирина Joseph is a 62year old female. Hypertension   This is a chronic problem. Episode onset: few years ago. The problem has been waxing and waning since onset. The problem is controlled.  Pertinent negatives include no anxiety, pa NIGHTLY. Disp: 90 capsule Rfl: 1   Pantoprazole Sodium 40 MG Oral Tab EC Take 1 tablet (40 mg total) by mouth every morning before breakfast. Disp: 90 tablet Rfl: 3   Pravastatin Sodium 20 MG Oral Tab Take 1 tablet (20 mg total) by mouth nightly.  Disp: 90

## 2018-10-16 NOTE — PROGRESS NOTES
HPI:    Patient ID: Aime Rodrigues is a 62year old female. She presents for follow-up to review results of MRI.   She continues to have left anterior medial knee pain worse with walking with occasional sensation that the left knee is going to give out on Constitutional: She appears well-developed and well-nourished. HENT:   Head: Normocephalic and atraumatic.    Eyes: Conjunctivae and EOM are normal.   Musculoskeletal:   Lumbar range of motion: No pain with flexion or extension    Provocative tests: Neg foraminal stenosis.     L5-S1:   Disk desiccation with bulging disk, facet, and ligamentous hypertrophy results in mild central canal narrowing and mild bilateral neural foraminal narrowing.     =====  CONCLUSION:      Mild degenerative disc and facet disea

## 2018-12-10 NOTE — TELEPHONE ENCOUNTER
LOV: 10/18/18  Future Appointments   Date Time Provider Dawn Arias   12/26/2018  1:00 PM Charlie Ramires [facility]   1/15/2019  9:30 AM Zuhair Coleman MD [facility]   4/16/2019 10:00 AM Sarah Brown MD [address] [place] [address]

## 2018-12-26 ENCOUNTER — TELEPHONE (OUTPATIENT)
Dept: NEUROLOGY | Facility: CLINIC | Age: 58
End: 2018-12-26

## 2018-12-26 ENCOUNTER — PROCEDURE VISIT (OUTPATIENT)
Dept: NEUROLOGY | Facility: CLINIC | Age: 58
End: 2018-12-26
Payer: MEDICARE

## 2018-12-26 DIAGNOSIS — M79.605 LEFT LEG PAIN: ICD-10-CM

## 2018-12-26 DIAGNOSIS — M25.562 CHRONIC PAIN OF LEFT KNEE: Primary | ICD-10-CM

## 2018-12-26 DIAGNOSIS — G89.29 CHRONIC PAIN OF LEFT KNEE: Primary | ICD-10-CM

## 2018-12-26 PROCEDURE — 95886 MUSC TEST DONE W/N TEST COMP: CPT | Performed by: PHYSICAL MEDICINE & REHABILITATION

## 2018-12-26 PROCEDURE — 95908 NRV CNDJ TST 3-4 STUDIES: CPT | Performed by: PHYSICAL MEDICINE & REHABILITATION

## 2018-12-26 NOTE — PROGRESS NOTES
Results discussed with patient, specifically no lumbosacral radiculopathy seen; wants to seek second opinion for medial left knee pain with another orthopedic surgeon. May follow up PRN.     Álvaro Ortiz DO  Physical Medicine and Rehabilitation  [facility]

## 2018-12-26 NOTE — PROCEDURES
[address]  Phone: [phone]  Fax: [phone] REPORT          Patient: Kim Tapia Hand Dominance: right handed  Patient ID: [id number] Referring Dr: Dr. Santa Morrison L Tibial - AH      Ankle AH 3.28 14.4 5.94 Ankle - AH 8        Knee AH 10.05 11.4 6.56 Knee - Ankle 30 6.77 44       Sensory NCS      Nerve / Sites Rec.  Site Onset Lat Peak Lat NP Amp PP Amp Segments Distance Velocity     ms ms µV µV  cm m/s   L Sural - La

## 2019-01-11 DIAGNOSIS — I10 ESSENTIAL HYPERTENSION: ICD-10-CM

## 2019-01-11 RX ORDER — METOPROLOL SUCCINATE 25 MG/1
12.5 TABLET, EXTENDED RELEASE ORAL DAILY
Qty: 45 TABLET | Refills: 0 | OUTPATIENT
Start: 2019-01-11

## 2019-01-15 ENCOUNTER — OFFICE VISIT (OUTPATIENT)
Dept: FAMILY MEDICINE CLINIC | Facility: CLINIC | Age: 59
End: 2019-01-15
Payer: COMMERCIAL

## 2019-01-15 VITALS
HEART RATE: 64 BPM | BODY MASS INDEX: 34.23 KG/M2 | WEIGHT: 186 LBS | OXYGEN SATURATION: 96 % | DIASTOLIC BLOOD PRESSURE: 68 MMHG | SYSTOLIC BLOOD PRESSURE: 120 MMHG | HEIGHT: 62 IN

## 2019-01-15 DIAGNOSIS — Z01.89 ENCOUNTER FOR ROUTINE LABORATORY TESTING: ICD-10-CM

## 2019-01-15 DIAGNOSIS — H53.8 BLURRY VISION: ICD-10-CM

## 2019-01-15 DIAGNOSIS — E53.8 VITAMIN B12 DEFICIENCY: ICD-10-CM

## 2019-01-15 DIAGNOSIS — I10 ESSENTIAL HYPERTENSION: Primary | ICD-10-CM

## 2019-01-15 DIAGNOSIS — E78.00 HYPERCHOLESTEREMIA: ICD-10-CM

## 2019-01-15 DIAGNOSIS — Z11.59 NEED FOR HEPATITIS C SCREENING TEST: ICD-10-CM

## 2019-01-15 PROCEDURE — 99214 OFFICE O/P EST MOD 30 MIN: CPT

## 2019-01-15 RX ORDER — METOPROLOL SUCCINATE 25 MG/1
12.5 TABLET, EXTENDED RELEASE ORAL DAILY
Qty: 45 TABLET | Refills: 0 | Status: SHIPPED | OUTPATIENT
Start: 2019-01-15 | End: 2019-04-20

## 2019-01-15 RX ORDER — OLOPATADINE HYDROCHLORIDE 1 MG/ML
SOLUTION/ DROPS OPHTHALMIC
COMMUNITY
Start: 2019-01-14 | End: 2019-01-14

## 2019-01-16 DIAGNOSIS — I10 ESSENTIAL HYPERTENSION: ICD-10-CM

## 2019-01-16 PROBLEM — E53.8 VITAMIN B12 DEFICIENCY: Status: ACTIVE | Noted: 2019-01-16

## 2019-01-16 RX ORDER — METOPROLOL SUCCINATE 25 MG/1
12.5 TABLET, EXTENDED RELEASE ORAL DAILY
Qty: 45 TABLET | Refills: 0 | OUTPATIENT
Start: 2019-01-16

## 2019-01-17 NOTE — PATIENT INSTRUCTIONS
Visión Borrosa [Blurred Vision]     La visión borrosa consiste en la pérdida de [place] visual y la incapacidad de rose las cosas en detalle. Cualquier tipo de cambio en la visión, nick sea repentino o gradual, debe ser examinado por un oculista.     Las · Un oftalmólogo es un doctor en medicina que se especializa en el cuidado de los ojos así louie en el diagnóstico y [address], y puede recetar medicamentos y realizar operaciones quirúrgicas.  Janie Walters recetar a

## 2019-01-17 NOTE — PROGRESS NOTES
HPI:    Patient ID: Gita Carvalho is a 62year old female. Hypertension   This is a chronic problem. Episode onset: few years ago. The problem has been waxing and waning since onset. The problem is controlled.  Pertinent negatives include no anxiety, pa mouth daily. Disp:  Rfl:    GABAPENTIN 300 MG Oral Cap TAKE 1 CAPSULE (300 MG TOTAL) BY MOUTH NIGHTLY.  Disp: 90 capsule Rfl: 1   Pantoprazole Sodium 40 MG Oral Tab EC Take 1 tablet (40 mg total) by mouth every morning before breakfast. Disp: 90 tablet Rfl: annual physical exam.      Orders Placed This Encounter      CBC, Platelet, No Differential [E]      Comp Metabolic Panel (14) [E]      Lipid Panel [E]      Urinalysis, Routine [E][If pt <2 yrs old, must also order Reducing Substance ([id number])]      Vitami

## 2019-01-22 ENCOUNTER — OFFICE VISIT (OUTPATIENT)
Dept: ORTHOPEDICS CLINIC | Facility: CLINIC | Age: 59
End: 2019-01-22
Payer: COMMERCIAL

## 2019-01-22 ENCOUNTER — HOSPITAL ENCOUNTER (OUTPATIENT)
Dept: GENERAL RADIOLOGY | Facility: HOSPITAL | Age: 59
Discharge: HOME | End: 2019-01-22
Attending: ORTHOPAEDIC SURGERY | Admitting: ORTHOPAEDIC SURGERY
Payer: MEDICARE

## 2019-01-22 VITALS — WEIGHT: 180 LBS | HEIGHT: 63 IN | BODY MASS INDEX: 31.89 KG/M2

## 2019-01-22 DIAGNOSIS — M17.12 PRIMARY OSTEOARTHRITIS OF LEFT KNEE: Primary | ICD-10-CM

## 2019-01-22 DIAGNOSIS — Z47.89 ORTHOPEDIC AFTERCARE: ICD-10-CM

## 2019-01-22 PROCEDURE — 99213 OFFICE O/P EST LOW 20 MIN: CPT | Performed by: ORTHOPAEDIC SURGERY

## 2019-01-22 PROCEDURE — G0463 HOSPITAL OUTPT CLINIC VISIT: HCPCS | Performed by: ORTHOPAEDIC SURGERY

## 2019-01-22 PROCEDURE — 73564 X-RAY EXAM KNEE 4 OR MORE: CPT | Performed by: ORTHOPAEDIC SURGERY

## 2019-01-23 ENCOUNTER — TELEPHONE (OUTPATIENT)
Dept: ORTHOPEDICS CLINIC | Facility: CLINIC | Age: 59
End: 2019-01-23

## 2019-01-23 NOTE — PROGRESS NOTES
This is a pleasant 20-year-old female that is 1 year status post left knee arthroscopy and partial medial lateral meniscectomy. She continues to have pain about the left knee.   She has seen a pain management physician who has concluded she does not have n

## 2019-01-23 NOTE — TELEPHONE ENCOUNTER
[facility] provider services to see if Synvisc requires PA at [phone]. Spoke with [employer]. Provided J code [id number] and Dx code M17.12 along with provider and facility and patient information.  Per representative PA is not

## 2019-02-04 NOTE — TELEPHONE ENCOUNTER
LOV:10-8  Last Filled:12-10, 100g with 3 refills  Labs:   Future Appointments   Date Time Provider Dawn Juana   2/5/2019  5:10 PM Rolando Damian MD [facility]   2/12/2019  4:00 PM [facility]   4/16/2019  9:00 AM

## 2019-03-25 DIAGNOSIS — M79.7 FIBROMYALGIA: ICD-10-CM

## 2019-03-25 DIAGNOSIS — E78.00 HYPERCHOLESTEREMIA: ICD-10-CM

## 2019-03-25 RX ORDER — PRAVASTATIN SODIUM 20 MG
20 TABLET ORAL NIGHTLY
Qty: 90 TABLET | Refills: 3 | OUTPATIENT
Start: 2019-03-25

## 2019-03-25 RX ORDER — GABAPENTIN 300 MG/1
300 CAPSULE ORAL NIGHTLY
Qty: 90 CAPSULE | Refills: 1 | Status: SHIPPED | OUTPATIENT
Start: 2019-03-25 | End: 2019-09-12

## 2019-03-25 NOTE — TELEPHONE ENCOUNTER
LOV:10-8  Last Filled:10-4, #90 with 1 refill  Labs:   Future Appointments   Date Time Provider Dawn Juana   3/29/2019  8:30 AM Mac Mendes MD [facility] THE [facility]   4/16/2019  9:00 AM Zuhair Coleman MD [facility] 8 [facility] OF THE [facility]   4/16/2019 10:00 AM

## 2019-03-29 ENCOUNTER — OFFICE VISIT (OUTPATIENT)
Dept: ORTHOPEDICS CLINIC | Facility: CLINIC | Age: 59
End: 2019-03-29
Payer: COMMERCIAL

## 2019-03-29 VITALS — HEART RATE: 55 BPM | RESPIRATION RATE: 18 BRPM | DIASTOLIC BLOOD PRESSURE: 63 MMHG | SYSTOLIC BLOOD PRESSURE: 132 MMHG

## 2019-03-29 DIAGNOSIS — M17.12 PRIMARY OSTEOARTHRITIS OF LEFT KNEE: Primary | ICD-10-CM

## 2019-03-29 PROCEDURE — 20610 DRAIN/INJ JOINT/BURSA W/O US: CPT | Performed by: ORTHOPAEDIC SURGERY

## 2019-03-29 PROCEDURE — 99213 OFFICE O/P EST LOW 20 MIN: CPT | Performed by: ORTHOPAEDIC SURGERY

## 2019-03-29 NOTE — PROGRESS NOTES
This is a pleasant 12-year-old female that comes in today for repeat evaluation of the left knee. She has previous diagnosis of left knee osteoarthritis that is symptomatic. She comes in today for Synvisc injection.     On exam, the patient has medial lat

## 2019-03-29 NOTE — PROCEDURES
The patient was given an informational brochure about Synvisc. After sterile prep, 5 cc of 1% lidocaine was injected into the left knee. This was followed by 6 cc of Synvisc 1. The patient tolerated the procedure well.

## 2019-03-29 NOTE — PROGRESS NOTES
Per verbal order from Dr. Mei Acharya, draw up 5ml of 1% llidocaine for injection to left knee.   Mercy Trevino

## 2019-04-13 ENCOUNTER — APPOINTMENT (OUTPATIENT)
Dept: LAB | Facility: HOSPITAL | Age: 59
End: 2019-04-13
Payer: MEDICARE

## 2019-04-13 DIAGNOSIS — Z01.89 ENCOUNTER FOR ROUTINE LABORATORY TESTING: ICD-10-CM

## 2019-04-13 DIAGNOSIS — E78.00 HYPERCHOLESTEREMIA: ICD-10-CM

## 2019-04-13 DIAGNOSIS — Z11.59 NEED FOR HEPATITIS C SCREENING TEST: ICD-10-CM

## 2019-04-13 DIAGNOSIS — E53.8 VITAMIN B12 DEFICIENCY: ICD-10-CM

## 2019-04-13 PROCEDURE — 80061 LIPID PANEL: CPT

## 2019-04-13 PROCEDURE — 85027 COMPLETE CBC AUTOMATED: CPT

## 2019-04-13 PROCEDURE — 82607 VITAMIN B-12: CPT

## 2019-04-13 PROCEDURE — 36415 COLL VENOUS BLD VENIPUNCTURE: CPT

## 2019-04-13 PROCEDURE — 81003 URINALYSIS AUTO W/O SCOPE: CPT

## 2019-04-13 PROCEDURE — 86803 HEPATITIS C AB TEST: CPT

## 2019-04-13 PROCEDURE — 80053 COMPREHEN METABOLIC PANEL: CPT

## 2019-04-15 NOTE — TELEPHONE ENCOUNTER
LOV: 10/8/2018  Future Appointments   Date Time Provider Dawn Arias   4/16/2019  9:00 AM Razia Watson MD [facility] [address]   4/16/2019 10:00 AM Nikhil Fermin MD 2014 [facility] [address]   4/23/2019  9:40 AM Kin Schilling MD [facility]

## 2019-04-16 ENCOUNTER — OFFICE VISIT (OUTPATIENT)
Dept: RHEUMATOLOGY | Facility: CLINIC | Age: 59
End: 2019-04-16
Payer: COMMERCIAL

## 2019-04-16 ENCOUNTER — OFFICE VISIT (OUTPATIENT)
Dept: FAMILY MEDICINE CLINIC | Facility: CLINIC | Age: 59
End: 2019-04-16
Payer: COMMERCIAL

## 2019-04-16 VITALS
HEART RATE: 55 BPM | WEIGHT: 184 LBS | DIASTOLIC BLOOD PRESSURE: 64 MMHG | BODY MASS INDEX: 34.74 KG/M2 | SYSTOLIC BLOOD PRESSURE: 118 MMHG | HEIGHT: 61 IN

## 2019-04-16 VITALS
OXYGEN SATURATION: 97 % | HEART RATE: 59 BPM | DIASTOLIC BLOOD PRESSURE: 70 MMHG | BODY MASS INDEX: 35 KG/M2 | HEIGHT: 60.5 IN | WEIGHT: 183 LBS | SYSTOLIC BLOOD PRESSURE: 124 MMHG

## 2019-04-16 DIAGNOSIS — G89.29 CHRONIC PAIN OF BOTH SHOULDERS: ICD-10-CM

## 2019-04-16 DIAGNOSIS — M79.7 FIBROMYALGIA: ICD-10-CM

## 2019-04-16 DIAGNOSIS — H10.13 ALLERGIC CONJUNCTIVITIS OF BOTH EYES: ICD-10-CM

## 2019-04-16 DIAGNOSIS — H02.889 MGD (MEIBOMIAN GLAND DYSFUNCTION): ICD-10-CM

## 2019-04-16 DIAGNOSIS — Z13.31 DEPRESSION SCREENING: ICD-10-CM

## 2019-04-16 DIAGNOSIS — Z12.31 ENCOUNTER FOR SCREENING MAMMOGRAM FOR BREAST CANCER: ICD-10-CM

## 2019-04-16 DIAGNOSIS — K80.20 CALCULUS OF GALLBLADDER WITHOUT CHOLECYSTITIS WITHOUT OBSTRUCTION: ICD-10-CM

## 2019-04-16 DIAGNOSIS — M41.80 DEXTROSCOLIOSIS: ICD-10-CM

## 2019-04-16 DIAGNOSIS — K44.9 HIATAL HERNIA: ICD-10-CM

## 2019-04-16 DIAGNOSIS — M15.9 PRIMARY OSTEOARTHRITIS INVOLVING MULTIPLE JOINTS: ICD-10-CM

## 2019-04-16 DIAGNOSIS — I10 ESSENTIAL HYPERTENSION: ICD-10-CM

## 2019-04-16 DIAGNOSIS — Z00.00 ENCOUNTER FOR MEDICARE ANNUAL WELLNESS EXAM: Primary | ICD-10-CM

## 2019-04-16 DIAGNOSIS — K31.7 GASTRIC POLYPS: ICD-10-CM

## 2019-04-16 DIAGNOSIS — H25.13 AGE-RELATED NUCLEAR CATARACT OF BOTH EYES: ICD-10-CM

## 2019-04-16 DIAGNOSIS — M47.816 SPONDYLOSIS OF LUMBAR REGION WITHOUT MYELOPATHY OR RADICULOPATHY: ICD-10-CM

## 2019-04-16 DIAGNOSIS — Z78.0 POSTMENOPAUSAL: ICD-10-CM

## 2019-04-16 DIAGNOSIS — H35.033 HYPERTENSIVE RETINOPATHY OF BOTH EYES: ICD-10-CM

## 2019-04-16 DIAGNOSIS — Z98.890 S/P MEDIAL MENISCUS REPAIR OF LEFT KNEE: ICD-10-CM

## 2019-04-16 DIAGNOSIS — Z79.82 LONG TERM CURRENT USE OF ASPIRIN: ICD-10-CM

## 2019-04-16 DIAGNOSIS — E66.01 CLASS 2 SEVERE OBESITY DUE TO EXCESS CALORIES WITH SERIOUS COMORBIDITY AND BODY MASS INDEX (BMI) OF 35.0 TO 35.9 IN ADULT (HCC): ICD-10-CM

## 2019-04-16 DIAGNOSIS — K21.9 GASTROESOPHAGEAL REFLUX DISEASE WITHOUT ESOPHAGITIS: ICD-10-CM

## 2019-04-16 DIAGNOSIS — F32.4 MAJOR DEPRESSIVE DISORDER WITH SINGLE EPISODE, IN PARTIAL REMISSION (HCC): ICD-10-CM

## 2019-04-16 DIAGNOSIS — G47.61 PERIODIC LIMB MOVEMENT DISORDER (PLMD): ICD-10-CM

## 2019-04-16 DIAGNOSIS — M79.7 FIBROMYALGIA: Primary | ICD-10-CM

## 2019-04-16 DIAGNOSIS — R74.8 ELEVATED SERUM ALKALINE PHOSPHATASE LEVEL: ICD-10-CM

## 2019-04-16 DIAGNOSIS — Z98.890 S/P LATERAL MENISCUS REPAIR OF LEFT KNEE: ICD-10-CM

## 2019-04-16 DIAGNOSIS — M25.512 CHRONIC PAIN OF BOTH SHOULDERS: ICD-10-CM

## 2019-04-16 DIAGNOSIS — E53.8 VITAMIN B12 DEFICIENCY: ICD-10-CM

## 2019-04-16 DIAGNOSIS — G47.33 OSA (OBSTRUCTIVE SLEEP APNEA): ICD-10-CM

## 2019-04-16 DIAGNOSIS — J84.10 CALCIFIED GRANULOMA OF LUNG (HCC): ICD-10-CM

## 2019-04-16 DIAGNOSIS — K64.8 INTERNAL HEMORRHOIDS: ICD-10-CM

## 2019-04-16 DIAGNOSIS — M25.511 CHRONIC PAIN OF BOTH SHOULDERS: ICD-10-CM

## 2019-04-16 DIAGNOSIS — E78.00 HYPERCHOLESTEREMIA: ICD-10-CM

## 2019-04-16 PROCEDURE — G0439 PPPS, SUBSEQ VISIT: HCPCS

## 2019-04-16 PROCEDURE — 99396 PREV VISIT EST AGE 40-64: CPT

## 2019-04-16 PROCEDURE — 96160 PT-FOCUSED HLTH RISK ASSMT: CPT

## 2019-04-16 PROCEDURE — 99214 OFFICE O/P EST MOD 30 MIN: CPT | Performed by: INTERNAL MEDICINE

## 2019-04-16 PROCEDURE — G0463 HOSPITAL OUTPT CLINIC VISIT: HCPCS | Performed by: INTERNAL MEDICINE

## 2019-04-16 RX ORDER — CYCLOBENZAPRINE HCL 10 MG
10 TABLET ORAL 3 TIMES DAILY PRN
Qty: 15 TABLET | Refills: 0 | Status: SHIPPED | OUTPATIENT
Start: 2019-04-16 | End: 2019-06-14

## 2019-04-16 NOTE — PROGRESS NOTES
Abeba Bejarano is a 62year old female who presents for Patient presents with:  Osteoarthritis  Arm Pain  Leg Pain  . HPI:     I had the pleasure of seeing Abeba Bejarano on 7/6/2017 for evaluation.      She is a pleasant 64year old who has hx of fibromyal gabpentin 300mg at night. She tapered from 400mg b/c it made her too dizzy. She has no drowsiness or dizziness. 10/8/2018  Her pain is 5/10 pain. She is doing well on gabpnetin 300mg at night. She feels 9/10 pain in her left thumb.  She's had this p 81 MG Oral Tab EC Take 1 tablet (81 mg total) by mouth daily.  take 1 tablet (81MG)  by oral route  every day Disp: 90 tablet Rfl: 3   Olopatadine HCl 0.1 % Ophthalmic Solution  Disp:  Rfl:       Past Medical History:   Diagnosis Date   • Anxiety state, uns changes,   CVS: No chest pain, no heart disease, can have pain with her left hand with walking - she had heart testing with dr. Ivan Nolen -   RS: has SOB, some Cough, No Pleurtic pain,   GI: No nausea, no vomiiting, no abominal pain, no hx of ulcer, heartbur MM/HR 11 13   EKTA SCREEN      Negative  Negative   COMPLEMENT C3      88 - 201 mg/dL  134   COMPLEMENT C4      18 - 55 mg/dL  27   Anti-Javed Antibody      Negative  Negative   Anti-Javed/RNP Antibody      Negative  Negative   Anti Double Strand DNA      < MCV      80.0 - 100.0 fL 90.9   MCH      26.0 - 34.0 pg 28.6   MCHC      31.0 - 37.0 g/dL 31.5   RDW      11.0 - 15.0 % 13.4   RDW-SD      35.1 - 46.3 fL 44.4   Platelet Count      896.0 - 450.0 10(3)uL 300.0   Cholesterol, Total      <200 mg/dL 147   HD an surgical intervention -   She is finsihing physical therapy - helping. She feels that she wants more PT.  This was helping her the most.   - meloxicam 15mg adya - cuased n/v   She stopped PT so far and f/u ortho - dr. James Chin - but in 8/2017 - this helpe

## 2019-04-16 NOTE — PATIENT INSTRUCTIONS
1. Cont. Gabapentin 300mg at night   2. Diclofenac 1 % gel -  For thumb and knee   3. Follow up with in 6 months.    4. Add physical therapy for shoulders and neck - for fibromyalgia

## 2019-04-20 PROBLEM — E66.01 CLASS 2 SEVERE OBESITY DUE TO EXCESS CALORIES WITH SERIOUS COMORBIDITY AND BODY MASS INDEX (BMI) OF 35.0 TO 35.9 IN ADULT  (HCC): Status: ACTIVE | Noted: 2018-01-10

## 2019-04-20 PROBLEM — E66.01 CLASS 2 SEVERE OBESITY DUE TO EXCESS CALORIES WITH SERIOUS COMORBIDITY AND BODY MASS INDEX (BMI) OF 35.0 TO 35.9 IN ADULT (HCC): Status: ACTIVE | Noted: 2018-01-10

## 2019-04-20 PROBLEM — E66.812 CLASS 2 SEVERE OBESITY DUE TO EXCESS CALORIES WITH SERIOUS COMORBIDITY AND BODY MASS INDEX (BMI) OF 35.0 TO 35.9 IN ADULT (HCC): Status: ACTIVE | Noted: 2018-01-10

## 2019-04-20 PROBLEM — E66.01 CLASS 2 SEVERE OBESITY DUE TO EXCESS CALORIES WITH SERIOUS COMORBIDITY AND BODY MASS INDEX (BMI) OF 35.0 TO 35.9 IN ADULT: Status: ACTIVE | Noted: 2018-01-10

## 2019-04-20 PROBLEM — Z79.82 LONG TERM CURRENT USE OF ASPIRIN: Status: ACTIVE | Noted: 2019-04-16

## 2019-04-20 PROBLEM — Z78.0 POSTMENOPAUSAL: Status: ACTIVE | Noted: 2019-04-16

## 2019-04-20 PROBLEM — H10.13 ALLERGIC CONJUNCTIVITIS OF BOTH EYES: Status: RESOLVED | Noted: 2018-02-23 | Resolved: 2019-04-20

## 2019-04-20 PROBLEM — E66.09 NON MORBID OBESITY DUE TO EXCESS CALORIES: Status: RESOLVED | Noted: 2017-08-15 | Resolved: 2019-04-20

## 2019-04-20 PROBLEM — Z11.59 NEED FOR HEPATITIS C SCREENING TEST: Status: RESOLVED | Noted: 2018-04-11 | Resolved: 2019-04-20

## 2019-04-20 PROBLEM — H53.8 BLURRY VISION: Status: RESOLVED | Noted: 2017-11-29 | Resolved: 2019-04-20

## 2019-04-20 RX ORDER — ASPIRIN 81 MG/1
81 TABLET ORAL DAILY
Qty: 90 TABLET | Refills: 3 | Status: SHIPPED | OUTPATIENT
Start: 2019-04-20 | End: 2020-02-04

## 2019-04-20 RX ORDER — OLOPATADINE HYDROCHLORIDE 1 MG/ML
1 SOLUTION/ DROPS OPHTHALMIC 2 TIMES DAILY
Refills: 0 | COMMUNITY
Start: 2019-01-14 | End: 2019-06-14

## 2019-04-20 RX ORDER — PANTOPRAZOLE SODIUM 40 MG/1
40 TABLET, DELAYED RELEASE ORAL
Qty: 90 TABLET | Refills: 3 | Status: SHIPPED | OUTPATIENT
Start: 2019-04-20 | End: 2020-02-04

## 2019-04-20 RX ORDER — METOPROLOL SUCCINATE 25 MG/1
12.5 TABLET, EXTENDED RELEASE ORAL DAILY
Qty: 45 TABLET | Refills: 0 | Status: SHIPPED | OUTPATIENT
Start: 2019-04-20 | End: 2019-07-16

## 2019-04-20 RX ORDER — PAROXETINE 10 MG/1
10 TABLET, FILM COATED ORAL EVERY MORNING
Qty: 90 TABLET | Refills: 3 | Status: SHIPPED | OUTPATIENT
Start: 2019-04-20 | End: 2020-02-04

## 2019-04-20 RX ORDER — PRAVASTATIN SODIUM 20 MG
20 TABLET ORAL NIGHTLY
Qty: 90 TABLET | Refills: 3 | Status: SHIPPED | OUTPATIENT
Start: 2019-04-20 | End: 2020-02-04

## 2019-04-20 NOTE — PATIENT INSTRUCTIONS
Rosa Guillory's SCREENING SCHEDULE   Tests on this list are recommended by your physician but may not be covered, or covered at this frequency, by your insurer. Please check with your insurance carrier before scheduling to verify coverage.    PREVENTATIVE Covered every 10 years- more often if abnormal Colonoscopy due on 09/08/2025 Update Health Maintenance if applicable    Flex Sigmoidoscopy Screen  Covered every 5 years No results found for this or any previous visit. No flowsheet data found.      Fecal O for this or any previous visit. Please get once after your 65th birthday    Hepatitis B for Moderate/High Risk       No orders found for this or any previous visit.  Medium/high risk factors:   End-stage renal disease   Hemophiliacs who received Factor VIII

## 2019-04-20 NOTE — PROGRESS NOTES
HPI:   Tavia Haskins is a 62year old female who presents for a MA (Medicare Advantage) [facility] (Once per calendar year). Pt denies any acute complaints at this time.       Fall/Risk Assessment abnormal    She has been screened for Falls and is High Ri based on screening of functional status.    Memory Problems?: Yes       Depression Screening (PHQ-2/PHQ-9): Over the LAST 2 WEEKS   Little interest or pleasure in doing things (over the last two weeks)?: Several days  Feeling down, depressed, or hopeless (o of both eyes     Gastroesophageal reflux disease without esophagitis     Essential hypertension     Postmenopausal     Dextroscoliosis     Hypercholesteremia     Class 2 severe obesity due to excess calories with serious comorbidity and body mass index (BM total) by mouth every morning before breakfast.   Pravastatin Sodium 20 MG Oral Tab Take 1 tablet (20 mg total) by mouth nightly. PARoxetine HCl 10 MG Oral Tab Take 1 tablet (10 mg total) by mouth every morning.    Diclofenac Sodium 1 % Transdermal Gel Ap headaches  PSYCHE: denies anxiety, + depression  HEMATOLOGIC: denies hx of anemia  ENDOCRINE: denies thyroid history  ALL/ASTHMA: denies hx of allergy or asthma    EXAM:   /70 (BP Location: Right arm, Patient Position: Sitting, Cuff Size: adult)   Pu lines (L>R), no cyanosis or edema   Pulses: 2+ and symmetric   Skin: Skin color, texture, turgor normal, no rashes or lesions   Lymph nodes: Cervical, supraclavicular, and axillary nodes normal   Neurologic: Normal       Vaccination History     Immunizatio alkaline phosphatase level  -  Stable and currently asymptomatic.  -  Will obtain RUQ US and f/u as appropriate. Internal hemorrhoids  - Pt counseled with regards to increased water and high fiber diet intake.     Allergic conjunctivitis of both eyes  - straight to the ER; she demonstrates understanding and agrees to do so. - PARoxetine HCl 10 MG Oral Tab; Take 1 tablet (10 mg total) by mouth every morning.     Depression screening    Encounter for screening mammogram for breast cancer  -     SAHARA SCREENIN 09/08/2025 Update Health Maintenance if applicable    Flex Sigmoidoscopy Screen every 10 years No results found for this or any previous visit. No flowsheet data found. Fecal Occult Blood Annually No results found for: FOB No flowsheet data found.     G your pharmacy  prescription benefits      SPECIFIC DISEASE MONITORING Internal Lab or Procedure External Lab or Procedure      Annual Monitoring of Persistent     Medications (ACE/ARB, digoxin diuretics, anticonvulsants.)    Potassium  Annually Potassium (

## 2019-04-23 ENCOUNTER — OFFICE VISIT (OUTPATIENT)
Dept: OBGYN CLINIC | Facility: CLINIC | Age: 59
End: 2019-04-23
Payer: COMMERCIAL

## 2019-04-23 VITALS
DIASTOLIC BLOOD PRESSURE: 76 MMHG | BODY MASS INDEX: 35.19 KG/M2 | HEIGHT: 60.7 IN | HEART RATE: 49 BPM | SYSTOLIC BLOOD PRESSURE: 152 MMHG | WEIGHT: 184 LBS

## 2019-04-23 DIAGNOSIS — N84.1 CERVICAL POLYP: ICD-10-CM

## 2019-04-23 DIAGNOSIS — Z12.31 VISIT FOR SCREENING MAMMOGRAM: ICD-10-CM

## 2019-04-23 DIAGNOSIS — Z01.411 ENCOUNTER FOR GYNECOLOGICAL EXAMINATION WITH ABNORMAL FINDING: Primary | ICD-10-CM

## 2019-04-23 PROCEDURE — 99386 PREV VISIT NEW AGE 40-64: CPT | Performed by: OBSTETRICS & GYNECOLOGY

## 2019-04-24 ENCOUNTER — TELEPHONE (OUTPATIENT)
Dept: FAMILY MEDICINE CLINIC | Facility: CLINIC | Age: 59
End: 2019-04-24

## 2019-04-24 NOTE — TELEPHONE ENCOUNTER
Per Dr Sabas Rich, patient is to have an u/s of her gall blader and a sleep titration, she had a sleep study done last year and was supposed to have a titration but never went back. Patient informed and # was provided to her to schedule appointments.

## 2019-04-26 NOTE — PROGRESS NOTES
Moris Mendez is a 62year old female  No LMP recorded. Patient is postmenopausal. Patient presents with:  Gyn Exam: ANNUAL EXAM -- last seen in . Was suppose to return for cervical polyp removal. Never did. No  bleed. No change in hx  .     O Not on file        Inability: Not on file      Transportation needs:        Medical: Not on file        Non-medical: Not on file    Tobacco Use      Smoking status: Never Smoker      Smokeless tobacco: Never Used    Substance and Sexual Activity      Alcoh Asked        Pt has a defibrillator: Not Asked        Breast feeding: Not Asked        Reaction to local anesthetic: No    Social History Narrative      The patient uses the following assistive device(s):  quad cane.         The patient does live in a home Cap, TAKE 1 CAPSULE (300 MG TOTAL) BY MOUTH NIGHTLY., Disp: 90 capsule, Rfl: 1  •  Cyanocobalamin (B-12 OR), Take 1 tablet by mouth daily.  , Disp: , Rfl:     ALLERGIES:  No Known Allergies      Review of Systems:  Constitutional:    denies fatigue, night s motion   (+) 1/2 polyp  Uterus:    normal in size, contour, position, mobility, without tenderness  Adnexa:   normal without masses or tenderness  Perineum:   normal  Anus: no hemorroids     Assessment & Plan:  Monet Mejias was seen today for gyn exam.    Richard Trejo

## 2019-06-01 ENCOUNTER — HOSPITAL ENCOUNTER (OUTPATIENT)
Dept: ULTRASOUND IMAGING | Facility: HOSPITAL | Age: 59
Discharge: HOME | End: 2019-06-01
Payer: MEDICARE

## 2019-06-01 ENCOUNTER — HOSPITAL ENCOUNTER (OUTPATIENT)
Dept: MAMMOGRAPHY | Facility: HOSPITAL | Age: 59
Discharge: HOME | End: 2019-06-01
Payer: MEDICARE

## 2019-06-01 DIAGNOSIS — Z12.31 VISIT FOR SCREENING MAMMOGRAM: ICD-10-CM

## 2019-06-01 DIAGNOSIS — R74.8 ELEVATED SERUM ALKALINE PHOSPHATASE LEVEL: ICD-10-CM

## 2019-06-01 DIAGNOSIS — K80.20 CALCULUS OF GALLBLADDER WITHOUT CHOLECYSTITIS WITHOUT OBSTRUCTION: ICD-10-CM

## 2019-06-01 PROCEDURE — 77067 SCR MAMMO BI INCL CAD: CPT | Performed by: OBSTETRICS & GYNECOLOGY

## 2019-06-01 PROCEDURE — 76705 ECHO EXAM OF ABDOMEN: CPT

## 2019-06-01 PROCEDURE — 77063 BREAST TOMOSYNTHESIS BI: CPT | Performed by: OBSTETRICS & GYNECOLOGY

## 2019-06-02 PROBLEM — K76.0 HEPATIC STEATOSIS: Status: ACTIVE | Noted: 2019-06-01

## 2019-06-02 PROBLEM — Z13.31 DEPRESSION SCREENING: Status: RESOLVED | Noted: 2018-04-11 | Resolved: 2019-06-02

## 2019-06-02 PROBLEM — Z00.00 ENCOUNTER FOR MEDICARE ANNUAL WELLNESS EXAM: Status: RESOLVED | Noted: 2018-04-11 | Resolved: 2019-06-02

## 2019-06-02 PROBLEM — Z12.31 ENCOUNTER FOR SCREENING MAMMOGRAM FOR BREAST CANCER: Status: RESOLVED | Noted: 2018-04-11 | Resolved: 2019-06-02

## 2019-06-03 ENCOUNTER — TELEPHONE (OUTPATIENT)
Dept: FAMILY MEDICINE CLINIC | Facility: CLINIC | Age: 59
End: 2019-06-03

## 2019-06-03 NOTE — TELEPHONE ENCOUNTER
----- Message from Ke Palma MD sent at 6/2/2019  7:40 PM CDT -----  No changes to current medications; will continue to monitor for development of new symptoms and/or raise on LFTs; ok to file.

## 2019-06-04 NOTE — TELEPHONE ENCOUNTER
Called patient,  verified, results below notified              ---- Message from Charles Pemberton MD sent at 2019  7:40 PM CDT -----  No changes to current medications; will continue to monitor for development of new symptoms and/or raise on LFTs; ok

## 2019-06-06 ENCOUNTER — TELEPHONE (OUTPATIENT)
Dept: FAMILY MEDICINE CLINIC | Facility: CLINIC | Age: 59
End: 2019-06-06

## 2019-06-06 NOTE — PROGRESS NOTES
Send letter: Your mammogram was negative / normal. The next one is due in one year. I encourage you to still do monthly self breast exam (best time if you get periods is week immediately following your period). Have a good day.

## 2019-06-06 NOTE — TELEPHONE ENCOUNTER
----- Message from Antonio Hagan MD sent at 6/2/2019  7:40 PM CDT -----  No changes to current medications; will continue to monitor for development of new symptoms and/or raise on LFTs; ok to file.

## 2019-06-14 ENCOUNTER — TELEPHONE (OUTPATIENT)
Dept: INTERNAL MEDICINE CLINIC | Facility: CLINIC | Age: 59
End: 2019-06-14

## 2019-06-14 ENCOUNTER — OFFICE VISIT (OUTPATIENT)
Dept: ORTHOPEDICS CLINIC | Facility: CLINIC | Age: 59
End: 2019-06-14
Payer: COMMERCIAL

## 2019-06-14 DIAGNOSIS — M25.512 CHRONIC PAIN OF BOTH SHOULDERS: ICD-10-CM

## 2019-06-14 DIAGNOSIS — M25.511 CHRONIC PAIN OF BOTH SHOULDERS: ICD-10-CM

## 2019-06-14 DIAGNOSIS — G89.29 CHRONIC PAIN OF BOTH SHOULDERS: ICD-10-CM

## 2019-06-14 DIAGNOSIS — M79.7 FIBROMYALGIA: Primary | ICD-10-CM

## 2019-06-14 DIAGNOSIS — M25.562 ACUTE PAIN OF LEFT KNEE: Primary | ICD-10-CM

## 2019-06-14 PROCEDURE — 99213 OFFICE O/P EST LOW 20 MIN: CPT | Performed by: ORTHOPAEDIC SURGERY

## 2019-06-14 PROCEDURE — G0463 HOSPITAL OUTPT CLINIC VISIT: HCPCS | Performed by: ORTHOPAEDIC SURGERY

## 2019-06-14 NOTE — TELEPHONE ENCOUNTER
Patient made future appointments for physical therapy and she needs referral for 8 appts. Please advise.

## 2019-06-14 NOTE — PROGRESS NOTES
This is a pleasant 49-year-old female with a previous diagnosis of left knee pain. Patient received cortisone injections, physical therapy, and Visco supplementation and continues to have left knee pain. She comes in today for repeat evaluation.     On ex

## 2019-07-06 DIAGNOSIS — I10 ESSENTIAL HYPERTENSION: ICD-10-CM

## 2019-07-08 RX ORDER — METOPROLOL SUCCINATE 25 MG/1
TABLET, EXTENDED RELEASE ORAL
Qty: 45 TABLET | Refills: 0 | OUTPATIENT
Start: 2019-07-08

## 2019-07-16 ENCOUNTER — OFFICE VISIT (OUTPATIENT)
Dept: FAMILY MEDICINE CLINIC | Facility: CLINIC | Age: 59
End: 2019-07-16
Payer: COMMERCIAL

## 2019-07-16 VITALS
HEART RATE: 62 BPM | OXYGEN SATURATION: 97 % | WEIGHT: 184 LBS | DIASTOLIC BLOOD PRESSURE: 60 MMHG | SYSTOLIC BLOOD PRESSURE: 130 MMHG | BODY MASS INDEX: 35 KG/M2

## 2019-07-16 DIAGNOSIS — I10 ESSENTIAL HYPERTENSION: Primary | ICD-10-CM

## 2019-07-16 DIAGNOSIS — M25.561 ACUTE PAIN OF BOTH KNEES: ICD-10-CM

## 2019-07-16 DIAGNOSIS — M25.562 ACUTE PAIN OF BOTH KNEES: ICD-10-CM

## 2019-07-16 PROCEDURE — 99214 OFFICE O/P EST MOD 30 MIN: CPT

## 2019-07-16 RX ORDER — IBUPROFEN 200 MG
400 TABLET ORAL EVERY 4 HOURS PRN
COMMUNITY
End: 2020-02-04 | Stop reason: ALTCHOICE

## 2019-07-16 RX ORDER — METOPROLOL SUCCINATE 25 MG/1
12.5 TABLET, EXTENDED RELEASE ORAL DAILY
Qty: 45 TABLET | Refills: 1 | Status: SHIPPED | OUTPATIENT
Start: 2019-07-16 | End: 2020-02-04

## 2019-07-16 NOTE — PROGRESS NOTES
HPI:    Patient ID: Devan Proctor is a 62year old female. Hypertension   This is a chronic problem. Episode onset: few years ago. The problem has been waxing and waning since onset. The problem is controlled.  Pertinent negatives include no anxiety, pa diarrhea, nausea and vomiting. Genitourinary: Negative for decreased urine volume, dysuria and hematuria. Musculoskeletal: Positive for arthralgias, gait problem, joint swelling and stiffness. Negative for back pain and neck pain.    Skin: Negative for and effusion. She exhibits no swelling, no ecchymosis and no erythema. Tenderness found. Medial joint line tenderness noted. No lateral joint line tenderness noted. Neurological: She is alert and oriented to person, place, and time. Skin: Skin is warm.

## 2019-07-16 NOTE — PATIENT INSTRUCTIONS
Dolor En La Rodilla [Knee Pain, Uncertain Cause]    La rodilla puede doler por varias causas comunes.  Puede tratarse de un esguince (distención [sprain]) de los ligamentos que sostienen la articulación; de aaliyah lesión en el recubrimiento cartilaginoso que ? Puede quitarse el inmovilizador (soporte) para bañarse y dormir, a menos que le indiquen otra cosa diferente. Programe aaliyah VISITA DE CONTROL con moncada médico dentro de 1 ó 2 semanas, o según le hayan indicado si no se siente mejor.   [NOTA: Si le ervin hecho

## 2019-07-17 ENCOUNTER — HOSPITAL ENCOUNTER (OUTPATIENT)
Dept: GENERAL RADIOLOGY | Facility: HOSPITAL | Age: 59
Discharge: HOME | End: 2019-07-17
Payer: MEDICARE

## 2019-07-17 DIAGNOSIS — M25.562 LEFT KNEE PAIN: ICD-10-CM

## 2019-07-17 DIAGNOSIS — M25.561 RIGHT KNEE PAIN: ICD-10-CM

## 2019-07-17 PROCEDURE — 73564 X-RAY EXAM KNEE 4 OR MORE: CPT

## 2019-07-22 ENCOUNTER — TELEPHONE (OUTPATIENT)
Dept: FAMILY MEDICINE CLINIC | Facility: CLINIC | Age: 59
End: 2019-07-22

## 2019-07-22 NOTE — TELEPHONE ENCOUNTER
X ray results were discussed with Mino Jones, she verbalized understanding and has no further questions.

## 2019-07-22 NOTE — TELEPHONE ENCOUNTER
----- Message from Lizzette Reardon MD sent at 7/18/2019  9:01 AM CDT -----  Mild L knee OA, no changes to current medications; ok to file.

## 2019-07-23 ENCOUNTER — OFFICE VISIT (OUTPATIENT)
Dept: PHYSICAL THERAPY | Facility: HOSPITAL | Age: 59
End: 2019-07-23
Attending: PEDIATRICS
Payer: MEDICARE

## 2019-07-23 DIAGNOSIS — M25.512 CHRONIC PAIN OF BOTH SHOULDERS: ICD-10-CM

## 2019-07-23 DIAGNOSIS — M25.511 CHRONIC PAIN OF BOTH SHOULDERS: ICD-10-CM

## 2019-07-23 DIAGNOSIS — G89.29 CHRONIC PAIN OF BOTH SHOULDERS: ICD-10-CM

## 2019-07-23 DIAGNOSIS — M79.7 FIBROMYALGIA: ICD-10-CM

## 2019-07-23 PROCEDURE — 97163 PT EVAL HIGH COMPLEX 45 MIN: CPT

## 2019-07-23 PROCEDURE — 97110 THERAPEUTIC EXERCISES: CPT

## 2019-07-23 NOTE — PROGRESS NOTES
CERVICAL/SHOULDER SPINE EVALUATION:   Referring Physician: Sarah Brown MD    Date of Onset: 3 years ago  Date of Service: 7/23/19   Diagnosis: Fibromyalgia (M79.7)  Chronic pain of both shoulders (M25.511,G89.29,M25.512)   SUBJECTIVE:   PATIENT Sidebend Min loss + R neck   R Rotation Min loss + L neck   L Rotation Min loss + R neck   Protrusion No loss    Retraction Mod loss + B neck         Shoulder AROM:       R    L   Flex        150 145   Abd 118 115   ER 80 80   IR To T10 To T9     Strength limitation: None  Rehab Potential: good  FOTO: 51/100    Patient was advised of these findings, precautions, and treatment options and has agreed to actively participate in planning and for this course of care.     Thank you for your referral. Please co-sig

## 2019-07-25 ENCOUNTER — OFFICE VISIT (OUTPATIENT)
Dept: PHYSICAL THERAPY | Facility: HOSPITAL | Age: 59
End: 2019-07-25
Attending: PEDIATRICS
Payer: MEDICARE

## 2019-07-25 PROCEDURE — 97110 THERAPEUTIC EXERCISES: CPT

## 2019-07-25 NOTE — PROGRESS NOTES
Dx: Fibromyalgia (M79.7)  Chronic pain of both shoulders (M25.511,G89.29,M25.512)         Authorized # of Visits:  12 (/medicaid)         Next MD visit: none scheduled  Fall Risk: standard         Precautions:   anxiety, depression, obesity, fibromyalgia min

## 2019-07-29 ENCOUNTER — APPOINTMENT (OUTPATIENT)
Dept: PHYSICAL THERAPY | Facility: HOSPITAL | Age: 59
End: 2019-07-29
Attending: PEDIATRICS
Payer: MEDICARE

## 2019-07-31 ENCOUNTER — OFFICE VISIT (OUTPATIENT)
Dept: PHYSICAL THERAPY | Facility: HOSPITAL | Age: 59
End: 2019-07-31
Attending: PEDIATRICS
Payer: MEDICARE

## 2019-07-31 PROCEDURE — 97140 MANUAL THERAPY 1/> REGIONS: CPT

## 2019-07-31 PROCEDURE — 97110 THERAPEUTIC EXERCISES: CPT

## 2019-07-31 NOTE — PROGRESS NOTES
Dx: Fibromyalgia (M79.7)  Chronic pain of both shoulders (M25.511,G89.29,M25.512)         Authorized # of Visits:  12 (/medicaid)         Next MD visit: none scheduled  Fall Risk: standard         Precautions:   anxiety, depression, obesity, fibromyalgia will be independent with HEP, it's progression, and self-management of their symptoms  2. Patient will demo cervical AROM WNL to be able to turn their head to drive and look up to see a bird without difficulty  3.  Patient will demo B shoulder AROM at least

## 2019-08-05 ENCOUNTER — OFFICE VISIT (OUTPATIENT)
Dept: PHYSICAL THERAPY | Facility: HOSPITAL | Age: 59
End: 2019-08-05
Attending: PEDIATRICS
Payer: MEDICARE

## 2019-08-05 PROCEDURE — 97140 MANUAL THERAPY 1/> REGIONS: CPT

## 2019-08-05 PROCEDURE — 97110 THERAPEUTIC EXERCISES: CPT

## 2019-08-05 NOTE — PROGRESS NOTES
Dx: Fibromyalgia (M79.7)  Chronic pain of both shoulders (M25.511,G89.29,M25.512)         Authorized # of Visits:  12 (/medicaid)         Next MD visit: none scheduled  Fall Risk: standard         Precautions:   anxiety, depression, obesity, fibromyalgia stretch and seated c rotation)   HEP:  Handout for sitting posture, supine lying with cervical roll, thoracic ext over ball HEP:  Review         Assessment: Patient decreased 5/10 post session pain.  Explain to patient and daughter avoid carry heavy lifting

## 2019-08-08 ENCOUNTER — OFFICE VISIT (OUTPATIENT)
Dept: PHYSICAL THERAPY | Facility: HOSPITAL | Age: 59
End: 2019-08-08
Attending: PEDIATRICS
Payer: MEDICARE

## 2019-08-08 PROCEDURE — 97140 MANUAL THERAPY 1/> REGIONS: CPT

## 2019-08-08 PROCEDURE — 97110 THERAPEUTIC EXERCISES: CPT

## 2019-08-08 NOTE — PROGRESS NOTES
Dx: Fibromyalgia (M79.7)  Chronic pain of both shoulders (M25.511,G89.29,M25.512)         Authorized # of Visits:  12 (/medicaid)         Next MD visit: 9/17/19  Fall Risk: standard         Precautions:   anxiety, depression, obesity, fibromyalgia, hyper cervical traction   HEP: added supine chin nods, supine c rotation, seated shoulder rolls (d/c UT stretch and seated c rotation)   HEP:  Handout for sitting posture, supine lying with cervical roll, thoracic ext over ball HEP:  Review          Assessment:

## 2019-08-12 ENCOUNTER — OFFICE VISIT (OUTPATIENT)
Dept: PHYSICAL THERAPY | Facility: HOSPITAL | Age: 59
End: 2019-08-12
Attending: PEDIATRICS
Payer: MEDICARE

## 2019-08-12 PROCEDURE — 97140 MANUAL THERAPY 1/> REGIONS: CPT

## 2019-08-12 PROCEDURE — 97110 THERAPEUTIC EXERCISES: CPT

## 2019-08-12 NOTE — PROGRESS NOTES
Dx: Fibromyalgia (M79.7)  Chronic pain of both shoulders (M25.511,G89.29,M25.512)         Authorized # of Visits:  12 (/medicaid)         Next MD visit: 9/17/19  Fall Risk: standard         Precautions:   anxiety, depression, obesity, fibromyalgia, hyper traction Manual:   Suboccipital release   Gentle manual cervical traction Manual:  Suboccipital release  STM LUT  Gentle manual cervical traction Man Mob:  Gentle manual cerv traction  Cervical harmonics      HEP:  Handout for sitting posture, supine lying

## 2019-08-15 ENCOUNTER — OFFICE VISIT (OUTPATIENT)
Dept: PHYSICAL THERAPY | Facility: HOSPITAL | Age: 59
End: 2019-08-15
Attending: PEDIATRICS
Payer: MEDICARE

## 2019-08-15 PROCEDURE — 97110 THERAPEUTIC EXERCISES: CPT

## 2019-08-15 PROCEDURE — 97140 MANUAL THERAPY 1/> REGIONS: CPT

## 2019-08-15 NOTE — PROGRESS NOTES
DISCHARGE SUMMARY  Dx: Fibromyalgia (M79.7)  Chronic pain of both shoulders (M25.511,G89.29,M25.512)         Authorized # of Visits:  12 (/medicaid)         Next MD visit: 9/17/19  Fall Risk: standard         Precautions:   anxiety, depression, obesity, horz abd 2 x 15  -Supine YTB flex with horz abd  2 x 10  -Supine>sit transfers  -B ER with RTB 1 x 10; then YTB x 10   -Wall push ups on SB 2 x 10  -Touchdown wall slides x 7*pain  -B sh ext with YTB 2 x 10  TE:  Prone scap retraction 2x10, 5\" holds  Seat

## 2019-09-10 ENCOUNTER — APPOINTMENT (OUTPATIENT)
Dept: PHYSICAL THERAPY | Facility: HOSPITAL | Age: 59
End: 2019-09-10
Attending: INTERNAL MEDICINE
Payer: MEDICARE

## 2019-09-12 DIAGNOSIS — M79.7 FIBROMYALGIA: ICD-10-CM

## 2019-09-12 RX ORDER — GABAPENTIN 300 MG/1
300 CAPSULE ORAL NIGHTLY
Qty: 90 CAPSULE | Refills: 1 | Status: SHIPPED | OUTPATIENT
Start: 2019-09-12 | End: 2020-02-04

## 2019-09-12 NOTE — TELEPHONE ENCOUNTER
Last filled: 3/25/19 #90 cap with 1 refill  LOV: 4/16/19  Future Appointments   Date Time Provider Dawn Arias   9/17/2019 10:20 AM Zara Prieto MD 2014 [facility]   1/14/2020  9:00 AM Nury Rodriguez MD [facility] 8 [facility]     Labs:     S

## 2020-02-04 ENCOUNTER — OFFICE VISIT (OUTPATIENT)
Dept: RHEUMATOLOGY | Facility: CLINIC | Age: 60
End: 2020-02-04
Payer: COMMERCIAL

## 2020-02-04 ENCOUNTER — OFFICE VISIT (OUTPATIENT)
Dept: FAMILY MEDICINE CLINIC | Facility: CLINIC | Age: 60
End: 2020-02-04
Payer: COMMERCIAL

## 2020-02-04 VITALS
BODY MASS INDEX: 37.11 KG/M2 | HEIGHT: 60.5 IN | HEART RATE: 60 BPM | DIASTOLIC BLOOD PRESSURE: 69 MMHG | SYSTOLIC BLOOD PRESSURE: 133 MMHG | WEIGHT: 194 LBS

## 2020-02-04 VITALS
OXYGEN SATURATION: 96 % | SYSTOLIC BLOOD PRESSURE: 146 MMHG | BODY MASS INDEX: 37.11 KG/M2 | HEART RATE: 56 BPM | DIASTOLIC BLOOD PRESSURE: 70 MMHG | WEIGHT: 194 LBS | HEIGHT: 60.5 IN

## 2020-02-04 DIAGNOSIS — F32.4 MAJOR DEPRESSIVE DISORDER WITH SINGLE EPISODE, IN PARTIAL REMISSION (HCC): ICD-10-CM

## 2020-02-04 DIAGNOSIS — M79.7 FIBROMYALGIA: Primary | ICD-10-CM

## 2020-02-04 DIAGNOSIS — M79.7 FIBROMYALGIA: ICD-10-CM

## 2020-02-04 DIAGNOSIS — Z79.82 LONG TERM CURRENT USE OF ASPIRIN: ICD-10-CM

## 2020-02-04 DIAGNOSIS — E78.00 HYPERCHOLESTEREMIA: ICD-10-CM

## 2020-02-04 DIAGNOSIS — M25.512 CHRONIC PAIN OF BOTH SHOULDERS: ICD-10-CM

## 2020-02-04 DIAGNOSIS — K21.9 GASTROESOPHAGEAL REFLUX DISEASE WITHOUT ESOPHAGITIS: ICD-10-CM

## 2020-02-04 DIAGNOSIS — I10 ESSENTIAL HYPERTENSION: ICD-10-CM

## 2020-02-04 DIAGNOSIS — G89.29 CHRONIC PAIN OF BOTH SHOULDERS: ICD-10-CM

## 2020-02-04 DIAGNOSIS — M25.511 CHRONIC PAIN OF BOTH SHOULDERS: ICD-10-CM

## 2020-02-04 DIAGNOSIS — Z00.00 HEALTHCARE MAINTENANCE: ICD-10-CM

## 2020-02-04 DIAGNOSIS — H53.9 VISION CHANGES: Primary | ICD-10-CM

## 2020-02-04 PROCEDURE — G0463 HOSPITAL OUTPT CLINIC VISIT: HCPCS | Performed by: INTERNAL MEDICINE

## 2020-02-04 PROCEDURE — 99214 OFFICE O/P EST MOD 30 MIN: CPT | Performed by: INTERNAL MEDICINE

## 2020-02-04 PROCEDURE — 99214 OFFICE O/P EST MOD 30 MIN: CPT | Performed by: FAMILY MEDICINE

## 2020-02-04 RX ORDER — METOPROLOL SUCCINATE 25 MG/1
12.5 TABLET, EXTENDED RELEASE ORAL DAILY
Qty: 45 TABLET | Refills: 1 | Status: SHIPPED | OUTPATIENT
Start: 2020-02-04 | End: 2020-08-24

## 2020-02-04 RX ORDER — PANTOPRAZOLE SODIUM 40 MG/1
40 TABLET, DELAYED RELEASE ORAL
Qty: 90 TABLET | Refills: 3 | Status: SHIPPED | OUTPATIENT
Start: 2020-02-04 | End: 2021-01-23

## 2020-02-04 RX ORDER — NAPROXEN 500 MG/1
500 TABLET ORAL 2 TIMES DAILY PRN
Qty: 60 TABLET | Refills: 3 | Status: SHIPPED | OUTPATIENT
Start: 2020-02-04 | End: 2021-04-06

## 2020-02-04 RX ORDER — GABAPENTIN 300 MG/1
300 CAPSULE ORAL NIGHTLY
Qty: 90 CAPSULE | Refills: 3 | Status: SHIPPED | OUTPATIENT
Start: 2020-02-04 | End: 2021-04-06

## 2020-02-04 RX ORDER — PAROXETINE 10 MG/1
10 TABLET, FILM COATED ORAL EVERY MORNING
Qty: 90 TABLET | Refills: 3 | Status: SHIPPED | OUTPATIENT
Start: 2020-02-04 | End: 2021-04-06

## 2020-02-04 RX ORDER — ASPIRIN 81 MG/1
81 TABLET ORAL DAILY
Qty: 90 TABLET | Refills: 3 | Status: SHIPPED | OUTPATIENT
Start: 2020-02-04 | End: 2021-03-05

## 2020-02-04 RX ORDER — PRAVASTATIN SODIUM 20 MG
20 TABLET ORAL NIGHTLY
Qty: 90 TABLET | Refills: 0 | Status: SHIPPED | OUTPATIENT
Start: 2020-02-04 | End: 2020-04-20

## 2020-02-04 NOTE — PATIENT INSTRUCTIONS
1. Cont. Gabapentin 300mg at night   2. Diclofenac 1 % gel -  For thumb and knee   3. Follow up with in 6 months. 4. Try naproxen 500mg twice a day - take with food as needed.

## 2020-02-04 NOTE — PROGRESS NOTES
Lexi Flores is a 61year old female who presents for Patient presents with:  Joint Pain: muscle pain  Fibromyalgia Syndrome  . HPI:     I had the pleasure of seeing Lexi Flores on 7/6/2017 for evaluation.      She is a pleasant 64year old who has hx ok with the gabpentin 300mg at night. She tapered from 400mg b/c it made her too dizzy. She has no drowsiness or dizziness. 10/8/2018  Her pain is 5/10 pain. She is doing well on gabpnetin 300mg at night. She feels 9/10 pain in her left thumb.  She' morning before breakfast. 90 tablet 3   • PARoxetine HCl 10 MG Oral Tab Take 1 tablet (10 mg total) by mouth every morning. 90 tablet 3   • Pravastatin Sodium 20 MG Oral Tab Take 1 tablet (20 mg total) by mouth nightly.  90 tablet 0   • Cyanocobalamin (B-12 BRCA gene + Neg    • Ashkenazi Jehovah's witness Descent Neg       Social History:  Social History    Tobacco Use      Smoking status: Never Smoker      Smokeless tobacco: Never Used    Alcohol use: No    Drug use: No     Lives with daughter        REVIEW OF SYSTEMS: 0. 16   ALPHA-2-GLOBULINS      0.6 - 1.0 g/dL 0.73 0.70   BETA GLOBULINS      0.7 - 1.3 g/dL 0.95 0.84   GAMMA GLOBULINS      0.5 - 1.7 g/dL 1.47 1.45   ALBUMIN/GLOBULIN RATIO      1.0 - 2.0 1.30 1.31   SPE INTERPRETATION       See Comment See Comment   Ant Negative   Blood Urine      Negative Negative   Nitrite Urine      Negative Negative   Urobilinogen Urine      <2.0 <2.0   Leukocyte Esterase Urine      Negative Negative   ASCORBIC ACID      Negative mg/dL Negative   Microscopic       Microscopic not jose carlos remember -she declines anymore muscle relaxant at night. - d/w her to stop paroxtine again   - she felt amitriptyline is not better than paroxtein - so she switched after 1 week   - she also has n/v with meloxicam.   - she is doing stretching -     2.  Kn

## 2020-02-04 NOTE — PROGRESS NOTES
HPI:   Patient presents with:  Hypertension      Mamadou Arciniega is a 61year old female presenting for:  HTN/HLD  -Taking meds as prescribed-tolerating well without SEs.   -Denies CP, Palpitations, Dizziness, leg edema, SOB, LOC, Has    GERD-> stable    F Clear Clear    Spec Gravity 1.027 1.002 - 1.035    pH Urine 7.0 5.0 - 8.0    Protein Urine Negative Negative mg/dL    Glucose Urine Negative Negative mg/dL    Ketones Urine Negative Negative mg/dL    Bilirubin Urine Negative Negative    Blood Urine Negativ morning before breakfast. 90 tablet 3   • PARoxetine HCl 10 MG Oral Tab Take 1 tablet (10 mg total) by mouth every morning. 90 tablet 3   • Pravastatin Sodium 20 MG Oral Tab Take 1 tablet (20 mg total) by mouth nightly.  90 tablet 0   • Cyanocobalamin (B-12 Problems Paternal Aunt    • No Known Problems Maternal Cousin Female    • No Known Problems Maternal Cousin Male    • No Known Problems Paternal Cousin Female    • No Known Problems Paternal Cousin Male    • No Known Problems Other    • Breast Cancer Neg Patient is a 61year old female who presents primarily presents for:    Diagnoses and all orders for this visit:    Vision changes  -     OPHTHALMOLOGY - INTERNAL    Essential hypertension  -     Metoprolol Succinate ER 25 MG Oral Tablet 24 Hr;  Take 0.5 any questions, complications, allergies, or worsening or changing symptoms as well as any side effects or complications from the treatments . Red flags/ ER precautions discussed.     Meds & Refills for this Visit:  Requested Prescriptions     Signed Prescr

## 2020-02-28 ENCOUNTER — HOSPITAL ENCOUNTER (OUTPATIENT)
Age: 60
Discharge: HOME | End: 2020-02-28
Attending: EMERGENCY MEDICINE
Payer: MEDICARE

## 2020-02-28 VITALS
WEIGHT: 190 LBS | DIASTOLIC BLOOD PRESSURE: 64 MMHG | SYSTOLIC BLOOD PRESSURE: 142 MMHG | OXYGEN SATURATION: 96 % | HEART RATE: 60 BPM | BODY MASS INDEX: 33.66 KG/M2 | RESPIRATION RATE: 16 BRPM | TEMPERATURE: 97 F | HEIGHT: 63 IN

## 2020-02-28 DIAGNOSIS — J06.9 VIRAL URI WITH COUGH: Primary | ICD-10-CM

## 2020-02-28 DIAGNOSIS — I10 HYPERTENSION, UNSPECIFIED TYPE: ICD-10-CM

## 2020-02-28 LAB — S PYO AG THROAT QL: NEGATIVE

## 2020-02-28 PROCEDURE — 99213 OFFICE O/P EST LOW 20 MIN: CPT

## 2020-02-28 PROCEDURE — 87430 STREP A AG IA: CPT

## 2020-02-28 PROCEDURE — 99212 OFFICE O/P EST SF 10 MIN: CPT

## 2020-02-28 NOTE — ED PROVIDER NOTES
Patient Seen in: [facility] In [address] [place] [address]      History   Patient presents with:  Sore Throat    Stated Complaint: sore throat    HPI    The patient is a 60-year-old female with a past history of hypertension, fibromyalgia who presents Physical Exam    Constitutional: Well-developed well-nourished in no acute distress  Head: Normocephalic, no swelling or tenderness  Eyes: Nonicteric sclera, no conjunctival injection  ENT: TMs are clear and flat bilaterally.   There is mild posteri

## 2020-04-20 ENCOUNTER — TELEPHONE (OUTPATIENT)
Dept: FAMILY MEDICINE CLINIC | Facility: CLINIC | Age: 60
End: 2020-04-20

## 2020-04-20 DIAGNOSIS — E78.00 HYPERCHOLESTEREMIA: ICD-10-CM

## 2020-04-20 RX ORDER — PRAVASTATIN SODIUM 20 MG
TABLET ORAL
Qty: 90 TABLET | Refills: 0 | OUTPATIENT
Start: 2020-04-20

## 2020-04-20 RX ORDER — PRAVASTATIN SODIUM 20 MG
20 TABLET ORAL NIGHTLY
Qty: 30 TABLET | Refills: 0 | Status: SHIPPED | OUTPATIENT
Start: 2020-04-20 | End: 2020-10-06

## 2020-04-20 NOTE — TELEPHONE ENCOUNTER
Pt called requesting refills for Pravastatin Sodium 20 MG Oral Tab. Pt wants to be informed when this has been sent to pharmacy.

## 2020-07-06 ENCOUNTER — TELEPHONE (OUTPATIENT)
Dept: FAMILY MEDICINE CLINIC | Facility: CLINIC | Age: 60
End: 2020-07-06

## 2020-07-06 NOTE — TELEPHONE ENCOUNTER
Called patient to inform she is due for annual medicare physical. Patient I aware and stated she needs to get labs done first. She will call back to schedule an appointment once she gets labs done.  Also informed patient to call insurance to change pcp to D

## 2020-08-02 DIAGNOSIS — E78.00 HYPERCHOLESTEREMIA: ICD-10-CM

## 2020-08-03 RX ORDER — PRAVASTATIN SODIUM 20 MG
TABLET ORAL
Qty: 90 TABLET | Refills: 0 | OUTPATIENT
Start: 2020-08-03

## 2020-08-15 ENCOUNTER — APPOINTMENT (OUTPATIENT)
Dept: LAB | Facility: REFERENCE LAB | Age: 60
End: 2020-08-15
Attending: FAMILY MEDICINE
Payer: MEDICARE

## 2020-08-15 DIAGNOSIS — E78.00 HYPERCHOLESTEREMIA: ICD-10-CM

## 2020-08-15 DIAGNOSIS — Z00.00 HEALTHCARE MAINTENANCE: ICD-10-CM

## 2020-08-15 DIAGNOSIS — I10 ESSENTIAL HYPERTENSION: ICD-10-CM

## 2020-08-15 LAB
ALBUMIN SERPL-MCNC: 3.6 G/DL (ref 3.4–5)
ALBUMIN/GLOB SERPL: 0.8 {RATIO} (ref 1–2)
ALP LIVER SERPL-CCNC: 122 U/L (ref 46–118)
ALT SERPL-CCNC: 32 U/L (ref 13–56)
ANION GAP SERPL CALC-SCNC: 4 MMOL/L (ref 0–18)
AST SERPL-CCNC: 21 U/L (ref 15–37)
BACTERIA UR QL AUTO: NEGATIVE /HPF
BILIRUB SERPL-MCNC: 0.3 MG/DL (ref 0.1–2)
BILIRUB UR QL: NEGATIVE
BUN BLD-MCNC: 30 MG/DL (ref 7–18)
BUN/CREAT SERPL: 34.5 (ref 10–20)
CALCIUM BLD-MCNC: 8.7 MG/DL (ref 8.5–10.1)
CHLORIDE SERPL-SCNC: 111 MMOL/L (ref 98–112)
CHOLEST SMN-MCNC: 149 MG/DL (ref ?–200)
CLARITY UR: CLEAR
CO2 SERPL-SCNC: 28 MMOL/L (ref 21–32)
COLOR UR: YELLOW
CREAT BLD-MCNC: 0.87 MG/DL (ref 0.55–1.02)
DEPRECATED RDW RBC AUTO: 44.4 FL (ref 35.1–46.3)
ERYTHROCYTE [DISTWIDTH] IN BLOOD BY AUTOMATED COUNT: 13.3 % (ref 11–15)
EST. AVERAGE GLUCOSE BLD GHB EST-MCNC: 134 MG/DL (ref 68–126)
GLOBULIN PLAS-MCNC: 4.4 G/DL (ref 2.8–4.4)
GLUCOSE BLD-MCNC: 107 MG/DL (ref 70–99)
GLUCOSE UR-MCNC: NEGATIVE MG/DL
HBA1C MFR BLD HPLC: 6.3 % (ref ?–5.7)
HCT VFR BLD AUTO: 41.5 % (ref 35–48)
HDLC SERPL-MCNC: 42 MG/DL (ref 40–59)
HGB BLD-MCNC: 13.1 G/DL (ref 12–16)
HGB UR QL STRIP.AUTO: NEGATIVE
KETONES UR-MCNC: NEGATIVE MG/DL
LDLC SERPL CALC-MCNC: 84 MG/DL (ref ?–100)
LEUKOCYTE ESTERASE UR QL STRIP.AUTO: NEGATIVE
M PROTEIN MFR SERPL ELPH: 8 G/DL (ref 6.4–8.2)
MCH RBC QN AUTO: 28.9 PG (ref 26–34)
MCHC RBC AUTO-ENTMCNC: 31.6 G/DL (ref 31–37)
MCV RBC AUTO: 91.4 FL (ref 80–100)
NITRITE UR QL STRIP.AUTO: NEGATIVE
NONHDLC SERPL-MCNC: 107 MG/DL (ref ?–130)
OSMOLALITY SERPL CALC.SUM OF ELEC: 303 MOSM/KG (ref 275–295)
PATIENT FASTING Y/N/NP: YES
PATIENT FASTING Y/N/NP: YES
PH UR: 6 [PH] (ref 5–8)
PLATELET # BLD AUTO: 297 10(3)UL (ref 150–450)
POTASSIUM SERPL-SCNC: 4.2 MMOL/L (ref 3.5–5.1)
PROT UR-MCNC: NEGATIVE MG/DL
RBC # BLD AUTO: 4.54 X10(6)UL (ref 3.8–5.3)
RBC #/AREA URNS AUTO: 2 /HPF
SODIUM SERPL-SCNC: 143 MMOL/L (ref 136–145)
SP GR UR STRIP: 1.03 (ref 1–1.03)
TRIGL SERPL-MCNC: 116 MG/DL (ref 30–149)
TSI SER-ACNC: 2.75 MIU/ML (ref 0.36–3.74)
UROBILINOGEN UR STRIP-ACNC: <2
VLDLC SERPL CALC-MCNC: 23 MG/DL (ref 0–30)
WBC # BLD AUTO: 7.1 X10(3) UL (ref 4–11)
WBC #/AREA URNS AUTO: <1 /HPF

## 2020-08-15 PROCEDURE — 80053 COMPREHEN METABOLIC PANEL: CPT

## 2020-08-15 PROCEDURE — 83036 HEMOGLOBIN GLYCOSYLATED A1C: CPT

## 2020-08-15 PROCEDURE — 80061 LIPID PANEL: CPT

## 2020-08-15 PROCEDURE — 85027 COMPLETE CBC AUTOMATED: CPT

## 2020-08-15 PROCEDURE — 36415 COLL VENOUS BLD VENIPUNCTURE: CPT

## 2020-08-15 PROCEDURE — 81001 URINALYSIS AUTO W/SCOPE: CPT

## 2020-08-15 PROCEDURE — 84443 ASSAY THYROID STIM HORMONE: CPT

## 2020-08-18 ENCOUNTER — TELEPHONE (OUTPATIENT)
Dept: FAMILY MEDICINE CLINIC | Facility: CLINIC | Age: 60
End: 2020-08-18

## 2020-08-18 NOTE — TELEPHONE ENCOUNTER
----- Message from Stella De La Fuente MD sent at 8/17/2020 11:03 AM CDT -----  Please have pt make appt for physical to discuss results.  Has not been seen since 2/2020

## 2020-08-23 DIAGNOSIS — I10 ESSENTIAL HYPERTENSION: ICD-10-CM

## 2020-08-24 ENCOUNTER — APPOINTMENT (OUTPATIENT)
Dept: LAB | Facility: HOSPITAL | Age: 60
End: 2020-08-24
Attending: OBSTETRICS & GYNECOLOGY
Payer: MEDICARE

## 2020-08-24 ENCOUNTER — TELEPHONE (OUTPATIENT)
Dept: OBGYN CLINIC | Facility: CLINIC | Age: 60
End: 2020-08-24

## 2020-08-24 DIAGNOSIS — R31.9 HEMATURIA, UNSPECIFIED TYPE: Primary | ICD-10-CM

## 2020-08-24 LAB
BILIRUB UR QL: NEGATIVE
COLOR UR: YELLOW
GLUCOSE UR-MCNC: NEGATIVE MG/DL
KETONES UR-MCNC: NEGATIVE MG/DL
NITRITE UR QL STRIP.AUTO: NEGATIVE
PH UR: 5 [PH] (ref 5–8)
PROT UR-MCNC: NEGATIVE MG/DL
RBC #/AREA URNS AUTO: 7 /HPF
SP GR UR STRIP: 1.03 (ref 1–1.03)
UROBILINOGEN UR STRIP-ACNC: 2
WBC #/AREA URNS AUTO: 25 /HPF

## 2020-08-24 PROCEDURE — 81001 URINALYSIS AUTO W/SCOPE: CPT | Performed by: OBSTETRICS & GYNECOLOGY

## 2020-08-24 PROCEDURE — 87086 URINE CULTURE/COLONY COUNT: CPT | Performed by: OBSTETRICS & GYNECOLOGY

## 2020-08-24 RX ORDER — METOPROLOL SUCCINATE 25 MG/1
TABLET, EXTENDED RELEASE ORAL
Qty: 45 TABLET | Refills: 1 | Status: SHIPPED | OUTPATIENT
Start: 2020-08-24 | End: 2020-10-06

## 2020-08-24 NOTE — TELEPHONE ENCOUNTER
Pt reports blood in the toilet every time she urinates. Denies pain and burning with urination. States this started today. Pt is postmenopausal, states last period was about 7 years ago. Pt states she cannot say if bleeding is from urethra or vagina. Denies spotting. Advised pt to push hydration and go to lab for UA. Instructed pt on scheduling lab through [employer]. Advised pt to call tomorrow morning for result. Also assisted pt with scheduling annual visit.

## 2020-08-25 ENCOUNTER — TELEPHONE (OUTPATIENT)
Dept: OBGYN CLINIC | Facility: CLINIC | Age: 60
End: 2020-08-25

## 2020-08-25 RX ORDER — SULFAMETHOXAZOLE AND TRIMETHOPRIM 800; 160 MG/1; MG/1
1 TABLET ORAL 2 TIMES DAILY
Qty: 6 TABLET | Refills: 0 | Status: SHIPPED | OUTPATIENT
Start: 2020-08-25 | End: 2020-08-28

## 2020-08-25 NOTE — TELEPHONE ENCOUNTER
Used language line to call pt ( #[phone]). Pt informed of NJGs recs below and verbalized understanding. Bactrim sent to pharmacy.

## 2020-08-26 NOTE — TELEPHONE ENCOUNTER
To [facility]. Urine culture resulted: \"10-50,000 cfu/ml Multiple species present-probable contamination\". Sensitivity report does not apply.

## 2020-09-22 ENCOUNTER — OFFICE VISIT (OUTPATIENT)
Dept: OBGYN CLINIC | Facility: CLINIC | Age: 60
End: 2020-09-22
Payer: COMMERCIAL

## 2020-09-22 VITALS
DIASTOLIC BLOOD PRESSURE: 74 MMHG | SYSTOLIC BLOOD PRESSURE: 151 MMHG | WEIGHT: 191 LBS | HEART RATE: 60 BPM | BODY MASS INDEX: 36.53 KG/M2 | HEIGHT: 60.5 IN

## 2020-09-22 DIAGNOSIS — Z01.419 ENCOUNTER FOR GYNECOLOGICAL EXAMINATION WITHOUT ABNORMAL FINDING: Primary | ICD-10-CM

## 2020-09-22 DIAGNOSIS — Z12.31 VISIT FOR SCREENING MAMMOGRAM: ICD-10-CM

## 2020-09-22 PROCEDURE — 3008F BODY MASS INDEX DOCD: CPT | Performed by: OBSTETRICS & GYNECOLOGY

## 2020-09-22 PROCEDURE — 3077F SYST BP >= 140 MM HG: CPT | Performed by: OBSTETRICS & GYNECOLOGY

## 2020-09-22 PROCEDURE — 3078F DIAST BP <80 MM HG: CPT | Performed by: OBSTETRICS & GYNECOLOGY

## 2020-09-22 PROCEDURE — 99396 PREV VISIT EST AGE 40-64: CPT | Performed by: OBSTETRICS & GYNECOLOGY

## 2020-09-22 NOTE — PROGRESS NOTES
Ayanna Jacques is a 61year old female  No LMP recorded.  Patient is postmenopausal. Patient presents with:  Gyn Exam: ANNUAL EXAM  .no change in history    OBSTETRICS HISTORY:  OB History    Para Term  AB Living   2 2 2     2   SAB TAB Non-medical: Not on file    Tobacco Use      Smoking status: Never Smoker      Smokeless tobacco: Never Used    Substance and Sexual Activity      Alcohol use: No      Drug use: No      Sexual activity: Never    Lifestyle      Physical activity anesthetic: No    Social History Narrative      The patient uses the following assistive device(s):  quad cane. The patient does live in a home with stairs.       FAMILY HISTORY:  Family History   Problem Relation Age of Onset   • Hypertension Father MG Oral Tab, Take 1 tablet (10 mg total) by mouth every morning., Disp: 90 tablet, Rfl: 3  •  naproxen 500 MG Oral Tab, Take 1 tablet (500 mg total) by mouth 2 (two) times daily as needed. , Disp: 60 tablet, Rfl: 3  •  Cyanocobalamin (B-12 OR), Take 1 table tenderness on motion (+)  polyp  Uterus:    normal in size, contour, position, mobility, without tenderness  Adnexa:   normal without masses or tenderness  Perineum:   normal  Anus: no hemorroids     Assessment & Plan:  Marifer Heller was seen today for gyn exam.

## 2020-09-23 LAB — HPV I/H RISK 1 DNA SPEC QL NAA+PROBE: NEGATIVE

## 2020-09-25 LAB — LAST PAP RESULT: NORMAL

## 2020-10-06 ENCOUNTER — OFFICE VISIT (OUTPATIENT)
Dept: FAMILY MEDICINE CLINIC | Facility: CLINIC | Age: 60
End: 2020-10-06
Payer: COMMERCIAL

## 2020-10-06 VITALS
HEART RATE: 73 BPM | WEIGHT: 192 LBS | OXYGEN SATURATION: 97 % | HEIGHT: 60.5 IN | DIASTOLIC BLOOD PRESSURE: 70 MMHG | BODY MASS INDEX: 36.72 KG/M2 | SYSTOLIC BLOOD PRESSURE: 112 MMHG

## 2020-10-06 DIAGNOSIS — J84.10 CALCIFIED GRANULOMA OF LUNG (HCC): ICD-10-CM

## 2020-10-06 DIAGNOSIS — E78.00 HYPERCHOLESTEREMIA: ICD-10-CM

## 2020-10-06 DIAGNOSIS — Z00.00 ENCOUNTER FOR ANNUAL HEALTH EXAMINATION: Primary | ICD-10-CM

## 2020-10-06 DIAGNOSIS — Z98.890 S/P MEDIAL MENISCUS REPAIR OF LEFT KNEE: ICD-10-CM

## 2020-10-06 DIAGNOSIS — E66.01 CLASS 2 SEVERE OBESITY DUE TO EXCESS CALORIES WITH SERIOUS COMORBIDITY AND BODY MASS INDEX (BMI) OF 36.0 TO 36.9 IN ADULT (HCC): ICD-10-CM

## 2020-10-06 DIAGNOSIS — K64.8 INTERNAL HEMORRHOIDS: ICD-10-CM

## 2020-10-06 DIAGNOSIS — M41.80 DEXTROSCOLIOSIS: ICD-10-CM

## 2020-10-06 DIAGNOSIS — G47.61 PERIODIC LIMB MOVEMENT DISORDER (PLMD): ICD-10-CM

## 2020-10-06 DIAGNOSIS — Z28.21 INFLUENZA VACCINATION DECLINED BY PATIENT: ICD-10-CM

## 2020-10-06 DIAGNOSIS — H02.889 MGD (MEIBOMIAN GLAND DYSFUNCTION): ICD-10-CM

## 2020-10-06 DIAGNOSIS — K21.9 GASTROESOPHAGEAL REFLUX DISEASE WITHOUT ESOPHAGITIS: ICD-10-CM

## 2020-10-06 DIAGNOSIS — H25.13 AGE-RELATED NUCLEAR CATARACT OF BOTH EYES: ICD-10-CM

## 2020-10-06 DIAGNOSIS — E53.8 VITAMIN B12 DEFICIENCY: ICD-10-CM

## 2020-10-06 DIAGNOSIS — Z78.0 POSTMENOPAUSAL: ICD-10-CM

## 2020-10-06 DIAGNOSIS — G47.33 OSA (OBSTRUCTIVE SLEEP APNEA): ICD-10-CM

## 2020-10-06 DIAGNOSIS — H35.033 HYPERTENSIVE RETINOPATHY OF BOTH EYES: ICD-10-CM

## 2020-10-06 DIAGNOSIS — F32.4 MAJOR DEPRESSIVE DISORDER WITH SINGLE EPISODE, IN PARTIAL REMISSION (HCC): ICD-10-CM

## 2020-10-06 DIAGNOSIS — R73.03 PREDIABETES: ICD-10-CM

## 2020-10-06 DIAGNOSIS — I10 ESSENTIAL HYPERTENSION: ICD-10-CM

## 2020-10-06 DIAGNOSIS — M15.9 PRIMARY OSTEOARTHRITIS INVOLVING MULTIPLE JOINTS: ICD-10-CM

## 2020-10-06 DIAGNOSIS — R74.8 ELEVATED SERUM ALKALINE PHOSPHATASE LEVEL: ICD-10-CM

## 2020-10-06 DIAGNOSIS — M47.816 SPONDYLOSIS OF LUMBAR REGION WITHOUT MYELOPATHY OR RADICULOPATHY: ICD-10-CM

## 2020-10-06 DIAGNOSIS — Z79.82 LONG TERM CURRENT USE OF ASPIRIN: ICD-10-CM

## 2020-10-06 DIAGNOSIS — Z98.890 S/P LATERAL MENISCUS REPAIR OF LEFT KNEE: ICD-10-CM

## 2020-10-06 DIAGNOSIS — K44.9 HIATAL HERNIA: ICD-10-CM

## 2020-10-06 DIAGNOSIS — K80.20 CALCULUS OF GALLBLADDER WITHOUT CHOLECYSTITIS WITHOUT OBSTRUCTION: ICD-10-CM

## 2020-10-06 DIAGNOSIS — K76.0 HEPATIC STEATOSIS: ICD-10-CM

## 2020-10-06 DIAGNOSIS — K31.7 GASTRIC POLYPS: ICD-10-CM

## 2020-10-06 DIAGNOSIS — M79.7 FIBROMYALGIA: ICD-10-CM

## 2020-10-06 PROCEDURE — 3008F BODY MASS INDEX DOCD: CPT | Performed by: FAMILY MEDICINE

## 2020-10-06 PROCEDURE — G0439 PPPS, SUBSEQ VISIT: HCPCS | Performed by: FAMILY MEDICINE

## 2020-10-06 PROCEDURE — 96160 PT-FOCUSED HLTH RISK ASSMT: CPT | Performed by: FAMILY MEDICINE

## 2020-10-06 PROCEDURE — 99396 PREV VISIT EST AGE 40-64: CPT | Performed by: FAMILY MEDICINE

## 2020-10-06 PROCEDURE — 3074F SYST BP LT 130 MM HG: CPT | Performed by: FAMILY MEDICINE

## 2020-10-06 PROCEDURE — 3078F DIAST BP <80 MM HG: CPT | Performed by: FAMILY MEDICINE

## 2020-10-06 RX ORDER — METOPROLOL SUCCINATE 25 MG/1
12.5 TABLET, EXTENDED RELEASE ORAL DAILY
Qty: 45 TABLET | Refills: 0 | Status: SHIPPED | OUTPATIENT
Start: 2020-10-06 | End: 2021-04-06

## 2020-10-06 RX ORDER — PRAVASTATIN SODIUM 20 MG
20 TABLET ORAL NIGHTLY
Qty: 90 TABLET | Refills: 3 | Status: SHIPPED | OUTPATIENT
Start: 2020-10-06 | End: 2021-04-06

## 2020-10-27 ENCOUNTER — HOSPITAL ENCOUNTER (OUTPATIENT)
Dept: MAMMOGRAPHY | Facility: HOSPITAL | Age: 60
Discharge: HOME | End: 2020-10-27
Attending: OBSTETRICS & GYNECOLOGY
Payer: MEDICARE

## 2020-10-27 DIAGNOSIS — Z12.31 VISIT FOR SCREENING MAMMOGRAM: ICD-10-CM

## 2020-10-27 PROCEDURE — 77063 BREAST TOMOSYNTHESIS BI: CPT | Performed by: OBSTETRICS & GYNECOLOGY

## 2020-10-27 PROCEDURE — 77067 SCR MAMMO BI INCL CAD: CPT | Performed by: OBSTETRICS & GYNECOLOGY

## 2020-11-10 ENCOUNTER — OFFICE VISIT (OUTPATIENT)
Dept: RHEUMATOLOGY | Facility: CLINIC | Age: 60
End: 2020-11-10
Payer: COMMERCIAL

## 2020-11-10 VITALS
DIASTOLIC BLOOD PRESSURE: 74 MMHG | BODY MASS INDEX: 36.53 KG/M2 | SYSTOLIC BLOOD PRESSURE: 123 MMHG | HEIGHT: 60.5 IN | RESPIRATION RATE: 16 BRPM | WEIGHT: 191 LBS | HEART RATE: 64 BPM

## 2020-11-10 DIAGNOSIS — M25.562 CHRONIC PAIN OF LEFT KNEE: ICD-10-CM

## 2020-11-10 DIAGNOSIS — G89.29 CHRONIC PAIN OF LEFT KNEE: ICD-10-CM

## 2020-11-10 DIAGNOSIS — M79.7 FIBROMYALGIA: Primary | ICD-10-CM

## 2020-11-10 PROCEDURE — G0463 HOSPITAL OUTPT CLINIC VISIT: HCPCS | Performed by: INTERNAL MEDICINE

## 2020-11-10 PROCEDURE — 3008F BODY MASS INDEX DOCD: CPT | Performed by: INTERNAL MEDICINE

## 2020-11-10 PROCEDURE — 3078F DIAST BP <80 MM HG: CPT | Performed by: INTERNAL MEDICINE

## 2020-11-10 PROCEDURE — 99214 OFFICE O/P EST MOD 30 MIN: CPT | Performed by: INTERNAL MEDICINE

## 2020-11-10 PROCEDURE — 3074F SYST BP LT 130 MM HG: CPT | Performed by: INTERNAL MEDICINE

## 2020-11-10 NOTE — PROGRESS NOTES
Abeba Bejarano is a 61year old female who presents for Patient presents with:  Fibromyalgia  Shoulder Pain: Right, onset x3 days  . HPI:     I had the pleasure of seeing Abeba Bejarano on 7/6/2017 for evaluation.      She is a pleasant 64year old who has feeling ok with the gabpentin 300mg at night. She tapered from 400mg b/c it made her too dizzy. She has no drowsiness or dizziness. 10/8/2018  Her pain is 5/10 pain. She is doing well on gabpnetin 300mg at night.    She feels 9/10 pain in her left thu tablet 3   • Metoprolol Succinate ER 25 MG Oral Tablet 24 Hr Take 0.5 tablets (12.5 mg total) by mouth daily. 45 tablet 0   • aspirin (ASPIRIN 81) 81 MG Oral Tab EC Take 1 tablet (81 mg total) by mouth daily.  take 1 tablet (81MG)  by oral route  every day Brother    • No Known Problems Brother    • No Known Problems Sister    • No Known Problems Self    • No Known Problems Maternal Aunt    • No Known Problems Paternal Aunt    • No Known Problems Maternal Cousin Female    • No Known Problems Maternal Cousin no neck tendnerness, good ROM,   No rashes  CVS: RRR, no murmurs  RS: CTAB, no crackles, no rhonchi  ABD: Soft Non tender, no HSM felt, BS positive  Joint exam:   14/18 tender points   No synvoits noted    Tender in 1-5th mcps and pisps b/l in hands.    No 21   ALKALINE PHOSPHATASE      46 - 118 U/L  122 (H)   Total Bilirubin      0.1 - 2.0 mg/dL  0.3   TOTAL PROTEIN      6.4 - 8.2 g/dL  8.0   Albumin      3.4 - 5.0 g/dL  3.6   Globulin      2.8 - 4.4 g/dL  4.4   A/G Ratio      1.0 - 2.0  0.8 (L)   Patient F but can be seen with bursitis.     6/6/2017 - bilat knee xray - normal  ASSESSMENT AND PLAN:   Lester Alves is a 61year old female who presents for Patient presents with:  Fibromyalgia  Shoulder Pain: Right, onset x3 days      1.fibromyalgia -   Used span pain     Demarco Guerrero MD  11/10/2020   10:05 AM  - [place]ed [place]  through Epic

## 2020-11-10 NOTE — PATIENT INSTRUCTIONS
1. Cont. Gabapentin 300mg at night   2. Diclofenac 1 % gel -  For thumb and knee   3. Follow up with in 6 months. 4. Cont.naproxen 500mg daily - take with food as needed.    5. Suggest 1-2 pounds weight loss for knee pain

## 2020-11-22 PROBLEM — E66.01 CLASS 2 SEVERE OBESITY DUE TO EXCESS CALORIES WITH SERIOUS COMORBIDITY AND BODY MASS INDEX (BMI) OF 36.0 TO 36.9 IN ADULT: Status: ACTIVE | Noted: 2018-01-10

## 2020-11-22 PROBLEM — H10.13 ALLERGIC CONJUNCTIVITIS OF BOTH EYES: Status: RESOLVED | Noted: 2018-02-23 | Resolved: 2020-11-22

## 2020-11-22 PROBLEM — E66.812 CLASS 2 SEVERE OBESITY DUE TO EXCESS CALORIES WITH SERIOUS COMORBIDITY AND BODY MASS INDEX (BMI) OF 36.0 TO 36.9 IN ADULT (HCC): Status: ACTIVE | Noted: 2018-01-10

## 2020-11-22 PROBLEM — E66.01 CLASS 2 SEVERE OBESITY DUE TO EXCESS CALORIES WITH SERIOUS COMORBIDITY AND BODY MASS INDEX (BMI) OF 36.0 TO 36.9 IN ADULT  (HCC): Status: ACTIVE | Noted: 2018-01-10

## 2020-11-22 PROBLEM — E66.01 CLASS 2 SEVERE OBESITY DUE TO EXCESS CALORIES WITH SERIOUS COMORBIDITY AND BODY MASS INDEX (BMI) OF 36.0 TO 36.9 IN ADULT (HCC): Status: ACTIVE | Noted: 2018-01-10

## 2020-11-22 NOTE — PATIENT INSTRUCTIONS
Rosa Guillory's SCREENING SCHEDULE   Tests on this list are recommended by your physician but may not be covered, or covered at this frequency, by your insurer. Please check with your insurance carrier before scheduling to verify coverage.    PREVENTATIVE every 10 years- more often if abnormal Colonoscopy due on 09/08/2025 Update [facility] if applicable    Flex Sigmoidoscopy Screen  Covered every 5 years No results found for this or any previous visit. No flowsheet data found.      Fecal Occult Bloo or any previous visit. Please get once after your 65th birthday    Hepatitis B for Moderate/High Risk       No orders found for this or any previous visit.  Medium/high risk factors:   End-stage renal disease   Hemophiliacs who received Factor VIII or IX co

## 2020-11-22 NOTE — PROGRESS NOTES
HPI:   Amie Rodrigues is a 61year old female who presents for a Medicare Subsequent Annual Wellness visit (Pt already had Initial Annual Wellness).     No acute concerns    Annual Physical due on 10/06/2021        Fall/Risk Assessment   She has been scree had a score of 0 so is at low risk.     Patient Care Team: Patient Care Team:  Jenise Pond MD as PCP - General (Family Medicine)  Sherri López MD as Consulting Physician (Richie Paula)  Zara Prieto MD as Consulting Physician [facility] 116 08/15/2020          Last Chemistry Labs:   Lab Results   Component Value Date    AST 21 08/15/2020    ALT 32 08/15/2020    CA 8.7 08/15/2020    ALB 3.6 08/15/2020    TSH 2.750 08/15/2020    CREATSERUM 0.87 08/15/2020     (H) 08/15/2020        CB aunt, maternal cousin female, maternal cousin male, maternal grandmother, paternal aunt, paternal cousin female, paternal cousin male, self, sister, son, and another family member; Other in her maternal grandfather, paternal grandfather, and paternal grand reactive to light. Conjunctivae are normal.   Neck: Neck supple. No thyromegaly present. Cardiovascular: Normal rate, regular rhythm and normal heart sounds. No murmur heard. Edema not present.   Pulmonary/Chest: Effort normal and breath sounds sheyla D/Ca    Primary osteoarthritis involving multiple joints  S/P lateral meniscus repair of left knee  S/P medial meniscus repair of left knee  Fibromyalgia  Spondylosis of lumbar region without myelopathy or radiculopathy  Dextroscoliosis  -continue supporti Exercise  How would you describe your daily physical activity?: None  How would you describe your current health state?: Good  How do you maintain positive mental well-being?: Games      This section provided for quick review of chart, separate sheet to pa every year    Pneumococcal 13 (Prevnar)  Covered Once after 65 No vaccine history found Please get once after your 65th birthday    Pneumococcal 23 (Pneumovax)  Covered Once after 65 No vaccine history found Please get once after your 65th birthday    Dieudonne Dupree

## 2021-01-22 DIAGNOSIS — K21.9 GASTROESOPHAGEAL REFLUX DISEASE WITHOUT ESOPHAGITIS: ICD-10-CM

## 2021-01-23 RX ORDER — PANTOPRAZOLE SODIUM 40 MG/1
40 TABLET, DELAYED RELEASE ORAL
Qty: 90 TABLET | Refills: 0 | Status: SHIPPED | OUTPATIENT
Start: 2021-01-23 | End: 2021-04-06

## 2021-03-03 DIAGNOSIS — Z79.82 LONG TERM CURRENT USE OF ASPIRIN: ICD-10-CM

## 2021-03-05 RX ORDER — ASPIRIN 81 MG/1
81 TABLET ORAL DAILY
Qty: 30 TABLET | Refills: 0 | Status: SHIPPED | OUTPATIENT
Start: 2021-03-05 | End: 2021-03-29

## 2021-03-28 DIAGNOSIS — Z79.82 LONG TERM CURRENT USE OF ASPIRIN: ICD-10-CM

## 2021-03-29 RX ORDER — ASPIRIN 81 MG/1
TABLET ORAL
Qty: 30 TABLET | Refills: 0 | Status: SHIPPED | OUTPATIENT
Start: 2021-03-29 | End: 2021-04-26

## 2021-03-31 DIAGNOSIS — F32.4 MAJOR DEPRESSIVE DISORDER WITH SINGLE EPISODE, IN PARTIAL REMISSION (HCC): ICD-10-CM

## 2021-03-31 RX ORDER — PAROXETINE 10 MG/1
TABLET, FILM COATED ORAL
Qty: 90 TABLET | Refills: 3 | OUTPATIENT
Start: 2021-03-31

## 2021-04-06 ENCOUNTER — OFFICE VISIT (OUTPATIENT)
Dept: FAMILY MEDICINE CLINIC | Facility: CLINIC | Age: 61
End: 2021-04-06
Payer: COMMERCIAL

## 2021-04-06 VITALS
HEIGHT: 60.5 IN | WEIGHT: 194 LBS | BODY MASS INDEX: 37.11 KG/M2 | SYSTOLIC BLOOD PRESSURE: 128 MMHG | HEART RATE: 64 BPM | OXYGEN SATURATION: 96 % | DIASTOLIC BLOOD PRESSURE: 66 MMHG

## 2021-04-06 DIAGNOSIS — E78.00 HYPERCHOLESTEREMIA: Primary | ICD-10-CM

## 2021-04-06 DIAGNOSIS — I10 ESSENTIAL HYPERTENSION: ICD-10-CM

## 2021-04-06 DIAGNOSIS — F32.4 MAJOR DEPRESSIVE DISORDER WITH SINGLE EPISODE, IN PARTIAL REMISSION (HCC): ICD-10-CM

## 2021-04-06 DIAGNOSIS — M79.7 FIBROMYALGIA: ICD-10-CM

## 2021-04-06 DIAGNOSIS — K21.9 GASTROESOPHAGEAL REFLUX DISEASE WITHOUT ESOPHAGITIS: ICD-10-CM

## 2021-04-06 DIAGNOSIS — Z00.00 HEALTHCARE MAINTENANCE: ICD-10-CM

## 2021-04-06 PROCEDURE — 3008F BODY MASS INDEX DOCD: CPT | Performed by: FAMILY MEDICINE

## 2021-04-06 PROCEDURE — 99214 OFFICE O/P EST MOD 30 MIN: CPT | Performed by: FAMILY MEDICINE

## 2021-04-06 PROCEDURE — 3078F DIAST BP <80 MM HG: CPT | Performed by: FAMILY MEDICINE

## 2021-04-06 PROCEDURE — 3074F SYST BP LT 130 MM HG: CPT | Performed by: FAMILY MEDICINE

## 2021-04-06 RX ORDER — PANTOPRAZOLE SODIUM 40 MG/1
40 TABLET, DELAYED RELEASE ORAL
Qty: 90 TABLET | Refills: 1 | Status: SHIPPED | OUTPATIENT
Start: 2021-04-06 | End: 2021-07-20

## 2021-04-06 RX ORDER — PRAVASTATIN SODIUM 20 MG
20 TABLET ORAL NIGHTLY
Qty: 90 TABLET | Refills: 1 | Status: SHIPPED | OUTPATIENT
Start: 2021-04-06 | End: 2021-07-20

## 2021-04-06 RX ORDER — NAPROXEN 500 MG/1
500 TABLET ORAL 2 TIMES DAILY PRN
Qty: 60 TABLET | Refills: 3 | Status: CANCELLED | OUTPATIENT
Start: 2021-04-06

## 2021-04-06 RX ORDER — GABAPENTIN 300 MG/1
300 CAPSULE ORAL NIGHTLY
Qty: 90 CAPSULE | Refills: 3 | Status: SHIPPED | OUTPATIENT
Start: 2021-04-06 | End: 2022-01-31

## 2021-04-06 RX ORDER — METOPROLOL SUCCINATE 25 MG/1
12.5 TABLET, EXTENDED RELEASE ORAL DAILY
Qty: 45 TABLET | Refills: 1 | Status: SHIPPED | OUTPATIENT
Start: 2021-04-06 | End: 2021-07-12

## 2021-04-06 RX ORDER — PAROXETINE 10 MG/1
10 TABLET, FILM COATED ORAL EVERY MORNING
Qty: 90 TABLET | Refills: 3 | Status: SHIPPED | OUTPATIENT
Start: 2021-04-06 | End: 2022-01-31

## 2021-04-06 NOTE — PROGRESS NOTES
HPI:   Patient presents with:  Blood Pressure: blood pressure follow up       Lexi Flores is a 61year old female presenting for:  HTN/HLD  -Taking meds as prescribed-tolerating well without SEs.   -Denies CP, Palpitations, Dizziness, leg edema, SOB, L 09/23/2020 08:54 AM                                 [facility], [address],                                                                                [place]                                                                     First Screen:          [employer] OR) Take 1 tablet by mouth daily.          Past Medical History:   Diagnosis Date   • Anxiety state, unspecified    • Carpal tunnel syndrome on both sides    • Cataract    • Essential hypertension    • Fibromyalgia    • Hyperlipidemia    • Meibomian gland d Constitutional: Negative for fatigue and fever. Respiratory: Negative for cough and shortness of breath. Cardiovascular: Negative for chest pain, palpitations and leg swelling.    Gastrointestinal: Negative for vomiting, abdominal pain, diarrhea and tablets (12.5 mg total) by mouth daily. -stable; controlled  -continue meds  -watch salt intake, regular exercise, DASH/heart healthy eating  -monitor home BP regularly and maintain log; if elevated reading >160/100 notify Md.     Fibromyalgia  -     gabap nightly.        Orders Placed This Encounter      Lipid Panel [E]      CBC W Differential W Platelet [E]      Imaging & Consults:  None    Health Maintenance:  Influenza Vaccine(1) due on 09/01/2019  Annual Depression Screen due on 04/16/2020  Annual Physic

## 2021-04-09 ENCOUNTER — IMMUNIZATION (OUTPATIENT)
Dept: LAB | Age: 61
End: 2021-04-09
Attending: HOSPITALIST
Payer: MEDICARE

## 2021-04-09 DIAGNOSIS — Z23 NEED FOR VACCINATION: Primary | ICD-10-CM

## 2021-04-09 PROCEDURE — 0001A SARSCOV2 VAC 30MCG/0.3ML IM: CPT

## 2021-04-21 DIAGNOSIS — Z79.82 LONG TERM CURRENT USE OF ASPIRIN: ICD-10-CM

## 2021-04-26 RX ORDER — ASPIRIN 81 MG/1
TABLET, COATED ORAL
Qty: 30 TABLET | Refills: 0 | Status: SHIPPED | OUTPATIENT
Start: 2021-04-26 | End: 2021-05-19

## 2021-04-30 ENCOUNTER — IMMUNIZATION (OUTPATIENT)
Dept: LAB | Age: 61
End: 2021-04-30
Attending: HOSPITALIST
Payer: MEDICARE

## 2021-04-30 DIAGNOSIS — Z23 NEED FOR VACCINATION: Primary | ICD-10-CM

## 2021-04-30 PROCEDURE — 0002A SARSCOV2 VAC 30MCG/0.3ML IM: CPT

## 2021-05-19 DIAGNOSIS — Z79.82 LONG TERM CURRENT USE OF ASPIRIN: ICD-10-CM

## 2021-05-19 RX ORDER — ASPIRIN 81 MG/1
TABLET ORAL
Qty: 30 TABLET | Refills: 0 | Status: SHIPPED | OUTPATIENT
Start: 2021-05-19 | End: 2021-06-04

## 2021-06-04 DIAGNOSIS — Z79.82 LONG TERM CURRENT USE OF ASPIRIN: ICD-10-CM

## 2021-06-04 RX ORDER — ASPIRIN 81 MG/1
TABLET, COATED ORAL
Qty: 30 TABLET | Refills: 0 | Status: SHIPPED | OUTPATIENT
Start: 2021-06-04 | End: 2021-06-28

## 2021-06-27 DIAGNOSIS — Z79.82 LONG TERM CURRENT USE OF ASPIRIN: ICD-10-CM

## 2021-06-28 RX ORDER — ASPIRIN 81 MG/1
TABLET, COATED ORAL
Qty: 30 TABLET | Refills: 0 | Status: SHIPPED | OUTPATIENT
Start: 2021-06-28 | End: 2021-07-22

## 2021-07-06 ENCOUNTER — HOSPITAL ENCOUNTER (OUTPATIENT)
Dept: GENERAL RADIOLOGY | Facility: HOSPITAL | Age: 61
Discharge: HOME | End: 2021-07-06
Attending: INTERNAL MEDICINE
Payer: MEDICARE

## 2021-07-06 ENCOUNTER — OFFICE VISIT (OUTPATIENT)
Dept: RHEUMATOLOGY | Facility: CLINIC | Age: 61
End: 2021-07-06
Payer: COMMERCIAL

## 2021-07-06 VITALS
HEIGHT: 60.5 IN | WEIGHT: 195 LBS | SYSTOLIC BLOOD PRESSURE: 121 MMHG | RESPIRATION RATE: 16 BRPM | DIASTOLIC BLOOD PRESSURE: 70 MMHG | HEART RATE: 63 BPM | BODY MASS INDEX: 37.3 KG/M2

## 2021-07-06 DIAGNOSIS — M75.01 ADHESIVE CAPSULITIS OF RIGHT SHOULDER: ICD-10-CM

## 2021-07-06 DIAGNOSIS — M79.7 FIBROMYALGIA: Primary | ICD-10-CM

## 2021-07-06 PROCEDURE — 3074F SYST BP LT 130 MM HG: CPT | Performed by: INTERNAL MEDICINE

## 2021-07-06 PROCEDURE — 73030 X-RAY EXAM OF SHOULDER: CPT | Performed by: INTERNAL MEDICINE

## 2021-07-06 PROCEDURE — 3008F BODY MASS INDEX DOCD: CPT | Performed by: INTERNAL MEDICINE

## 2021-07-06 PROCEDURE — 3078F DIAST BP <80 MM HG: CPT | Performed by: INTERNAL MEDICINE

## 2021-07-06 PROCEDURE — 99214 OFFICE O/P EST MOD 30 MIN: CPT | Performed by: INTERNAL MEDICINE

## 2021-07-06 NOTE — PROGRESS NOTES
Sari Ahumada is a 61year old female who presents for Patient presents with:  Fibromyalgia  Shoulder Pain: Right, onset x3 months  Neck Pain: Right  . HPI:     I had the pleasure of seeing Sari Ahumada on 7/6/2017 for evaluation.      She is a pleasant pain.   She is feeling ok with the gabpentin 300mg at night. She tapered from 400mg b/c it made her too dizzy. She has no drowsiness or dizziness. 10/8/2018  Her pain is 5/10 pain. She is doing well on gabpnetin 300mg at night.    She feels 9/10 pain pain.   She's using the diclofenac gle ointmsne. Falling with her knee pain. Wt Readings from Last 2 Encounters:  07/06/21 : 195 lb (88.5 kg)  04/06/21 : 194 lb (88 kg)    Body mass index is 37.46 kg/m².       Current Outpatient Medications   Medica Known Problems Daughter    • No Known Problems Son    • No Known Problems Brother    • No Known Problems Brother    • No Known Problems Brother    • No Known Problems Sister    • No Known Problems Self    • No Known Problems Maternal Aunt    • No Known Pro lb (88.5 kg)   BMI 37.46 kg/m²   HEENT: Clear oropharynx, no oral ulcers, EOM intact, clear sclear, PERRLA, pleasant, no acute distress, no CAD, no neck tendnerness, good ROM,   No rashes  CVS: RRR, no murmurs  RS: CTAB, no crackles, no rhonchi  ABD: Soft 8.5 - 10.1 mg/dL  8.7   CALCULATED OSMOLALITY      275 - 295 mOsm/kg  303 (H)   eGFR NON-AFR.  AMERICAN      >=60  73   eGFR AFRICAN AMERICAN      >=60  84   ALT (SGPT)      13 - 56 U/L  32   AST (SGOT)      15 - 37 U/L  21   ALKALINE PHOSPHATASE      46 - posterior horn and body of medial meniscus. Stress changes within the medial compartment without fracture or dislocation. Tricompartmental chondral fissuring.      Anterior prepatellar soft tissue swelling is nonspecific but can be seen with bursiti 3/29/2019 - with dr. Valerio Andino  - volterne gel is hleping. 4. GERD on PPI - d/w her to take narpoxen as needed -w atch for gastirits. 5.Left thumb pain - use topical gel 1 % , , not trigger finger yet -      Summary:  1. Cont.  Gabapentin 300mg at nig

## 2021-07-06 NOTE — PATIENT INSTRUCTIONS
1. Cont. Gabapentin 300mg at night   2. Diclofenac 1 % gel -  For thumb and knee   3. Follow up with in 6 months. 4. Consider tyelnol 500mg 1-2 tablets a day -   5  Frozen shoulder - check right shoudler xray   6.  Physical therapy for right frozen should Margaret Katerina pulmonar y Shannonbrandon Echeverria tiroides [employer] [facility]. Se diagnostica mediante el examen físico y Shannon Seneca radiografía de la articulación del hombro. En ocasiones se obtienen imágenes mediante resonancia magnética (MRI) para buscar otras causas.   El tr ocurre:  · Se produce hinchazón o enrojecimiento del hombro  · Siente debilidad o entumecimiento del brazo  © 0236-9808 [employer] [address]. Todos los derechos reservados.  Esta información no pretende sustituir la atención médica profesional. Sólo moncada

## 2021-07-11 DIAGNOSIS — I10 ESSENTIAL HYPERTENSION: ICD-10-CM

## 2021-07-12 RX ORDER — METOPROLOL SUCCINATE 25 MG/1
12.5 TABLET, EXTENDED RELEASE ORAL DAILY
Qty: 45 TABLET | Refills: 0 | Status: SHIPPED | OUTPATIENT
Start: 2021-07-12 | End: 2021-07-20

## 2021-07-20 ENCOUNTER — OFFICE VISIT (OUTPATIENT)
Dept: FAMILY MEDICINE CLINIC | Facility: CLINIC | Age: 61
End: 2021-07-20
Payer: COMMERCIAL

## 2021-07-20 VITALS
SYSTOLIC BLOOD PRESSURE: 118 MMHG | BODY MASS INDEX: 37.3 KG/M2 | HEIGHT: 60.5 IN | WEIGHT: 195 LBS | DIASTOLIC BLOOD PRESSURE: 72 MMHG | OXYGEN SATURATION: 97 % | HEART RATE: 61 BPM

## 2021-07-20 DIAGNOSIS — G47.33 OSA (OBSTRUCTIVE SLEEP APNEA): ICD-10-CM

## 2021-07-20 DIAGNOSIS — K64.8 INTERNAL HEMORRHOIDS: ICD-10-CM

## 2021-07-20 DIAGNOSIS — H25.13 AGE-RELATED NUCLEAR CATARACT OF BOTH EYES: ICD-10-CM

## 2021-07-20 DIAGNOSIS — H02.889 MGD (MEIBOMIAN GLAND DYSFUNCTION): ICD-10-CM

## 2021-07-20 DIAGNOSIS — M15.9 PRIMARY OSTEOARTHRITIS INVOLVING MULTIPLE JOINTS: ICD-10-CM

## 2021-07-20 DIAGNOSIS — Z12.31 ENCOUNTER FOR SCREENING MAMMOGRAM FOR MALIGNANT NEOPLASM OF BREAST: ICD-10-CM

## 2021-07-20 DIAGNOSIS — M47.816 SPONDYLOSIS OF LUMBAR REGION WITHOUT MYELOPATHY OR RADICULOPATHY: ICD-10-CM

## 2021-07-20 DIAGNOSIS — Z98.890 S/P MEDIAL MENISCUS REPAIR OF LEFT KNEE: ICD-10-CM

## 2021-07-20 DIAGNOSIS — G47.61 PERIODIC LIMB MOVEMENT DISORDER (PLMD): ICD-10-CM

## 2021-07-20 DIAGNOSIS — Z00.00 HEALTHCARE MAINTENANCE: ICD-10-CM

## 2021-07-20 DIAGNOSIS — J84.10 CALCIFIED GRANULOMA OF LUNG (HCC): ICD-10-CM

## 2021-07-20 DIAGNOSIS — E78.00 HYPERCHOLESTEREMIA: ICD-10-CM

## 2021-07-20 DIAGNOSIS — K80.20 CALCULUS OF GALLBLADDER WITHOUT CHOLECYSTITIS WITHOUT OBSTRUCTION: ICD-10-CM

## 2021-07-20 DIAGNOSIS — Z79.82 LONG TERM CURRENT USE OF ASPIRIN: ICD-10-CM

## 2021-07-20 DIAGNOSIS — M79.7 FIBROMYALGIA: ICD-10-CM

## 2021-07-20 DIAGNOSIS — M41.80 DEXTROSCOLIOSIS: ICD-10-CM

## 2021-07-20 DIAGNOSIS — K31.7 GASTRIC POLYPS: ICD-10-CM

## 2021-07-20 DIAGNOSIS — K21.9 GASTROESOPHAGEAL REFLUX DISEASE WITHOUT ESOPHAGITIS: ICD-10-CM

## 2021-07-20 DIAGNOSIS — E53.8 VITAMIN B12 DEFICIENCY: ICD-10-CM

## 2021-07-20 DIAGNOSIS — K76.0 HEPATIC STEATOSIS: ICD-10-CM

## 2021-07-20 DIAGNOSIS — Z78.0 POSTMENOPAUSAL: ICD-10-CM

## 2021-07-20 DIAGNOSIS — Z00.00 ENCOUNTER FOR ANNUAL HEALTH EXAMINATION: Primary | ICD-10-CM

## 2021-07-20 DIAGNOSIS — R74.8 ELEVATED SERUM ALKALINE PHOSPHATASE LEVEL: ICD-10-CM

## 2021-07-20 DIAGNOSIS — H35.033 HYPERTENSIVE RETINOPATHY OF BOTH EYES: ICD-10-CM

## 2021-07-20 DIAGNOSIS — K44.9 HIATAL HERNIA: ICD-10-CM

## 2021-07-20 DIAGNOSIS — I10 ESSENTIAL HYPERTENSION: ICD-10-CM

## 2021-07-20 DIAGNOSIS — E66.01 CLASS 2 SEVERE OBESITY DUE TO EXCESS CALORIES WITH SERIOUS COMORBIDITY AND BODY MASS INDEX (BMI) OF 37.0 TO 37.9 IN ADULT (HCC): ICD-10-CM

## 2021-07-20 DIAGNOSIS — Z98.890 S/P LATERAL MENISCUS REPAIR OF LEFT KNEE: ICD-10-CM

## 2021-07-20 DIAGNOSIS — F32.4 MAJOR DEPRESSIVE DISORDER WITH SINGLE EPISODE, IN PARTIAL REMISSION (HCC): ICD-10-CM

## 2021-07-20 PROBLEM — Z28.21 INFLUENZA VACCINATION DECLINED BY PATIENT: Status: RESOLVED | Noted: 2018-10-16 | Resolved: 2021-07-20

## 2021-07-20 PROBLEM — E66.812 CLASS 2 SEVERE OBESITY DUE TO EXCESS CALORIES WITH SERIOUS COMORBIDITY AND BODY MASS INDEX (BMI) OF 37.0 TO 37.9 IN ADULT (HCC): Status: ACTIVE | Noted: 2018-01-10

## 2021-07-20 PROCEDURE — 3078F DIAST BP <80 MM HG: CPT | Performed by: FAMILY MEDICINE

## 2021-07-20 PROCEDURE — 99396 PREV VISIT EST AGE 40-64: CPT | Performed by: FAMILY MEDICINE

## 2021-07-20 PROCEDURE — 3008F BODY MASS INDEX DOCD: CPT | Performed by: FAMILY MEDICINE

## 2021-07-20 PROCEDURE — 96160 PT-FOCUSED HLTH RISK ASSMT: CPT | Performed by: FAMILY MEDICINE

## 2021-07-20 PROCEDURE — G0439 PPPS, SUBSEQ VISIT: HCPCS | Performed by: FAMILY MEDICINE

## 2021-07-20 PROCEDURE — 3074F SYST BP LT 130 MM HG: CPT | Performed by: FAMILY MEDICINE

## 2021-07-20 RX ORDER — PANTOPRAZOLE SODIUM 40 MG/1
40 TABLET, DELAYED RELEASE ORAL
Qty: 90 TABLET | Refills: 1 | Status: SHIPPED | OUTPATIENT
Start: 2021-07-20 | End: 2022-01-31

## 2021-07-20 RX ORDER — METOPROLOL SUCCINATE 25 MG/1
12.5 TABLET, EXTENDED RELEASE ORAL DAILY
Qty: 45 TABLET | Refills: 1 | Status: SHIPPED | OUTPATIENT
Start: 2021-07-20 | End: 2022-01-31

## 2021-07-20 RX ORDER — PRAVASTATIN SODIUM 20 MG
20 TABLET ORAL NIGHTLY
Qty: 90 TABLET | Refills: 1 | Status: SHIPPED | OUTPATIENT
Start: 2021-07-20 | End: 2022-01-31

## 2021-07-20 NOTE — PROGRESS NOTES
HPI:   Eron Hollingsworth is a 61year old female who presents for a Medicare Subsequent Annual Wellness visit (Pt already had Initial Annual Wellness). Chronic conditions stable  Recurrence of knee pain s/p surgical repair.     Annual Physical due on 10/0 patient in AVS     She does NOT have a Power of  for [employer] on file in [place].    Advance care planning including the explanation and discussion of advance directives standard forms performed Face to Face with patient and Family/surrogate (if pre myelopathy or radiculopathy     Calcified granuloma of lung (HCC)     Cholelithiasis     Hepatic steatosis    Wt Readings from Last 3 Encounters:  07/20/21 : 195 lb (88.5 kg)  07/06/21 : 195 lb (88.5 kg)  04/06/21 : 194 lb (88 kg)     Last Cholesterol Labs and sister; Heart Disease in her father; Hypertension in her father, mother, sister, and sister;  No Known Problems in her brother, brother, brother, daughter, maternal aunt, maternal cousin female, maternal cousin male, maternal grandmother, paternal aunt, supple. No thyromegaly present. Cardiovascular: Normal rate, regular rhythm and normal heart sounds. No murmur heard. Edema not present. Pulmonary/Chest: Effort normal and breath sounds normal. No respiratory distress. She has no wheezes.  She has n nightly. IZAIAH (obstructive sleep apnea)  Periodic limb movement disorder (PLMD)  Calcified granuloma of lung ([facility] [address]  -dx 2016. Not on CPAP as never had titration study. Did not f/u with referra given on 10/2020 to complete titration.  Strongly ad regards to diet and exercise. Diet assessment: good     PLAN:  The patient indicates understanding of these issues and agrees to the plan. Reinforced healthy diet, lifestyle, and exercise.     Return in about 6 months (around 1/20/2022) for Blood flowsheet data found. Bone Density Screening      Dexascan Every two years No results found for this or any previous visit. No flowsheet data found.     Pap and Pelvic      Pap: Every 3 yrs age 21-65 or Pap+HPV every 5 yrs age 33-67, age 72 and older a Value Date    CREATSERUM 0.87 08/15/2020         BUN Annually Lab Results   Component Value Date    BUN 30 (H) 08/15/2020       Drug Serum Conc Annually No results found for: DIGOXIN, DIG, VALP                      Template: [facility] AMB MEDICARE ANNUAL ASSESSM

## 2021-07-20 NOTE — PATIENT INSTRUCTIONS
Rosa Guillory's SCREENING SCHEDULE   Tests on this list are recommended by your physician but may not be covered, or covered at this frequency, by your insurer. Please check with your insurance carrier before scheduling to verify coverage.    PREVENTATI recommendations at this time   Pap and Pelvic    Pap   Covered every 2 years for women at normal risk;  Annually if at high risk 09/22/2020  Pap Smear,5 Years due on 09/22/2025    Chlamydia Annually if high risk -  No recommendations at this time   [employer] Public Health. This site has a lot of good information including definitions of the different types of Advance Directives.  It also has the State forms available on it's website for anyone to review and print using their home computer and printer. (the form

## 2021-07-21 DIAGNOSIS — Z79.82 LONG TERM CURRENT USE OF ASPIRIN: ICD-10-CM

## 2021-07-22 RX ORDER — ASPIRIN 81 MG/1
81 TABLET ORAL DAILY
Qty: 90 TABLET | Refills: 0 | Status: SHIPPED | OUTPATIENT
Start: 2021-07-22 | End: 2021-10-14

## 2021-08-16 ENCOUNTER — OFFICE VISIT (OUTPATIENT)
Dept: ORTHOPEDICS CLINIC | Facility: CLINIC | Age: 61
End: 2021-08-16
Payer: COMMERCIAL

## 2021-08-16 VITALS — SYSTOLIC BLOOD PRESSURE: 146 MMHG | BODY MASS INDEX: 37 KG/M2 | DIASTOLIC BLOOD PRESSURE: 71 MMHG | WEIGHT: 195 LBS

## 2021-08-16 DIAGNOSIS — M75.21 BICEPS TENDINITIS OF RIGHT SHOULDER: Primary | ICD-10-CM

## 2021-08-16 DIAGNOSIS — M75.81 ROTATOR CUFF TENDINITIS, RIGHT: ICD-10-CM

## 2021-08-16 PROCEDURE — 3077F SYST BP >= 140 MM HG: CPT | Performed by: ORTHOPAEDIC SURGERY

## 2021-08-16 PROCEDURE — 3078F DIAST BP <80 MM HG: CPT | Performed by: ORTHOPAEDIC SURGERY

## 2021-08-16 PROCEDURE — 20610 DRAIN/INJ JOINT/BURSA W/O US: CPT | Performed by: PHYSICIAN ASSISTANT

## 2021-08-16 PROCEDURE — 99214 OFFICE O/P EST MOD 30 MIN: CPT | Performed by: ORTHOPAEDIC SURGERY

## 2021-08-16 RX ORDER — TRIAMCINOLONE ACETONIDE 40 MG/ML
40 INJECTION, SUSPENSION INTRA-ARTICULAR; INTRAMUSCULAR ONCE
Status: COMPLETED | OUTPATIENT
Start: 2021-08-16 | End: 2021-08-16

## 2021-08-16 RX ADMIN — TRIAMCINOLONE ACETONIDE 40 MG: 40 INJECTION, SUSPENSION INTRA-ARTICULAR; INTRAMUSCULAR at 17:41:00

## 2021-08-16 NOTE — PROGRESS NOTES
Per verbal order from Dr. Lucho Quinn, draw up 5ml of 0.5% Marcaine and 1ml of Kenalog 40 for cortisone injection to the right shoulder.

## 2021-08-16 NOTE — H&P
NURSING INTAKE COMMENTS: Patient presents with:  Consult: right shoulder pain  is at a 7/10 now ,patient has limited ROM   x 7 months ,had an xray       HPI: This 61year old female presents today with her daughter who is providing [address] [place] [address] total) by mouth nightly. 90 capsule 3   • Cyanocobalamin (B-12 OR) Take 1 tablet by mouth daily.         No Known Allergies  Family History   Problem Relation Age of Onset   • Hypertension Father    • Heart Disease Father    • Hypertension Mother    • Bran Cole incontinence  MUSCULOSKELETAL: no other musculoskeletal complaints other than in HPI  NEURO: no numbness or tingling, no weakness or balance disorder  PSYCHE: no depression or anxiety  HEMATOLOGIC: no hx of blood dyscrasia, no Hx DVT/PE  ENDOCRINE: no thyr biceps tendinitis. Plan: Recommend cortisone injection today. Risks, benefits, alternatives were discussed. Patient understands gave verbal and written consent.   Right shoulder injection with 40 mg of Kenalog and 5 cc of Marcaine was injected uneventf

## 2021-08-16 NOTE — PROGRESS NOTES
Per verbal order from Dr. Phyllis Diaz, draw up 5ml of 0.5% Marcaine and 1ml of Kenalog 40 for cortisone injection to ,right shoulder Allyson Monday, [facility]  Patient provided education handout for cortisone injection.    Patient left office prior to obtaining post inje

## 2021-08-23 ENCOUNTER — TELEPHONE (OUTPATIENT)
Dept: PHYSICAL THERAPY | Age: 61
End: 2021-08-23

## 2021-09-01 ENCOUNTER — TELEPHONE (OUTPATIENT)
Dept: PHYSICAL THERAPY | Facility: HOSPITAL | Age: 61
End: 2021-09-01

## 2021-09-16 ENCOUNTER — OFFICE VISIT (OUTPATIENT)
Dept: PHYSICAL THERAPY | Facility: HOSPITAL | Age: 61
End: 2021-09-16
Attending: ORTHOPAEDIC SURGERY
Payer: MEDICARE

## 2021-09-16 DIAGNOSIS — M75.21 BICEPS TENDINITIS OF RIGHT SHOULDER: ICD-10-CM

## 2021-09-16 DIAGNOSIS — M75.81 ROTATOR CUFF TENDINITIS, RIGHT: ICD-10-CM

## 2021-09-16 PROCEDURE — 97110 THERAPEUTIC EXERCISES: CPT

## 2021-09-16 PROCEDURE — 97161 PT EVAL LOW COMPLEX 20 MIN: CPT

## 2021-09-16 NOTE — PROGRESS NOTES
SHOULDER EVALUATION:   Referring Physician: Dr. Isrrael Will  Diagnosis: Biceps tendinitis of right shoulder (M75.21)  Rotator cuff tendinitis, right (M75.81)        Date of Service: 9/16/2021     PATIENT SUMMARY   Ayanna Jacques is a 61year old R hand bi flexibility, and strength to achieve goals as outlined and promote functional task performance.   Functional deficits include but are not limited to  reachig behind back, reaching  overhead, reaching  out to the side, doing housework, dressing, and washing a HEP for: Quadruped rock backs, ball roll outs on table, ed re reaching with thumb up.      Charges: PT Eval Low Complexity, TEx1      Total Timed Treatment: 10 min     Total Treatment Time: 45 min     Based on clinical rationale and outcome measures, this you for your referral. Please co-sign or sign and return this letter via fax as soon as possible to [phone].  If you have any questions, please contact me at Dept: [phone]    Sincerely,  Electronically signed by therapist: Luis London PT  Phys

## 2021-09-23 ENCOUNTER — OFFICE VISIT (OUTPATIENT)
Dept: PHYSICAL THERAPY | Facility: HOSPITAL | Age: 61
End: 2021-09-23
Attending: ORTHOPAEDIC SURGERY
Payer: MEDICARE

## 2021-09-23 PROCEDURE — 97110 THERAPEUTIC EXERCISES: CPT

## 2021-09-23 PROCEDURE — 97140 MANUAL THERAPY 1/> REGIONS: CPT

## 2021-09-23 NOTE — PROGRESS NOTES
Dx: Biceps tendinitis of right shoulder (M75.21)  Rotator cuff tendinitis, right (M75.81)         Insurance (Authorized # of Visits):  U[employer] Medicare, no limit           Authorizing Physician: Dr. Isrrael Will  Next MD visit: none scheduled  Fall Risk: standard Progressing  Pt will improve shoulder strength throughout to 4/5 to improve functional  ADL.     Pt will demonstrate increased Serr Anterior and mid/low trap strength by 1/2 - 1 muscle grade  to promote improved shoulder      mechanics and stabilization wit x2;        Total Timed Treatment: 55 min  Total Treatment Time: 60 min

## 2021-09-29 ENCOUNTER — OFFICE VISIT (OUTPATIENT)
Dept: PHYSICAL THERAPY | Facility: HOSPITAL | Age: 61
End: 2021-09-29
Attending: ORTHOPAEDIC SURGERY
Payer: MEDICARE

## 2021-09-29 PROCEDURE — 97140 MANUAL THERAPY 1/> REGIONS: CPT

## 2021-09-29 PROCEDURE — 97110 THERAPEUTIC EXERCISES: CPT

## 2021-09-29 NOTE — PROGRESS NOTES
Dx: Biceps tendinitis of right shoulder (M75.21)  Rotator cuff tendinitis, right (M75.81)         Insurance (Authorized # of Visits):  UHC Medicare, no limit           Authorizing Physician: Dr. [facility] MD visit: none scheduled  Fall Risk: standard IR  Supine @ 90 deg Abd: 60 deg with mild anterior translation     Assessment: Pt progressing well with ROM compared to IE. Pt also improving with ability to sleep and perform and don/doff overhead blouse and wash hair.  Pt still having difficulty with h up:  see above  -Review of reaching with thumb up/avoiding shldr IR   -review HEP: Stop quadruped rock back due to LBP  -Ed with skeleton re AGMR of the shldr and need to keep the ball centered in the socket  -horizontal add with shldr ER 5 sec x5;   -stand roll ups on wall  -Ball roll outs on table HEP: HEP: HEP:            Charges: TE x2; MT x1;        Total Timed Treatment: 45 min  Total Treatment Time: 46 min

## 2021-10-04 ENCOUNTER — OFFICE VISIT (OUTPATIENT)
Dept: PHYSICAL THERAPY | Facility: HOSPITAL | Age: 61
End: 2021-10-04
Attending: ORTHOPAEDIC SURGERY
Payer: MEDICARE

## 2021-10-04 PROCEDURE — 97110 THERAPEUTIC EXERCISES: CPT

## 2021-10-07 ENCOUNTER — OFFICE VISIT (OUTPATIENT)
Dept: PHYSICAL THERAPY | Facility: HOSPITAL | Age: 61
End: 2021-10-07
Attending: ORTHOPAEDIC SURGERY
Payer: MEDICARE

## 2021-10-07 PROCEDURE — 97110 THERAPEUTIC EXERCISES: CPT

## 2021-10-07 NOTE — PROGRESS NOTES
Natalee  Pt has attended 5/8 visits in Physical Therapy.    Dx: Biceps tendinitis of right shoulder (M75.21)  Rotator cuff tendinitis, right (M75.81)         Insurance (Authorized # of Visits):  UHC Medicare, no limit           Authorizing Physic AROM to 90 deg to promote normalized ADL of reaching and fastening seatbelt. MET   Pt will increase shoulder IR AROM to 60 deg for functional ADL of tucking in shirt. MET  Pt will improve shoulder strength throughout to 4/5 to improve functional  ADL. thumb up: see above  -horizontal add with shldr ER 5 sec x5;   -Supine R shldr IR @90 deg abd with towel under elbow: monitor with opp hand over shldr to avoid anterior glides or pect activation;     -ed on sweeping and mopping with shoulder in neutral rat activation;   -Ball roll ups on wall  -Ball roll outs on table HEP:  -Robot ex  -\"Y\" ex facing wall  -horiz add with ER   -UE wall touchdowns with shldr elevation on inhalation;   -Supine R shldr IR @90 deg abd with towel under elbow: monitor with opp ha

## 2021-10-13 ENCOUNTER — APPOINTMENT (OUTPATIENT)
Dept: PHYSICAL THERAPY | Facility: HOSPITAL | Age: 61
End: 2021-10-13
Attending: ORTHOPAEDIC SURGERY
Payer: MEDICARE

## 2021-10-14 DIAGNOSIS — Z79.82 LONG TERM CURRENT USE OF ASPIRIN: ICD-10-CM

## 2021-10-14 RX ORDER — ASPIRIN 81 MG/1
TABLET, COATED ORAL
Qty: 90 TABLET | Refills: 0 | Status: SHIPPED | OUTPATIENT
Start: 2021-10-14

## 2021-10-25 ENCOUNTER — APPOINTMENT (OUTPATIENT)
Dept: PHYSICAL THERAPY | Facility: HOSPITAL | Age: 61
End: 2021-10-25
Attending: ORTHOPAEDIC SURGERY
Payer: MEDICARE

## 2021-10-27 ENCOUNTER — APPOINTMENT (OUTPATIENT)
Dept: PHYSICAL THERAPY | Facility: HOSPITAL | Age: 61
End: 2021-10-27
Attending: ORTHOPAEDIC SURGERY
Payer: MEDICARE

## 2021-11-06 ENCOUNTER — HOSPITAL ENCOUNTER (OUTPATIENT)
Dept: MAMMOGRAPHY | Facility: HOSPITAL | Age: 61
Discharge: HOME | End: 2021-11-06
Attending: FAMILY MEDICINE
Payer: MEDICARE

## 2021-11-06 DIAGNOSIS — Z12.31 ENCOUNTER FOR SCREENING MAMMOGRAM FOR MALIGNANT NEOPLASM OF BREAST: ICD-10-CM

## 2021-11-06 PROCEDURE — 77067 SCR MAMMO BI INCL CAD: CPT | Performed by: FAMILY MEDICINE

## 2021-11-06 PROCEDURE — 77063 BREAST TOMOSYNTHESIS BI: CPT | Performed by: FAMILY MEDICINE

## 2021-11-15 ENCOUNTER — APPOINTMENT (OUTPATIENT)
Dept: GENERAL RADIOLOGY | Age: 61
End: 2021-11-15
Attending: PHYSICIAN ASSISTANT
Payer: MEDICARE

## 2021-11-15 ENCOUNTER — HOSPITAL ENCOUNTER (OUTPATIENT)
Age: 61
Discharge: HOME | End: 2021-11-15
Attending: PHYSICIAN ASSISTANT
Payer: MEDICARE

## 2021-11-15 VITALS
OXYGEN SATURATION: 98 % | TEMPERATURE: 98 F | HEART RATE: 60 BPM | SYSTOLIC BLOOD PRESSURE: 162 MMHG | DIASTOLIC BLOOD PRESSURE: 63 MMHG | RESPIRATION RATE: 18 BRPM

## 2021-11-15 DIAGNOSIS — R05.9 COUGH: ICD-10-CM

## 2021-11-15 DIAGNOSIS — Z20.822 ENCOUNTER FOR LABORATORY TESTING FOR COVID-19 VIRUS: ICD-10-CM

## 2021-11-15 DIAGNOSIS — J02.9 ACUTE VIRAL PHARYNGITIS: Primary | ICD-10-CM

## 2021-11-15 DIAGNOSIS — R03.0 ELEVATED BLOOD PRESSURE READING: ICD-10-CM

## 2021-11-15 PROCEDURE — 71046 X-RAY EXAM CHEST 2 VIEWS: CPT | Performed by: PHYSICIAN ASSISTANT

## 2021-11-15 PROCEDURE — 87880 STREP A ASSAY W/OPTIC: CPT | Performed by: PHYSICIAN ASSISTANT

## 2021-11-15 PROCEDURE — 99213 OFFICE O/P EST LOW 20 MIN: CPT | Performed by: PHYSICIAN ASSISTANT

## 2021-11-15 PROCEDURE — U0002 COVID-19 LAB TEST NON-CDC: HCPCS | Performed by: PHYSICIAN ASSISTANT

## 2021-11-15 RX ORDER — CODEINE PHOSPHATE AND GUAIFENESIN 10; 100 MG/5ML; MG/5ML
5 SOLUTION ORAL EVERY 6 HOURS PRN
Qty: 50 ML | Refills: 0 | Status: SHIPPED | OUTPATIENT
Start: 2021-11-15 | End: 2022-01-31

## 2021-11-15 RX ORDER — BENZONATATE 100 MG/1
100 CAPSULE ORAL 3 TIMES DAILY PRN
Qty: 30 CAPSULE | Refills: 0 | Status: SHIPPED | OUTPATIENT
Start: 2021-11-15 | End: 2021-12-15

## 2021-11-15 RX ORDER — ACETAMINOPHEN 325 MG/1
650 TABLET ORAL
Qty: 40 TABLET | Refills: 0 | Status: SHIPPED | OUTPATIENT
Start: 2021-11-15 | End: 2022-01-31

## 2021-11-15 NOTE — ED PROVIDER NOTES
Patient Seen in: [facility]    History   Patient presents with:  Sore Throat    Stated Complaint: sorethroat/body aches/coughing/itchy throat/daughter&grandson strep+    HPI    79-year-old female presents with chief complaint of sore throat. EVERY DAY   metoprolol succinate 25 MG Oral Tablet 24 Hr,  Take 0.5 tablets (12.5 mg total) by mouth daily.    Pantoprazole Sodium 40 MG Oral Tab EC,  Take 1 tablet (40 mg total) by mouth every morning before breakfast.   Pravastatin Sodium 20 MG Oral Tab, HPI.  Constitutional and vital signs reviewed. All other systems reviewed and negative except as noted above. PSFH elements reviewed from today and agreed except as otherwise stated in HPI.     Physical Exam     ED Triage Vitals [11/15/21 0820]   BP rash.    DDX: strep vs. Viral pharyngitis vs. URI versus bronchitis versus pneumonia    ED Course     Labs Reviewed   POCT RAPID STREP - Normal   RAPID SARS-COV-2 BY PCR - Normal       MDM       XR CHEST PA + LAT CHEST (CPT=71046) (Final result)  Result ti care.  They were informed of the dangers of undiagnosed and untreated hypertension. Education regarding lifestyle modifications and the need for appropriate follow-up with their PCP to have their blood pressure re-checked within 24-48 hours was provided.

## 2021-11-24 ENCOUNTER — IMMUNIZATION (OUTPATIENT)
Dept: LAB | Facility: HOSPITAL | Age: 61
End: 2021-11-24
Attending: EMERGENCY MEDICINE
Payer: MEDICARE

## 2021-11-24 DIAGNOSIS — Z23 NEED FOR VACCINATION: Primary | ICD-10-CM

## 2021-11-24 PROCEDURE — 0004A SARSCOV2 VAC 30MCG/0.3ML IM: CPT

## 2022-01-31 ENCOUNTER — OFFICE VISIT (OUTPATIENT)
Dept: FAMILY MEDICINE CLINIC | Facility: CLINIC | Age: 62
End: 2022-01-31
Payer: COMMERCIAL

## 2022-01-31 VITALS
WEIGHT: 205 LBS | OXYGEN SATURATION: 96 % | TEMPERATURE: 98 F | HEART RATE: 69 BPM | BODY MASS INDEX: 39.21 KG/M2 | HEIGHT: 60.5 IN | DIASTOLIC BLOOD PRESSURE: 80 MMHG | SYSTOLIC BLOOD PRESSURE: 114 MMHG

## 2022-01-31 DIAGNOSIS — E78.00 HYPERCHOLESTEREMIA: ICD-10-CM

## 2022-01-31 DIAGNOSIS — F32.4 MAJOR DEPRESSIVE DISORDER WITH SINGLE EPISODE, IN PARTIAL REMISSION (HCC): ICD-10-CM

## 2022-01-31 DIAGNOSIS — K44.9 HIATAL HERNIA: ICD-10-CM

## 2022-01-31 DIAGNOSIS — I10 ESSENTIAL HYPERTENSION: ICD-10-CM

## 2022-01-31 DIAGNOSIS — K21.9 GASTROESOPHAGEAL REFLUX DISEASE WITHOUT ESOPHAGITIS: ICD-10-CM

## 2022-01-31 DIAGNOSIS — M79.7 FIBROMYALGIA: ICD-10-CM

## 2022-01-31 PROCEDURE — 3074F SYST BP LT 130 MM HG: CPT | Performed by: FAMILY MEDICINE

## 2022-01-31 PROCEDURE — 3079F DIAST BP 80-89 MM HG: CPT | Performed by: FAMILY MEDICINE

## 2022-01-31 PROCEDURE — 3008F BODY MASS INDEX DOCD: CPT | Performed by: FAMILY MEDICINE

## 2022-01-31 PROCEDURE — 99214 OFFICE O/P EST MOD 30 MIN: CPT | Performed by: FAMILY MEDICINE

## 2022-01-31 RX ORDER — PRAVASTATIN SODIUM 20 MG
20 TABLET ORAL NIGHTLY
Qty: 90 TABLET | Refills: 1 | Status: SHIPPED | OUTPATIENT
Start: 2022-01-31

## 2022-01-31 RX ORDER — PAROXETINE 10 MG/1
10 TABLET, FILM COATED ORAL EVERY MORNING
Qty: 90 TABLET | Refills: 3 | Status: SHIPPED | OUTPATIENT
Start: 2022-01-31

## 2022-01-31 RX ORDER — PANTOPRAZOLE SODIUM 40 MG/1
40 TABLET, DELAYED RELEASE ORAL
Qty: 90 TABLET | Refills: 1 | Status: SHIPPED | OUTPATIENT
Start: 2022-01-31

## 2022-01-31 RX ORDER — METOPROLOL SUCCINATE 25 MG/1
12.5 TABLET, EXTENDED RELEASE ORAL DAILY
Qty: 45 TABLET | Refills: 1 | Status: SHIPPED | OUTPATIENT
Start: 2022-01-31

## 2022-01-31 RX ORDER — GABAPENTIN 300 MG/1
300 CAPSULE ORAL NIGHTLY
Qty: 90 CAPSULE | Refills: 3 | Status: SHIPPED | OUTPATIENT
Start: 2022-01-31

## 2022-01-31 NOTE — PROGRESS NOTES
HPI:   Patient presents with:  Blood Pressure      Yoselin Coppola is a 64year old female presenting for:  HTN/HLD  -Taking meds as prescribed-tolerating well without SEs.   -Denies CP, Palpitations, Dizziness, leg edema, SOB, LOC, Has    GERD-> stable total) by mouth nightly. 90 tablet 1   • ASPIRIN LOW DOSE 81 MG Oral Tab EC TAKE 1 TABLET BY MOUTH EVERY DAY 90 tablet 0   • Cyanocobalamin (B-12 OR) Take 1 tablet by mouth daily.          Past Medical History:   Diagnosis Date   • Anxiety state, unspecifie Neg    • DCIS Neg    • LCIS Neg    • BRCA gene + Neg    • Ashkenazi Protestant Descent Neg           REVIEW OF SYSTEMS:   Review of Systems   Constitutional: Negative for fatigue and fever. Respiratory: Negative for cough and shortness of breath.     Osmin Luong nightly. Major depressive disorder with single episode, in partial remission (HCC)  -     PARoxetine 10 MG Oral Tab; Take 1 tablet (10 mg total) by mouth every morning.     Gastroesophageal reflux disease without esophagitis  Hiatal hernia  -     pantopr Maintenance:  Influenza Vaccine(1) due on 09/01/2019  Annual Depression Screen due on 04/16/2020  Annual Physical due on 04/23/2020  Mammogram due on 06/01/2020  Pap Smear,5 Years due on 12/08/2020  Colonoscopy due on 09/08/2025      Dave Fail

## 2022-03-07 RX ORDER — ASPIRIN 81 MG/1
TABLET ORAL
Qty: 90 TABLET | Refills: 0 | Status: SHIPPED | OUTPATIENT
Start: 2022-03-07

## 2022-04-23 ENCOUNTER — IMMUNIZATION (OUTPATIENT)
Dept: LAB | Age: 62
End: 2022-04-23
Attending: EMERGENCY MEDICINE
Payer: MEDICARE

## 2022-04-23 DIAGNOSIS — Z23 NEED FOR VACCINATION: Primary | ICD-10-CM

## 2022-04-23 PROCEDURE — 0054A SARSCOV2 VAC 30MCG TRS SUCR: CPT

## 2022-05-29 DIAGNOSIS — Z79.82 LONG TERM CURRENT USE OF ASPIRIN: ICD-10-CM

## 2022-05-31 RX ORDER — ASPIRIN 81 MG/1
TABLET, COATED ORAL
Qty: 90 TABLET | Refills: 0 | Status: SHIPPED | OUTPATIENT
Start: 2022-05-31

## 2022-07-16 ENCOUNTER — LAB ENCOUNTER (OUTPATIENT)
Dept: LAB | Facility: HOSPITAL | Age: 62
End: 2022-07-16
Attending: FAMILY MEDICINE
Payer: MEDICARE

## 2022-07-16 LAB
ALBUMIN SERPL-MCNC: 3.4 G/DL (ref 3.4–5)
ALBUMIN/GLOB SERPL: 0.8 {RATIO} (ref 1–2)
ALP LIVER SERPL-CCNC: 130 U/L
ALT SERPL-CCNC: 43 U/L
ANION GAP SERPL CALC-SCNC: 8 MMOL/L (ref 0–18)
AST SERPL-CCNC: 24 U/L (ref 15–37)
BASOPHILS # BLD AUTO: 0.03 X10(3) UL (ref 0–0.2)
BASOPHILS NFR BLD AUTO: 0.4 %
BILIRUB SERPL-MCNC: 0.4 MG/DL (ref 0.1–2)
BUN BLD-MCNC: 14 MG/DL (ref 7–18)
BUN/CREAT SERPL: 19.4 (ref 10–20)
CALCIUM BLD-MCNC: 8.7 MG/DL (ref 8.5–10.1)
CHLORIDE SERPL-SCNC: 105 MMOL/L (ref 98–112)
CHOLEST SERPL-MCNC: 157 MG/DL (ref ?–200)
CO2 SERPL-SCNC: 28 MMOL/L (ref 21–32)
CREAT BLD-MCNC: 0.72 MG/DL
DEPRECATED RDW RBC AUTO: 43.2 FL (ref 35.1–46.3)
EOSINOPHIL # BLD AUTO: 0.3 X10(3) UL (ref 0–0.7)
EOSINOPHIL NFR BLD AUTO: 3.7 %
ERYTHROCYTE [DISTWIDTH] IN BLOOD BY AUTOMATED COUNT: 13.1 % (ref 11–15)
EST. AVERAGE GLUCOSE BLD GHB EST-MCNC: 151 MG/DL (ref 68–126)
FASTING PATIENT LIPID ANSWER: YES
FASTING STATUS PATIENT QL REPORTED: YES
GLOBULIN PLAS-MCNC: 4.2 G/DL (ref 2.8–4.4)
GLUCOSE BLD-MCNC: 135 MG/DL (ref 70–99)
HBA1C MFR BLD: 6.9 % (ref ?–5.7)
HCT VFR BLD AUTO: 42 %
HDLC SERPL-MCNC: 44 MG/DL (ref 40–59)
HGB BLD-MCNC: 13.2 G/DL
IMM GRANULOCYTES # BLD AUTO: 0.03 X10(3) UL (ref 0–1)
IMM GRANULOCYTES NFR BLD: 0.4 %
LDLC SERPL CALC-MCNC: 88 MG/DL (ref ?–100)
LYMPHOCYTES # BLD AUTO: 2.41 X10(3) UL (ref 1–4)
LYMPHOCYTES NFR BLD AUTO: 29.6 %
MCH RBC QN AUTO: 28.7 PG (ref 26–34)
MCHC RBC AUTO-ENTMCNC: 31.4 G/DL (ref 31–37)
MCV RBC AUTO: 91.3 FL
MONOCYTES # BLD AUTO: 0.77 X10(3) UL (ref 0.1–1)
MONOCYTES NFR BLD AUTO: 9.5 %
NEUTROPHILS # BLD AUTO: 4.6 X10 (3) UL (ref 1.5–7.7)
NEUTROPHILS # BLD AUTO: 4.6 X10(3) UL (ref 1.5–7.7)
NEUTROPHILS NFR BLD AUTO: 56.4 %
NONHDLC SERPL-MCNC: 113 MG/DL (ref ?–130)
OSMOLALITY SERPL CALC.SUM OF ELEC: 295 MOSM/KG (ref 275–295)
PLATELET # BLD AUTO: 316 10(3)UL (ref 150–450)
POTASSIUM SERPL-SCNC: 4.2 MMOL/L (ref 3.5–5.1)
PROT SERPL-MCNC: 7.6 G/DL (ref 6.4–8.2)
RBC # BLD AUTO: 4.6 X10(6)UL
SODIUM SERPL-SCNC: 141 MMOL/L (ref 136–145)
TRIGL SERPL-MCNC: 145 MG/DL (ref 30–149)
TSI SER-ACNC: 2.74 MIU/ML (ref 0.36–3.74)
VIT B12 SERPL-MCNC: 961 PG/ML (ref 193–986)
VIT D+METAB SERPL-MCNC: 19.2 NG/ML (ref 30–100)
VLDLC SERPL CALC-MCNC: 23 MG/DL (ref 0–30)
WBC # BLD AUTO: 8.1 X10(3) UL (ref 4–11)

## 2022-07-16 PROCEDURE — 80061 LIPID PANEL: CPT | Performed by: FAMILY MEDICINE

## 2022-07-16 PROCEDURE — 83036 HEMOGLOBIN GLYCOSYLATED A1C: CPT | Performed by: FAMILY MEDICINE

## 2022-07-16 PROCEDURE — 80053 COMPREHEN METABOLIC PANEL: CPT | Performed by: FAMILY MEDICINE

## 2022-07-16 PROCEDURE — 85025 COMPLETE CBC W/AUTO DIFF WBC: CPT | Performed by: FAMILY MEDICINE

## 2022-07-16 PROCEDURE — 82607 VITAMIN B-12: CPT | Performed by: FAMILY MEDICINE

## 2022-07-16 PROCEDURE — 82306 VITAMIN D 25 HYDROXY: CPT | Performed by: FAMILY MEDICINE

## 2022-07-16 PROCEDURE — 84443 ASSAY THYROID STIM HORMONE: CPT | Performed by: FAMILY MEDICINE

## 2022-07-16 PROCEDURE — 36415 COLL VENOUS BLD VENIPUNCTURE: CPT | Performed by: FAMILY MEDICINE

## 2022-08-01 ENCOUNTER — OFFICE VISIT (OUTPATIENT)
Dept: INTERNAL MEDICINE CLINIC | Facility: CLINIC | Age: 62
End: 2022-08-01
Payer: COMMERCIAL

## 2022-08-01 VITALS
OXYGEN SATURATION: 97 % | HEIGHT: 60 IN | BODY MASS INDEX: 38.87 KG/M2 | HEART RATE: 68 BPM | WEIGHT: 198 LBS | DIASTOLIC BLOOD PRESSURE: 80 MMHG | SYSTOLIC BLOOD PRESSURE: 122 MMHG | TEMPERATURE: 98 F

## 2022-08-01 DIAGNOSIS — Z00.00 ENCOUNTER FOR ANNUAL HEALTH EXAMINATION: Primary | ICD-10-CM

## 2022-08-01 DIAGNOSIS — R73.03 PREDIABETES: ICD-10-CM

## 2022-08-01 DIAGNOSIS — E53.8 VITAMIN B12 DEFICIENCY: ICD-10-CM

## 2022-08-01 DIAGNOSIS — E66.01 CLASS 2 SEVERE OBESITY DUE TO EXCESS CALORIES WITH SERIOUS COMORBIDITY AND BODY MASS INDEX (BMI) OF 37.0 TO 37.9 IN ADULT (HCC): ICD-10-CM

## 2022-08-01 DIAGNOSIS — I10 ESSENTIAL HYPERTENSION: ICD-10-CM

## 2022-08-01 DIAGNOSIS — H02.889 MGD (MEIBOMIAN GLAND DYSFUNCTION): ICD-10-CM

## 2022-08-01 DIAGNOSIS — Z98.890 S/P LATERAL MENISCUS REPAIR OF LEFT KNEE: ICD-10-CM

## 2022-08-01 DIAGNOSIS — H25.13 AGE-RELATED NUCLEAR CATARACT OF BOTH EYES: ICD-10-CM

## 2022-08-01 DIAGNOSIS — M41.80 DEXTROSCOLIOSIS: ICD-10-CM

## 2022-08-01 DIAGNOSIS — M15.9 PRIMARY OSTEOARTHRITIS INVOLVING MULTIPLE JOINTS: ICD-10-CM

## 2022-08-01 DIAGNOSIS — H35.033 HYPERTENSIVE RETINOPATHY OF BOTH EYES: ICD-10-CM

## 2022-08-01 DIAGNOSIS — G47.33 OSA (OBSTRUCTIVE SLEEP APNEA): ICD-10-CM

## 2022-08-01 DIAGNOSIS — Z98.890 S/P MEDIAL MENISCUS REPAIR OF LEFT KNEE: ICD-10-CM

## 2022-08-01 DIAGNOSIS — K31.7 GASTRIC POLYPS: ICD-10-CM

## 2022-08-01 DIAGNOSIS — E78.00 HYPERCHOLESTEREMIA: ICD-10-CM

## 2022-08-01 DIAGNOSIS — K21.9 GASTROESOPHAGEAL REFLUX DISEASE WITHOUT ESOPHAGITIS: ICD-10-CM

## 2022-08-01 DIAGNOSIS — G47.61 PERIODIC LIMB MOVEMENT DISORDER (PLMD): ICD-10-CM

## 2022-08-01 DIAGNOSIS — M47.816 SPONDYLOSIS OF LUMBAR REGION WITHOUT MYELOPATHY OR RADICULOPATHY: ICD-10-CM

## 2022-08-01 DIAGNOSIS — Z79.82 LONG TERM CURRENT USE OF ASPIRIN: ICD-10-CM

## 2022-08-01 DIAGNOSIS — Z12.31 ENCOUNTER FOR SCREENING MAMMOGRAM FOR MALIGNANT NEOPLASM OF BREAST: ICD-10-CM

## 2022-08-01 DIAGNOSIS — K64.8 INTERNAL HEMORRHOIDS: ICD-10-CM

## 2022-08-01 DIAGNOSIS — K76.0 HEPATIC STEATOSIS: ICD-10-CM

## 2022-08-01 DIAGNOSIS — F32.4 MAJOR DEPRESSIVE DISORDER WITH SINGLE EPISODE, IN PARTIAL REMISSION (HCC): ICD-10-CM

## 2022-08-01 DIAGNOSIS — M79.7 FIBROMYALGIA: ICD-10-CM

## 2022-08-01 DIAGNOSIS — Z78.0 POSTMENOPAUSAL: ICD-10-CM

## 2022-08-01 DIAGNOSIS — K44.9 HIATAL HERNIA: ICD-10-CM

## 2022-08-01 DIAGNOSIS — K80.20 CALCULUS OF GALLBLADDER WITHOUT CHOLECYSTITIS WITHOUT OBSTRUCTION: ICD-10-CM

## 2022-08-01 PROCEDURE — 3008F BODY MASS INDEX DOCD: CPT | Performed by: FAMILY MEDICINE

## 2022-08-01 PROCEDURE — 3079F DIAST BP 80-89 MM HG: CPT | Performed by: FAMILY MEDICINE

## 2022-08-01 PROCEDURE — 99396 PREV VISIT EST AGE 40-64: CPT | Performed by: FAMILY MEDICINE

## 2022-08-01 PROCEDURE — 96160 PT-FOCUSED HLTH RISK ASSMT: CPT | Performed by: FAMILY MEDICINE

## 2022-08-01 PROCEDURE — G0439 PPPS, SUBSEQ VISIT: HCPCS | Performed by: FAMILY MEDICINE

## 2022-08-01 PROCEDURE — 3074F SYST BP LT 130 MM HG: CPT | Performed by: FAMILY MEDICINE

## 2022-08-01 RX ORDER — PANTOPRAZOLE SODIUM 40 MG/1
40 TABLET, DELAYED RELEASE ORAL
Qty: 90 TABLET | Refills: 1 | Status: SHIPPED | OUTPATIENT
Start: 2022-08-01

## 2022-08-01 RX ORDER — PRAVASTATIN SODIUM 20 MG
20 TABLET ORAL NIGHTLY
Qty: 90 TABLET | Refills: 1 | Status: SHIPPED | OUTPATIENT
Start: 2022-08-01

## 2022-08-01 RX ORDER — METOPROLOL SUCCINATE 25 MG/1
12.5 TABLET, EXTENDED RELEASE ORAL DAILY
Qty: 45 TABLET | Refills: 1 | Status: SHIPPED | OUTPATIENT
Start: 2022-08-01

## 2022-08-29 DIAGNOSIS — Z79.82 LONG TERM CURRENT USE OF ASPIRIN: ICD-10-CM

## 2022-08-29 RX ORDER — ASPIRIN 81 MG/1
TABLET, COATED ORAL
Qty: 90 TABLET | Refills: 0 | Status: SHIPPED | OUTPATIENT
Start: 2022-08-29

## 2022-09-26 ENCOUNTER — TELEPHONE (OUTPATIENT)
Dept: RHEUMATOLOGY | Facility: CLINIC | Age: 62
End: 2022-09-26

## 2022-09-26 ENCOUNTER — OFFICE VISIT (OUTPATIENT)
Dept: RHEUMATOLOGY | Facility: CLINIC | Age: 62
End: 2022-09-26

## 2022-09-26 VITALS
RESPIRATION RATE: 16 BRPM | BODY MASS INDEX: 37.5 KG/M2 | HEIGHT: 60 IN | WEIGHT: 191 LBS | SYSTOLIC BLOOD PRESSURE: 135 MMHG | HEART RATE: 59 BPM | DIASTOLIC BLOOD PRESSURE: 64 MMHG

## 2022-09-26 DIAGNOSIS — M79.7 FIBROMYALGIA: Primary | ICD-10-CM

## 2022-09-26 DIAGNOSIS — M17.11 PRIMARY OSTEOARTHRITIS OF RIGHT KNEE: ICD-10-CM

## 2022-09-26 PROCEDURE — 3078F DIAST BP <80 MM HG: CPT | Performed by: INTERNAL MEDICINE

## 2022-09-26 PROCEDURE — 3075F SYST BP GE 130 - 139MM HG: CPT | Performed by: INTERNAL MEDICINE

## 2022-09-26 PROCEDURE — 3008F BODY MASS INDEX DOCD: CPT | Performed by: INTERNAL MEDICINE

## 2022-09-26 PROCEDURE — 99214 OFFICE O/P EST MOD 30 MIN: CPT | Performed by: INTERNAL MEDICINE

## 2022-09-26 NOTE — PATIENT INSTRUCTIONS
1. Cont. Gabapentin 300mg at night   2. Diclofenac 1 % gel - or VOLTAREN GEL 1% for right knee - upt four times a day   3. Will get authorization for GEL/ hyaluronic acid injection for right knee   4. Consider tyelnol 500-650mg 1-2 tablets a day -   AND /OR ibuprofen 200mg two to three times a day as needed. 5  Check right knee xray   6. Physical therapy for right knee   7.return to clinic in 2 -3 weeks.

## 2022-09-29 NOTE — TELEPHONE ENCOUNTER
Dr. Caruso Roles, when would you like for us to schedule patient for Syn[employer]? Called Insurance at [phone] to check if PA needed. Spoke with Carmen CHOUDHURY  She said  is valid [employer] and Bill  No prior authorization  Reference # [id number]

## 2022-10-03 ENCOUNTER — TELEPHONE (OUTPATIENT)
Dept: INTERNAL MEDICINE CLINIC | Facility: CLINIC | Age: 62
End: 2022-10-03

## 2022-10-03 ENCOUNTER — HOSPITAL ENCOUNTER (OUTPATIENT)
Dept: GENERAL RADIOLOGY | Facility: HOSPITAL | Age: 62
Discharge: HOME | End: 2022-10-03
Attending: INTERNAL MEDICINE
Payer: MEDICARE

## 2022-10-03 DIAGNOSIS — M17.11 PRIMARY OSTEOARTHRITIS OF RIGHT KNEE: ICD-10-CM

## 2022-10-03 PROCEDURE — 73560 X-RAY EXAM OF KNEE 1 OR 2: CPT | Performed by: INTERNAL MEDICINE

## 2022-10-03 NOTE — TELEPHONE ENCOUNTER
Patient scheduled an appointment.      Future Appointments   Date Time Provider Dawn Arias   11/7/2022  8:40 AM Licha Bartholomew MD 2014 [facility]   1/30/2023  8:20 AM Murtaza West MD [facility]

## 2022-10-27 ENCOUNTER — TELEPHONE (OUTPATIENT)
Dept: PHYSICAL THERAPY | Facility: HOSPITAL | Age: 62
End: 2022-10-27

## 2022-10-29 ENCOUNTER — APPOINTMENT (OUTPATIENT)
Dept: GENERAL RADIOLOGY | Age: 62
End: 2022-10-29
Attending: NURSE PRACTITIONER
Payer: MEDICARE

## 2022-10-29 ENCOUNTER — HOSPITAL ENCOUNTER (OUTPATIENT)
Age: 62
Discharge: HOME | End: 2022-10-29
Payer: MEDICARE

## 2022-10-29 VITALS
OXYGEN SATURATION: 98 % | SYSTOLIC BLOOD PRESSURE: 162 MMHG | RESPIRATION RATE: 18 BRPM | DIASTOLIC BLOOD PRESSURE: 62 MMHG | HEART RATE: 70 BPM | TEMPERATURE: 97 F

## 2022-10-29 DIAGNOSIS — R05.9 COUGH: ICD-10-CM

## 2022-10-29 DIAGNOSIS — L30.9 HAND DERMATITIS: ICD-10-CM

## 2022-10-29 DIAGNOSIS — J01.10 ACUTE FRONTAL SINUSITIS, RECURRENCE NOT SPECIFIED: Primary | ICD-10-CM

## 2022-10-29 PROCEDURE — 99213 OFFICE O/P EST LOW 20 MIN: CPT | Performed by: NURSE PRACTITIONER

## 2022-10-29 PROCEDURE — 71046 X-RAY EXAM CHEST 2 VIEWS: CPT | Performed by: NURSE PRACTITIONER

## 2022-10-29 RX ORDER — DIAPER,BRIEF,INFANT-TODD,DISP
1 EACH MISCELLANEOUS 2 TIMES DAILY
Qty: 45 G | Refills: 0 | Status: SHIPPED | OUTPATIENT
Start: 2022-10-29 | End: 2022-11-05

## 2022-10-29 RX ORDER — AMOXICILLIN AND CLAVULANATE POTASSIUM 875; 125 MG/1; MG/1
1 TABLET, FILM COATED ORAL 2 TIMES DAILY
Qty: 14 TABLET | Refills: 0 | Status: SHIPPED | OUTPATIENT
Start: 2022-10-29 | End: 2022-11-05

## 2022-10-31 ENCOUNTER — OFFICE VISIT (OUTPATIENT)
Dept: PHYSICAL THERAPY | Facility: HOSPITAL | Age: 62
End: 2022-10-31
Attending: INTERNAL MEDICINE
Payer: MEDICARE

## 2022-10-31 DIAGNOSIS — M17.11 PRIMARY OSTEOARTHRITIS OF RIGHT KNEE: ICD-10-CM

## 2022-10-31 PROCEDURE — 97110 THERAPEUTIC EXERCISES: CPT

## 2022-10-31 PROCEDURE — 97162 PT EVAL MOD COMPLEX 30 MIN: CPT

## 2022-11-02 ENCOUNTER — OFFICE VISIT (OUTPATIENT)
Dept: PHYSICAL THERAPY | Facility: HOSPITAL | Age: 62
End: 2022-11-02
Attending: INTERNAL MEDICINE
Payer: MEDICARE

## 2022-11-02 PROCEDURE — 97140 MANUAL THERAPY 1/> REGIONS: CPT

## 2022-11-02 PROCEDURE — 97110 THERAPEUTIC EXERCISES: CPT

## 2022-11-08 ENCOUNTER — OFFICE VISIT (OUTPATIENT)
Dept: PHYSICAL THERAPY | Facility: HOSPITAL | Age: 62
End: 2022-11-08
Attending: INTERNAL MEDICINE
Payer: MEDICARE

## 2022-11-08 PROCEDURE — 97110 THERAPEUTIC EXERCISES: CPT

## 2022-11-08 PROCEDURE — 97140 MANUAL THERAPY 1/> REGIONS: CPT

## 2022-11-10 ENCOUNTER — OFFICE VISIT (OUTPATIENT)
Dept: PHYSICAL THERAPY | Facility: HOSPITAL | Age: 62
End: 2022-11-10
Attending: INTERNAL MEDICINE
Payer: MEDICARE

## 2022-11-10 PROCEDURE — 97110 THERAPEUTIC EXERCISES: CPT

## 2022-11-10 PROCEDURE — 97140 MANUAL THERAPY 1/> REGIONS: CPT

## 2022-11-15 ENCOUNTER — APPOINTMENT (OUTPATIENT)
Dept: PHYSICAL THERAPY | Facility: HOSPITAL | Age: 62
End: 2022-11-15
Attending: INTERNAL MEDICINE
Payer: MEDICARE

## 2022-11-17 ENCOUNTER — APPOINTMENT (OUTPATIENT)
Dept: PHYSICAL THERAPY | Facility: HOSPITAL | Age: 62
End: 2022-11-17
Attending: INTERNAL MEDICINE
Payer: MEDICARE

## 2022-11-22 ENCOUNTER — OFFICE VISIT (OUTPATIENT)
Dept: PHYSICAL THERAPY | Facility: HOSPITAL | Age: 62
End: 2022-11-22
Attending: INTERNAL MEDICINE
Payer: MEDICARE

## 2022-11-22 PROCEDURE — 97110 THERAPEUTIC EXERCISES: CPT

## 2022-11-22 PROCEDURE — 97140 MANUAL THERAPY 1/> REGIONS: CPT

## 2022-11-26 ENCOUNTER — IMMUNIZATION (OUTPATIENT)
Dept: LAB | Age: 62
End: 2022-11-26
Attending: EMERGENCY MEDICINE
Payer: MEDICARE

## 2022-11-26 DIAGNOSIS — Z23 NEED FOR VACCINATION: Primary | ICD-10-CM

## 2022-11-26 PROCEDURE — 0124A SARSCOV2 VAC BVL 30MCG/0.3ML: CPT

## 2022-11-28 ENCOUNTER — OFFICE VISIT (OUTPATIENT)
Dept: PHYSICAL THERAPY | Facility: HOSPITAL | Age: 62
End: 2022-11-28
Attending: INTERNAL MEDICINE
Payer: MEDICARE

## 2022-11-28 PROCEDURE — 97110 THERAPEUTIC EXERCISES: CPT

## 2022-11-28 PROCEDURE — 97140 MANUAL THERAPY 1/> REGIONS: CPT

## 2022-11-30 ENCOUNTER — OFFICE VISIT (OUTPATIENT)
Dept: PHYSICAL THERAPY | Facility: HOSPITAL | Age: 62
End: 2022-11-30
Attending: INTERNAL MEDICINE
Payer: MEDICARE

## 2022-11-30 PROCEDURE — 97140 MANUAL THERAPY 1/> REGIONS: CPT

## 2022-11-30 PROCEDURE — 97110 THERAPEUTIC EXERCISES: CPT

## 2022-12-01 DIAGNOSIS — Z79.82 LONG TERM CURRENT USE OF ASPIRIN: ICD-10-CM

## 2022-12-01 RX ORDER — ASPIRIN 81 MG/1
TABLET, COATED ORAL
Qty: 90 TABLET | Refills: 0 | Status: SHIPPED | OUTPATIENT
Start: 2022-12-01

## 2022-12-05 ENCOUNTER — TELEPHONE (OUTPATIENT)
Dept: PHYSICAL THERAPY | Facility: HOSPITAL | Age: 62
End: 2022-12-05

## 2022-12-06 ENCOUNTER — TELEPHONE (OUTPATIENT)
Dept: PHYSICAL THERAPY | Facility: HOSPITAL | Age: 62
End: 2022-12-06

## 2022-12-07 ENCOUNTER — OFFICE VISIT (OUTPATIENT)
Dept: PHYSICAL THERAPY | Facility: HOSPITAL | Age: 62
End: 2022-12-07
Attending: INTERNAL MEDICINE
Payer: MEDICARE

## 2022-12-07 PROCEDURE — 97140 MANUAL THERAPY 1/> REGIONS: CPT

## 2022-12-07 PROCEDURE — 97110 THERAPEUTIC EXERCISES: CPT

## 2022-12-19 ENCOUNTER — TELEPHONE (OUTPATIENT)
Dept: PHYSICAL THERAPY | Facility: HOSPITAL | Age: 62
End: 2022-12-19

## 2022-12-21 ENCOUNTER — APPOINTMENT (OUTPATIENT)
Dept: PHYSICAL THERAPY | Facility: HOSPITAL | Age: 62
End: 2022-12-21
Attending: INTERNAL MEDICINE
Payer: MEDICARE

## 2022-12-28 ENCOUNTER — OFFICE VISIT (OUTPATIENT)
Dept: PHYSICAL THERAPY | Facility: HOSPITAL | Age: 62
End: 2022-12-28
Attending: INTERNAL MEDICINE
Payer: MEDICARE

## 2022-12-28 PROCEDURE — 97140 MANUAL THERAPY 1/> REGIONS: CPT

## 2022-12-28 PROCEDURE — 97110 THERAPEUTIC EXERCISES: CPT

## 2023-01-04 ENCOUNTER — OFFICE VISIT (OUTPATIENT)
Dept: PHYSICAL THERAPY | Facility: HOSPITAL | Age: 63
End: 2023-01-04
Attending: INTERNAL MEDICINE
Payer: MEDICARE

## 2023-01-04 PROCEDURE — 97110 THERAPEUTIC EXERCISES: CPT

## 2023-01-04 PROCEDURE — 97140 MANUAL THERAPY 1/> REGIONS: CPT

## 2023-01-09 ENCOUNTER — OFFICE VISIT (OUTPATIENT)
Dept: PHYSICAL THERAPY | Facility: HOSPITAL | Age: 63
End: 2023-01-09
Attending: INTERNAL MEDICINE
Payer: MEDICARE

## 2023-01-09 PROCEDURE — 97110 THERAPEUTIC EXERCISES: CPT

## 2023-01-16 ENCOUNTER — APPOINTMENT (OUTPATIENT)
Dept: PHYSICAL THERAPY | Facility: HOSPITAL | Age: 63
End: 2023-01-16
Attending: INTERNAL MEDICINE
Payer: MEDICARE

## 2023-01-16 ENCOUNTER — TELEPHONE (OUTPATIENT)
Dept: PHYSICAL THERAPY | Facility: HOSPITAL | Age: 63
End: 2023-01-16

## 2023-01-30 ENCOUNTER — OFFICE VISIT (OUTPATIENT)
Dept: INTERNAL MEDICINE CLINIC | Facility: CLINIC | Age: 63
End: 2023-01-30
Payer: COMMERCIAL

## 2023-01-30 VITALS
HEART RATE: 67 BPM | WEIGHT: 192 LBS | DIASTOLIC BLOOD PRESSURE: 70 MMHG | SYSTOLIC BLOOD PRESSURE: 114 MMHG | TEMPERATURE: 98 F | OXYGEN SATURATION: 97 % | BODY MASS INDEX: 38 KG/M2

## 2023-01-30 DIAGNOSIS — K21.9 GASTROESOPHAGEAL REFLUX DISEASE WITHOUT ESOPHAGITIS: ICD-10-CM

## 2023-01-30 DIAGNOSIS — M79.7 FIBROMYALGIA: ICD-10-CM

## 2023-01-30 DIAGNOSIS — K44.9 HIATAL HERNIA: ICD-10-CM

## 2023-01-30 DIAGNOSIS — I10 ESSENTIAL HYPERTENSION: ICD-10-CM

## 2023-01-30 DIAGNOSIS — R73.03 PREDIABETES: Primary | ICD-10-CM

## 2023-01-30 DIAGNOSIS — F32.4 MAJOR DEPRESSIVE DISORDER WITH SINGLE EPISODE, IN PARTIAL REMISSION (HCC): ICD-10-CM

## 2023-01-30 DIAGNOSIS — E78.00 HYPERCHOLESTEREMIA: ICD-10-CM

## 2023-01-30 LAB — HEMOGLOBIN A1C: 5.9 % (ref 4.3–5.6)

## 2023-01-30 PROCEDURE — 3078F DIAST BP <80 MM HG: CPT | Performed by: FAMILY MEDICINE

## 2023-01-30 PROCEDURE — 3074F SYST BP LT 130 MM HG: CPT | Performed by: FAMILY MEDICINE

## 2023-01-30 PROCEDURE — 83036 HEMOGLOBIN GLYCOSYLATED A1C: CPT | Performed by: FAMILY MEDICINE

## 2023-01-30 PROCEDURE — 99214 OFFICE O/P EST MOD 30 MIN: CPT | Performed by: FAMILY MEDICINE

## 2023-01-30 RX ORDER — GABAPENTIN 300 MG/1
300 CAPSULE ORAL NIGHTLY
Qty: 90 CAPSULE | Refills: 3 | Status: SHIPPED | OUTPATIENT
Start: 2023-01-30

## 2023-01-30 RX ORDER — METOPROLOL SUCCINATE 25 MG/1
12.5 TABLET, EXTENDED RELEASE ORAL DAILY
Qty: 45 TABLET | Refills: 1 | Status: SHIPPED | OUTPATIENT
Start: 2023-01-30

## 2023-01-30 RX ORDER — PRAVASTATIN SODIUM 20 MG
20 TABLET ORAL NIGHTLY
Qty: 90 TABLET | Refills: 1 | Status: SHIPPED | OUTPATIENT
Start: 2023-01-30

## 2023-01-30 RX ORDER — PANTOPRAZOLE SODIUM 40 MG/1
40 TABLET, DELAYED RELEASE ORAL
Qty: 90 TABLET | Refills: 1 | Status: SHIPPED | OUTPATIENT
Start: 2023-01-30

## 2023-01-30 RX ORDER — MULTIVIT-MIN/IRON/FOLIC ACID/K 18-600-40
CAPSULE ORAL
COMMUNITY

## 2023-01-30 RX ORDER — PAROXETINE 10 MG/1
10 TABLET, FILM COATED ORAL EVERY MORNING
Qty: 90 TABLET | Refills: 3 | Status: SHIPPED | OUTPATIENT
Start: 2023-01-30

## 2023-02-08 NOTE — TELEPHONE ENCOUNTER
Gloria Suresh can you call pt to WILMA cuellarx
Will call patient to reschedule.
noted
pts daughter, Mark Rodríguez  called. pt was seen today with Dr. Joselyn Paz. Not able to get an appt with PCP until 1/10/18. Will need to reschedule the surgery. Please call  Thank you.
show

## 2023-02-13 ENCOUNTER — TELEPHONE (OUTPATIENT)
Dept: INTERNAL MEDICINE CLINIC | Facility: CLINIC | Age: 63
End: 2023-02-13

## 2023-02-13 ENCOUNTER — OFFICE VISIT (OUTPATIENT)
Dept: PHYSICAL THERAPY | Facility: HOSPITAL | Age: 63
End: 2023-02-13
Attending: INTERNAL MEDICINE
Payer: MEDICARE

## 2023-02-13 PROCEDURE — 97110 THERAPEUTIC EXERCISES: CPT

## 2023-02-16 NOTE — TELEPHONE ENCOUNTER
Called patient and gave her an update and informed her forms will be completed when  returns from vacation

## 2023-02-22 ENCOUNTER — TELEPHONE (OUTPATIENT)
Dept: PHYSICAL THERAPY | Facility: HOSPITAL | Age: 63
End: 2023-02-22

## 2023-02-22 ENCOUNTER — APPOINTMENT (OUTPATIENT)
Dept: PHYSICAL THERAPY | Facility: HOSPITAL | Age: 63
End: 2023-02-22
Attending: FAMILY MEDICINE
Payer: MEDICARE

## 2023-03-02 DIAGNOSIS — Z79.82 LONG TERM CURRENT USE OF ASPIRIN: ICD-10-CM

## 2023-03-02 RX ORDER — ASPIRIN 81 MG/1
TABLET, COATED ORAL
Qty: 90 TABLET | Refills: 0 | Status: SHIPPED | OUTPATIENT
Start: 2023-03-02

## 2023-03-14 NOTE — PROGRESS NOTES
Dx: Biceps tendinitis of right shoulder (M75.21)  Rotator cuff tendinitis, right (M75.81)         Insurance (Authorized # of Visits):  [employer] Medicare, no limit           Authorizing Physician: Dr. Abarca Shown  Next MD visit: none scheduled  Fall Risk: standard cabinets. MET  Pt will improve shoulder abduction AROM to 155 degrees to improve ability to don/doff shirts, and wash hair. MET  Pt will increase shoulder ER AROM to 90 deg to promote normalized ADL of reaching and fastening seatbelt.   MET   Pt will incre distraction, post glides, inf glides gr3  -STM R pects    -MWM: gentle post glide  g-h jt with shoulder IR @ 90deg Abd; 10x TE  -AROM R shldr flexion and Abduction: thumb up: see above  -Ball roll outs on table    -Ball roll ups on wall  -AROM R shldr flex monitor with opp hand over shldr to avoid anterior glides or pect activation;   -Ball roll ups on wall HEP: HEP:            Charges: TE x3;        Total Timed Treatment: 45 min  Total Treatment Time: 46 min No

## 2023-03-15 ENCOUNTER — TELEPHONE (OUTPATIENT)
Dept: PHYSICAL THERAPY | Facility: HOSPITAL | Age: 63
End: 2023-03-15

## 2023-03-20 ENCOUNTER — OFFICE VISIT (OUTPATIENT)
Dept: PHYSICAL THERAPY | Facility: HOSPITAL | Age: 63
End: 2023-03-20
Attending: FAMILY MEDICINE
Payer: MEDICARE

## 2023-03-20 PROCEDURE — 97110 THERAPEUTIC EXERCISES: CPT

## 2023-03-27 ENCOUNTER — OFFICE VISIT (OUTPATIENT)
Dept: PHYSICAL THERAPY | Facility: HOSPITAL | Age: 63
End: 2023-03-27
Attending: FAMILY MEDICINE
Payer: MEDICARE

## 2023-03-27 PROCEDURE — 97140 MANUAL THERAPY 1/> REGIONS: CPT

## 2023-03-27 PROCEDURE — 97110 THERAPEUTIC EXERCISES: CPT

## 2023-04-03 ENCOUNTER — OFFICE VISIT (OUTPATIENT)
Dept: PHYSICAL THERAPY | Facility: HOSPITAL | Age: 63
End: 2023-04-03
Attending: FAMILY MEDICINE
Payer: MEDICARE

## 2023-04-03 ENCOUNTER — TELEPHONE (OUTPATIENT)
Dept: RHEUMATOLOGY | Facility: CLINIC | Age: 63
End: 2023-04-03

## 2023-04-03 PROCEDURE — 97110 THERAPEUTIC EXERCISES: CPT

## 2023-04-03 NOTE — TELEPHONE ENCOUNTER
Patient came in to drop off a form to be signed by Dr Chitra Hernandez. Please notify patient upon completion. Thank you.

## 2023-04-13 NOTE — TELEPHONE ENCOUNTER
Called and spoke with patient, name and  was verified. Inform patient per Dr. Margarita Wilcox it would be best for her pcp sign documents. She seen Dr. Yolette Browne on 23 and noted patient is prediabetes dx, A1C is 5.9. On her application qualifications specified Note: A pre-diabetes diagnosis does not qualify for this plan.  Patient was inform of this and will  form on Saturday 4/15/23 btw 8-12 pm.

## 2023-05-19 ENCOUNTER — HOSPITAL ENCOUNTER (OUTPATIENT)
Age: 63
Discharge: HOME | End: 2023-05-19
Payer: MEDICARE

## 2023-05-19 VITALS
OXYGEN SATURATION: 97 % | RESPIRATION RATE: 18 BRPM | SYSTOLIC BLOOD PRESSURE: 144 MMHG | TEMPERATURE: 98 F | DIASTOLIC BLOOD PRESSURE: 63 MMHG | HEART RATE: 62 BPM

## 2023-05-19 DIAGNOSIS — Z20.822 ENCOUNTER FOR LABORATORY TESTING FOR COVID-19 VIRUS: Primary | ICD-10-CM

## 2023-05-19 DIAGNOSIS — J06.9 VIRAL UPPER RESPIRATORY TRACT INFECTION: ICD-10-CM

## 2023-05-19 LAB
S PYO AG THROAT QL: NEGATIVE
SARS-COV-2 RNA RESP QL NAA+PROBE: NOT DETECTED

## 2023-05-19 NOTE — ED INITIAL ASSESSMENT (HPI)
Pt presents with cough, congestion, sore throat x 2 days. No fever. No medication taken today for fever.      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

## 2023-05-29 DIAGNOSIS — Z79.82 LONG TERM CURRENT USE OF ASPIRIN: ICD-10-CM

## 2023-05-30 RX ORDER — ASPIRIN 81 MG/1
TABLET, COATED ORAL
Qty: 90 TABLET | Refills: 0 | Status: SHIPPED | OUTPATIENT
Start: 2023-05-30

## 2023-06-16 RX ORDER — NAPROXEN 500 MG/1
TABLET ORAL
Qty: 60 TABLET | Refills: 0 | Status: SHIPPED | OUTPATIENT
Start: 2023-06-16

## 2023-06-16 NOTE — TELEPHONE ENCOUNTER
Medication Quantity Refills Start End   naproxen 500 MG Oral Tab 60 tablet 0 5/20/2023      LOV: 9/26/22  Future Appointments   Date Time Provider Dawn Arias   6/20/2023  4:30 PM Barrie Lowery MD 2014 [facility]   8/7/2023 10:20 AM Nenita Schwab MD [facility]   8/19/2023  9:40 AM Barrie Lowery MD 2014 [facility]     Summary:  1. Cont. Gabapentin 300mg at night   2. Diclofenac 1 % gel - or VOLTAREN GEL 1% for right knee - upt four times a day   3. Will get authorization for GEL/ hyaluronic acid injection for right knee   4. Consider tyelnol 500-650mg 1-2 tablets a day -   AND /OR ibuprofen 200mg two to three times a day as needed. 5  Check right knee xray   6.  Physical therapy for right knee   7.return to clinic in 2 -3 weeks.   -also taking vit marilia Johnson MD  9/28/2022   4:07 AM  - [place]  through Epic

## 2023-06-20 ENCOUNTER — OFFICE VISIT (OUTPATIENT)
Dept: RHEUMATOLOGY | Facility: CLINIC | Age: 63
End: 2023-06-20

## 2023-06-20 ENCOUNTER — HOSPITAL ENCOUNTER (OUTPATIENT)
Dept: GENERAL RADIOLOGY | Facility: HOSPITAL | Age: 63
Discharge: HOME | End: 2023-06-20
Attending: INTERNAL MEDICINE
Payer: MEDICARE

## 2023-06-20 VITALS
WEIGHT: 196 LBS | BODY MASS INDEX: 34.73 KG/M2 | HEART RATE: 61 BPM | SYSTOLIC BLOOD PRESSURE: 145 MMHG | HEIGHT: 63 IN | DIASTOLIC BLOOD PRESSURE: 57 MMHG

## 2023-06-20 DIAGNOSIS — M79.7 FIBROMYALGIA: Primary | ICD-10-CM

## 2023-06-20 DIAGNOSIS — M25.562 ACUTE PAIN OF LEFT KNEE: ICD-10-CM

## 2023-06-20 DIAGNOSIS — E55.9 VITAMIN D DEFICIENCY: ICD-10-CM

## 2023-06-20 DIAGNOSIS — M17.11 PRIMARY OSTEOARTHRITIS OF RIGHT KNEE: ICD-10-CM

## 2023-06-20 PROCEDURE — 3008F BODY MASS INDEX DOCD: CPT | Performed by: INTERNAL MEDICINE

## 2023-06-20 PROCEDURE — 73560 X-RAY EXAM OF KNEE 1 OR 2: CPT | Performed by: INTERNAL MEDICINE

## 2023-06-20 PROCEDURE — 3077F SYST BP >= 140 MM HG: CPT | Performed by: INTERNAL MEDICINE

## 2023-06-20 PROCEDURE — 99214 OFFICE O/P EST MOD 30 MIN: CPT | Performed by: INTERNAL MEDICINE

## 2023-06-20 PROCEDURE — 3078F DIAST BP <80 MM HG: CPT | Performed by: INTERNAL MEDICINE

## 2023-07-27 ENCOUNTER — HOSPITAL ENCOUNTER (OUTPATIENT)
Dept: MAMMOGRAPHY | Facility: HOSPITAL | Age: 63
Discharge: HOME | End: 2023-07-27
Attending: FAMILY MEDICINE
Payer: MEDICARE

## 2023-07-27 DIAGNOSIS — Z12.31 ENCOUNTER FOR SCREENING MAMMOGRAM FOR MALIGNANT NEOPLASM OF BREAST: ICD-10-CM

## 2023-07-27 PROCEDURE — 77063 BREAST TOMOSYNTHESIS BI: CPT | Performed by: FAMILY MEDICINE

## 2023-07-27 PROCEDURE — 77067 SCR MAMMO BI INCL CAD: CPT | Performed by: FAMILY MEDICINE

## 2023-08-27 DIAGNOSIS — Z79.82 LONG TERM CURRENT USE OF ASPIRIN: ICD-10-CM

## 2023-08-28 RX ORDER — ASPIRIN 81 MG/1
81 TABLET ORAL DAILY
Qty: 90 TABLET | Refills: 0 | Status: SHIPPED | OUTPATIENT
Start: 2023-08-28

## 2023-09-27 DIAGNOSIS — E78.00 HYPERCHOLESTEREMIA: ICD-10-CM

## 2023-09-29 RX ORDER — PRAVASTATIN SODIUM 20 MG
20 TABLET ORAL NIGHTLY
Qty: 90 TABLET | Refills: 0 | Status: SHIPPED | OUTPATIENT
Start: 2023-09-29 | End: 2023-10-16

## 2023-10-16 ENCOUNTER — OFFICE VISIT (OUTPATIENT)
Dept: INTERNAL MEDICINE CLINIC | Facility: CLINIC | Age: 63
End: 2023-10-16
Payer: COMMERCIAL

## 2023-10-16 VITALS
DIASTOLIC BLOOD PRESSURE: 68 MMHG | BODY MASS INDEX: 37.11 KG/M2 | TEMPERATURE: 98 F | HEART RATE: 60 BPM | WEIGHT: 194 LBS | OXYGEN SATURATION: 95 % | SYSTOLIC BLOOD PRESSURE: 122 MMHG | HEIGHT: 60.63 IN

## 2023-10-16 DIAGNOSIS — E66.01 CLASS 2 SEVERE OBESITY DUE TO EXCESS CALORIES WITH SERIOUS COMORBIDITY AND BODY MASS INDEX (BMI) OF 37.0 TO 37.9 IN ADULT: ICD-10-CM

## 2023-10-16 DIAGNOSIS — Z98.890 S/P MEDIAL MENISCUS REPAIR OF LEFT KNEE: ICD-10-CM

## 2023-10-16 DIAGNOSIS — Z98.890 S/P LATERAL MENISCUS REPAIR OF LEFT KNEE: ICD-10-CM

## 2023-10-16 DIAGNOSIS — M79.7 FIBROMYALGIA: ICD-10-CM

## 2023-10-16 DIAGNOSIS — E78.00 HYPERCHOLESTEREMIA: ICD-10-CM

## 2023-10-16 DIAGNOSIS — K44.9 HIATAL HERNIA: ICD-10-CM

## 2023-10-16 DIAGNOSIS — H02.889 MGD (MEIBOMIAN GLAND DYSFUNCTION): ICD-10-CM

## 2023-10-16 DIAGNOSIS — H25.13 AGE-RELATED NUCLEAR CATARACT OF BOTH EYES: ICD-10-CM

## 2023-10-16 DIAGNOSIS — M47.816 SPONDYLOSIS OF LUMBAR REGION WITHOUT MYELOPATHY OR RADICULOPATHY: ICD-10-CM

## 2023-10-16 DIAGNOSIS — G47.33 OSA (OBSTRUCTIVE SLEEP APNEA): ICD-10-CM

## 2023-10-16 DIAGNOSIS — M41.80 DEXTROSCOLIOSIS: ICD-10-CM

## 2023-10-16 DIAGNOSIS — Z79.82 LONG TERM CURRENT USE OF ASPIRIN: ICD-10-CM

## 2023-10-16 DIAGNOSIS — K21.9 GASTROESOPHAGEAL REFLUX DISEASE WITHOUT ESOPHAGITIS: ICD-10-CM

## 2023-10-16 DIAGNOSIS — I10 ESSENTIAL HYPERTENSION: ICD-10-CM

## 2023-10-16 DIAGNOSIS — K80.20 CALCULUS OF GALLBLADDER WITHOUT CHOLECYSTITIS WITHOUT OBSTRUCTION: ICD-10-CM

## 2023-10-16 DIAGNOSIS — R73.03 PREDIABETES: ICD-10-CM

## 2023-10-16 DIAGNOSIS — K64.8 INTERNAL HEMORRHOIDS: ICD-10-CM

## 2023-10-16 DIAGNOSIS — F32.4 MAJOR DEPRESSIVE DISORDER WITH SINGLE EPISODE, IN PARTIAL REMISSION (HCC): ICD-10-CM

## 2023-10-16 DIAGNOSIS — H35.033 HYPERTENSIVE RETINOPATHY OF BOTH EYES: ICD-10-CM

## 2023-10-16 DIAGNOSIS — Z78.0 POSTMENOPAUSAL: ICD-10-CM

## 2023-10-16 DIAGNOSIS — M15.9 PRIMARY OSTEOARTHRITIS INVOLVING MULTIPLE JOINTS: ICD-10-CM

## 2023-10-16 DIAGNOSIS — Z00.00 ENCOUNTER FOR ANNUAL HEALTH EXAMINATION: Primary | ICD-10-CM

## 2023-10-16 DIAGNOSIS — Z13.6 SCREENING FOR CARDIOVASCULAR CONDITION: ICD-10-CM

## 2023-10-16 DIAGNOSIS — K76.0 HEPATIC STEATOSIS: ICD-10-CM

## 2023-10-16 DIAGNOSIS — K31.7 GASTRIC POLYPS: ICD-10-CM

## 2023-10-16 DIAGNOSIS — G47.61 PERIODIC LIMB MOVEMENT DISORDER (PLMD): ICD-10-CM

## 2023-10-16 DIAGNOSIS — E53.8 VITAMIN B12 DEFICIENCY: ICD-10-CM

## 2023-10-16 RX ORDER — PANTOPRAZOLE SODIUM 40 MG/1
40 TABLET, DELAYED RELEASE ORAL
Qty: 90 TABLET | Refills: 3 | Status: SHIPPED | OUTPATIENT
Start: 2023-10-16

## 2023-10-16 RX ORDER — GABAPENTIN 300 MG/1
300 CAPSULE ORAL NIGHTLY
Qty: 90 CAPSULE | Refills: 3 | Status: SHIPPED | OUTPATIENT
Start: 2023-10-16

## 2023-10-16 RX ORDER — METOPROLOL SUCCINATE 25 MG/1
12.5 TABLET, EXTENDED RELEASE ORAL DAILY
Qty: 45 TABLET | Refills: 1 | Status: SHIPPED | OUTPATIENT
Start: 2023-10-16

## 2023-10-16 RX ORDER — ASPIRIN 81 MG/1
81 TABLET ORAL DAILY
Qty: 90 TABLET | Refills: 3 | Status: SHIPPED | OUTPATIENT
Start: 2023-10-16

## 2023-10-16 RX ORDER — PRAVASTATIN SODIUM 20 MG
20 TABLET ORAL NIGHTLY
Qty: 90 TABLET | Refills: 3 | Status: SHIPPED | OUTPATIENT
Start: 2023-10-16

## 2023-10-16 RX ORDER — PAROXETINE 10 MG/1
10 TABLET, FILM COATED ORAL EVERY MORNING
Qty: 90 TABLET | Refills: 3 | Status: SHIPPED | OUTPATIENT
Start: 2023-10-16

## 2023-11-13 ENCOUNTER — HOSPITAL ENCOUNTER (OUTPATIENT)
Age: 63
Discharge: HOME | End: 2023-11-13
Payer: MEDICARE

## 2023-11-13 VITALS
OXYGEN SATURATION: 98 % | HEART RATE: 65 BPM | SYSTOLIC BLOOD PRESSURE: 157 MMHG | DIASTOLIC BLOOD PRESSURE: 76 MMHG | RESPIRATION RATE: 20 BRPM | TEMPERATURE: 98 F

## 2023-11-13 DIAGNOSIS — R05.1 ACUTE COUGH: ICD-10-CM

## 2023-11-13 DIAGNOSIS — J02.9 ACUTE PHARYNGITIS, UNSPECIFIED ETIOLOGY: Primary | ICD-10-CM

## 2023-11-13 DIAGNOSIS — R03.0 ELEVATED BLOOD PRESSURE READING: ICD-10-CM

## 2023-11-13 LAB
POCT INFLUENZA A: NEGATIVE
POCT INFLUENZA B: NEGATIVE
S PYO AG THROAT QL: NEGATIVE
SARS-COV-2 RNA RESP QL NAA+PROBE: NOT DETECTED

## 2023-11-13 PROCEDURE — 99214 OFFICE O/P EST MOD 30 MIN: CPT | Performed by: PHYSICIAN ASSISTANT

## 2023-11-13 PROCEDURE — 87880 STREP A ASSAY W/OPTIC: CPT | Performed by: PHYSICIAN ASSISTANT

## 2023-11-13 PROCEDURE — U0002 COVID-19 LAB TEST NON-CDC: HCPCS | Performed by: PHYSICIAN ASSISTANT

## 2023-11-13 PROCEDURE — 87502 INFLUENZA DNA AMP PROBE: CPT | Performed by: PHYSICIAN ASSISTANT

## 2023-11-13 RX ORDER — ACETAMINOPHEN 325 MG/1
650 TABLET ORAL
Qty: 40 TABLET | Refills: 0 | Status: SHIPPED | OUTPATIENT
Start: 2023-11-13

## 2023-11-13 RX ORDER — BENZONATATE 100 MG/1
100 CAPSULE ORAL 3 TIMES DAILY PRN
Qty: 30 CAPSULE | Refills: 0 | Status: SHIPPED | OUTPATIENT
Start: 2023-11-13 | End: 2023-12-13

## 2023-11-13 NOTE — ED INITIAL ASSESSMENT (HPI)
Pt presents with sore throat and cough, which started Friday. Denies fever. Grandchild is strep positive.

## 2023-12-18 ENCOUNTER — HOSPITAL ENCOUNTER (OUTPATIENT)
Age: 63
Discharge: HOME | End: 2023-12-18
Payer: MEDICARE

## 2023-12-18 VITALS
OXYGEN SATURATION: 98 % | RESPIRATION RATE: 16 BRPM | DIASTOLIC BLOOD PRESSURE: 63 MMHG | SYSTOLIC BLOOD PRESSURE: 167 MMHG | TEMPERATURE: 97 F | HEART RATE: 75 BPM

## 2023-12-18 DIAGNOSIS — J02.9 VIRAL PHARYNGITIS: Primary | ICD-10-CM

## 2023-12-18 DIAGNOSIS — R07.0 THROAT PAIN: ICD-10-CM

## 2023-12-18 LAB
S PYO AG THROAT QL: NEGATIVE
SARS-COV-2 RNA RESP QL NAA+PROBE: NOT DETECTED

## 2023-12-18 PROCEDURE — U0002 COVID-19 LAB TEST NON-CDC: HCPCS | Performed by: NURSE PRACTITIONER

## 2023-12-18 PROCEDURE — 99213 OFFICE O/P EST LOW 20 MIN: CPT | Performed by: NURSE PRACTITIONER

## 2023-12-18 PROCEDURE — 87880 STREP A ASSAY W/OPTIC: CPT | Performed by: NURSE PRACTITIONER

## 2023-12-18 NOTE — DISCHARGE INSTRUCTIONS
You are negative for strep throat and COVID-19. Likely viral pharyngitis. Supportive and symptomatic treatment at home. Drink plenty water and electrolytes. You may take Tylenol every 4 hours and Motrin every 6 hours as needed for discomfort. I recommend salt water gargles and Chloraseptic spray over-the-counter. I have prescribed Cepacol lozenges you may use 1 every 4 hours. Warm tea with honey. Sleep With humidifier. Steam showers for cough and congestion. I would avoid foods and liquids that tend to cause further throat irritation: Fatty, fried, spicy foods, salty, citrus, acidic foods and beverages. New worsening symptoms such as difficulty breathing, difficulty swallowing on secretions, stridor, worsening throat pain that is one-sided, inability to speak in complete full sentences, hot potato voice or muffled voice, please go to emergency room.

## 2023-12-19 RX ORDER — BENZONATATE 100 MG/1
200 CAPSULE ORAL 3 TIMES DAILY PRN
Qty: 30 CAPSULE | Refills: 0 | Status: SHIPPED | OUTPATIENT
Start: 2023-12-19 | End: 2024-01-18

## 2023-12-29 ENCOUNTER — HOSPITAL ENCOUNTER (OUTPATIENT)
Age: 63
Discharge: HOME | End: 2023-12-29
Payer: MEDICARE

## 2023-12-29 VITALS
SYSTOLIC BLOOD PRESSURE: 140 MMHG | OXYGEN SATURATION: 97 % | RESPIRATION RATE: 18 BRPM | TEMPERATURE: 97 F | HEART RATE: 61 BPM | DIASTOLIC BLOOD PRESSURE: 58 MMHG

## 2023-12-29 DIAGNOSIS — J01.90 ACUTE NON-RECURRENT SINUSITIS, UNSPECIFIED LOCATION: Primary | ICD-10-CM

## 2023-12-29 RX ORDER — BENZONATATE 100 MG/1
100 CAPSULE ORAL 3 TIMES DAILY PRN
Qty: 30 CAPSULE | Refills: 0 | Status: SHIPPED | OUTPATIENT
Start: 2023-12-29 | End: 2024-01-28

## 2023-12-29 RX ORDER — DOXYCYCLINE HYCLATE 100 MG/1
100 CAPSULE ORAL 2 TIMES DAILY
Qty: 14 CAPSULE | Refills: 0 | Status: SHIPPED | OUTPATIENT
Start: 2023-12-29 | End: 2024-01-05

## 2023-12-29 NOTE — DISCHARGE INSTRUCTIONS
Please take the antibiotics as prescribed for sinusitis. I also sent a prescription for Tessalon Perles for the cough. Please remember these might make you drowsy so do not drive or drink alcohol when you take them. Please also use Flonase and Mucinex for nasal congestion. May also use over the counter decongestants. You can also try using a Sandra pot/saline rinses for congestion. Use Tylenol or Motrin for pain or fever. If you develop any shortness of breath, chest pain, severe headache, fever that does not resolve with medications or any other worsening or concerning symptoms you should go to the emergency department. Otherwise follow-up with your primary care doctor.

## 2024-02-22 ENCOUNTER — HOSPITAL ENCOUNTER (OUTPATIENT)
Age: 64
Discharge: HOME | End: 2024-02-22
Payer: MEDICARE

## 2024-02-22 VITALS
SYSTOLIC BLOOD PRESSURE: 164 MMHG | HEART RATE: 64 BPM | RESPIRATION RATE: 22 BRPM | TEMPERATURE: 98 F | DIASTOLIC BLOOD PRESSURE: 69 MMHG | OXYGEN SATURATION: 95 %

## 2024-02-22 DIAGNOSIS — B34.9 VIRAL SYNDROME: ICD-10-CM

## 2024-02-22 DIAGNOSIS — R05.9 COUGH: Primary | ICD-10-CM

## 2024-02-22 LAB
POCT INFLUENZA A: NEGATIVE
POCT INFLUENZA B: NEGATIVE
SARS-COV-2 RNA RESP QL NAA+PROBE: NOT DETECTED

## 2024-02-22 PROCEDURE — 99213 OFFICE O/P EST LOW 20 MIN: CPT | Performed by: NURSE PRACTITIONER

## 2024-02-22 PROCEDURE — U0002 COVID-19 LAB TEST NON-CDC: HCPCS | Performed by: NURSE PRACTITIONER

## 2024-02-22 PROCEDURE — 87502 INFLUENZA DNA AMP PROBE: CPT | Performed by: NURSE PRACTITIONER

## 2024-02-22 RX ORDER — FLUTICASONE PROPIONATE 50 MCG
2 SPRAY, SUSPENSION (ML) NASAL DAILY
Qty: 16 G | Refills: 0 | Status: SHIPPED | OUTPATIENT
Start: 2024-02-22

## 2024-02-22 RX ORDER — ALBUTEROL SULFATE 90 UG/1
2 AEROSOL, METERED RESPIRATORY (INHALATION) EVERY 6 HOURS PRN
Qty: 1 EACH | Refills: 0 | Status: SHIPPED | OUTPATIENT
Start: 2024-02-22 | End: 2024-03-23

## 2024-02-22 RX ORDER — BENZONATATE 100 MG/1
100 CAPSULE ORAL 3 TIMES DAILY PRN
Qty: 30 CAPSULE | Refills: 0 | Status: SHIPPED | OUTPATIENT
Start: 2024-02-22 | End: 2024-03-23

## 2024-02-22 NOTE — ED PROVIDER NOTES
Patient Seen in: [facility] [place] [facility]      History     Chief Complaint   Patient presents with    Cough/URI     Stated Complaint: cough, sore throat    Subjective:   [facility]     Jey, #[id number] used for history, exam and plan.    63-year-old female here for evaluation of cough, runny nose, congestion x 4 days.  Reports mucus is yellow to green and concern for infection.  She denies chest pain, shortness of breath, dizziness, ear pain or sore throat.  She has not been taking any medications at home for her symptoms.  She denies sick contacts or recent travel.         Objective:   No pertinent past medical history.            No pertinent past surgical history.              No pertinent social history.            Review of Systems    Positive for stated complaint: cough, sore throat  Other systems are as noted in HPI.  Constitutional and vital signs reviewed.      All other systems reviewed and negative except as noted above.    Physical Exam     ED Triage Vitals [02/22/24 0910]   BP (!) 164/69   Pulse 64   Resp 22   Temp 98 °F (36.7 °C)   Temp src Temporal   SpO2 95 %   O2 Device None (Room air)       Current:BP (!) 164/69   Pulse 64   Temp 98 °F (36.7 °C) (Temporal)   Resp 22   SpO2 95%         Physical Exam  Vitals and nursing note reviewed.   Constitutional:       General: She is not in acute distress.     Appearance: Normal appearance. She is not ill-appearing or toxic-appearing.   HENT:      Head: Normocephalic and atraumatic.      Right Ear: Tympanic membrane, ear canal and external ear normal.      Left Ear: Tympanic membrane, ear canal and external ear normal.      Nose: Congestion present. No rhinorrhea.      Mouth/Throat:      Mouth: Mucous membranes are moist.      Pharynx: No oropharyngeal exudate or posterior oropharyngeal erythema.   Eyes:      Pupils: Pupils are equal, round, and reactive to light.   Cardiovascular:      Rate and Rhythm: Normal rate.      Pulses: Normal pulses.    Pulmonary:      Effort: Pulmonary effort is normal. No respiratory distress.      Breath sounds: Normal breath sounds. No stridor. No wheezing, rhonchi or rales.   Musculoskeletal:         General: Normal range of motion.   Skin:     General: Skin is warm.   Neurological:      Mental Status: She is alert and oriented to person, place, and time.   Psychiatric:         Mood and Affect: Mood normal.         Behavior: Behavior normal.               ED Course     Labs Reviewed   POCT FLU TEST - Normal    Narrative:     This assay is a rapid molecular in vitro test utilizing nucleic acid amplification of influenza A and B viral RNA.   RAPID SARS-COV-2 BY PCR - Normal          ED Course as of 02/22/24 1612  ------------------------------------------------------------  Time: 02/22 0939  Value: POCT Flu Test  Comment: (Reviewed)  ------------------------------------------------------------  Time: 02/22 0939  Value: Rapid SARS-CoV-2 by PCR  Comment: (Reviewed)              MDM     63-year-old female here for evaluation of runny nose congestion and cough for 4 days.    On exam patient well-appearing in no respiratory distress lungs are clear with no wheezing stridor or crackles, bilateral TM normal, pharynx clear with no erythema or swelling.  Patient is talking in full sentences in no respiratory distress    Differential diagnosis includes COVID versus flu versus other viral syndrome  Rapid COVID and flu are negative    Discussed viral syndrome with patient.  Discussed humidifier, p.o. fluids, warm teas, salt water gargles.  Advised to take Tessalon Perles 3 times a day, use albuterol inhaler as needed for cough and bronchospasm and Flonase nasal spray for congestion.  These were sent to pharmacy on file.    Patient agrees with plan of care she is stable nontoxic for discharge home  All questions answered. Return and ER precautions given.    Counseled: Patient, regarding diagnosis, regarding treatment plan, regarding  diagnostic results, regarding prescription, I have discussed with the patient the results of tests, differential diagnosis, and warning signs and symptoms that should prompt immediate return. The patient understands these instructions and agrees to the follow-up plan provided. There is no barriers to learning. Appropriate f/u given. Patient agrees to return for any concerns/ problems/complications.                                       Medical Decision Making      Disposition and Plan     Clinical Impression:  1. Cough    2. Viral syndrome         Disposition:  Discharge  2/22/2024  9:51 am    Follow-up:  Carolann Reese MD  [address] [address]  [phone]    Schedule an appointment as soon as possible for a visit in 1 week  If symptoms worsen          Medications Prescribed:  Discharge Medication List as of 2/22/2024  9:51 AM        START taking these medications    Details   fluticasone propionate 50 MCG/ACT Nasal Suspension 2 sprays by Nasal route daily., Normal, Disp-16 g, R-0      albuterol 108 (90 Base) MCG/ACT Inhalation Aero Soln Inhale 2 puffs into the lungs every 6 (six) hours as needed (cough, bronchospasm)., Normal, Disp-1 each, R-0

## 2024-02-22 NOTE — DISCHARGE INSTRUCTIONS
Viral infections are usually the worst days 3-5, but can last up to 14 days.  You may develop or continue to cough for up to 3 weeks after.  Viral infections do not improve with the use of antibiotics.  Chester diet, increase water intake; gatorade or pedialyte   Runny Nose/Congestion:  Humidifier at bedside   Vicks vaporub under nose and chest wall  - Flonase nasal spray once daily to each nostril  Cough:  - Mucinex DM as directed on package over the counter  Tessalon perles three times a day  Albuterol inhaler 2 puffs every 6 hours for cough, bronchospasm.  - Warm tea w/honey  - Humidifier in bedroom at night  Sore Throat:  - Salt water gargle  - Ibuprofen every 6-8 hours as needed for pain  Fever:  Tylenol or Motrin every 6 hours for fever > 100.4. You can alternate between the two as well if fever high.  Follow up with your primary care provider in the next 2-3 days.  Go to the emergency room with:  - Fever > 102 that does not respond to medications.  - Shortness of breath, difficulty breathing.  - Chest pain.  - Vomiting and unable to keep down fluids or medications

## 2024-02-29 ENCOUNTER — HOSPITAL ENCOUNTER (OUTPATIENT)
Age: 64
Discharge: HOME | End: 2024-02-29
Payer: MEDICARE

## 2024-02-29 ENCOUNTER — APPOINTMENT (OUTPATIENT)
Dept: GENERAL RADIOLOGY | Age: 64
End: 2024-02-29
Attending: NURSE PRACTITIONER
Payer: MEDICARE

## 2024-02-29 VITALS
RESPIRATION RATE: 20 BRPM | HEART RATE: 61 BPM | OXYGEN SATURATION: 96 % | DIASTOLIC BLOOD PRESSURE: 53 MMHG | TEMPERATURE: 98 F | SYSTOLIC BLOOD PRESSURE: 152 MMHG

## 2024-02-29 DIAGNOSIS — R05.1 ACUTE COUGH: ICD-10-CM

## 2024-02-29 DIAGNOSIS — J40 BRONCHITIS: Primary | ICD-10-CM

## 2024-02-29 PROCEDURE — 71046 X-RAY EXAM CHEST 2 VIEWS: CPT | Performed by: NURSE PRACTITIONER

## 2024-02-29 PROCEDURE — 99213 OFFICE O/P EST LOW 20 MIN: CPT | Performed by: NURSE PRACTITIONER

## 2024-02-29 RX ORDER — DOXYCYCLINE HYCLATE 100 MG/1
100 CAPSULE ORAL 2 TIMES DAILY
Qty: 14 CAPSULE | Refills: 0 | Status: SHIPPED | OUTPATIENT
Start: 2024-02-29 | End: 2024-03-07

## 2024-02-29 NOTE — DISCHARGE INSTRUCTIONS
Doxycyline 1 tablet twice per day for 7 days with food and large glass of water   Continue Benzonatate  Push fluids  Steam showers  If no improvement in 1 week, please see your primary care doctor  Return for worsening symptoms

## 2024-02-29 NOTE — ED PROVIDER NOTES
Chief Complaint   Patient presents with    Sore Throat       HPI:     Rosa Guillory is a 63 year old female HTN and OA who presents for evaluation and management of a chief complaint of  sore throat and cough for 2 weeks. No fever, chest pain, shortness of breath, nausea, vomiting, diarrhea or abdominal pain. Seen here 1 week ago and diagnosed with viral URI, started on albuterol, tessalon, flonase, which has helped some but still coughing quite a bit.     PFSH  PFSH asessment screens reviewed and agree.  Nursing note reviewed and I agree with documentation.    Family History   Problem Relation Age of Onset    Hypertension Father     Heart Disease Father     Hypertension Mother     Arrhythmia Mother     Cancer Sister         cervical CA    Hypertension Sister     Hypertension Sister     Cancer Sister     Polyps Sister     Other (Other) Maternal Grandfather     Other (Other) Paternal Grandmother     Other (Other) Paternal Grandfather     No Known Problems Maternal Grandmother     No Known Problems Daughter     No Known Problems Son     No Known Problems Brother     No Known Problems Brother     No Known Problems Brother     No Known Problems Sister     No Known Problems Self     No Known Problems Maternal Aunt     No Known Problems Paternal Aunt     No Known Problems Maternal Cousin Female     No Known Problems Maternal Cousin Male     No Known Problems Paternal Cousin Female     No Known Problems Paternal Cousin Male     No Known Problems Other     Breast Cancer Neg     Ovarian Cancer Neg     DCIS Neg     LCIS Neg     BRCA gene + Neg     Ashkenazi Sikhism Descent Neg      Family history reviewed with patient/caregiver and is not pertinent to presenting problem.  Social History     Socioeconomic History    Marital status:      Spouse name: Not on file    Number of children: Not on file    Years of education: Not on file    Highest education level: Not on file   Occupational History    Not on file   Tobacco Use     Smoking status: Never    Smokeless tobacco: Never   Vaping Use    Vaping Use: Never used   Substance and Sexual Activity    Alcohol use: No    Drug use: No    Sexual activity: Never   Other Topics Concern    Left Handed Not Asked    Right Handed Not Asked    Currently spends a great deal of time in the sun No    Past Sunlamp Treatments for Acne No    History of tanning No    Hx of Spending Great Deal of Time in Sun No    Bad sunburns in the past No    Tanning Salons in the Past No    Hx of Radiation Treatments No    Regular use of sun block No     Service Not Asked    Blood Transfusions Not Asked    Caffeine Concern No    Occupational Exposure Not Asked    Hobby Hazards Not Asked    Sleep Concern Not Asked    Stress Concern Not Asked    Weight Concern Not Asked    Special Diet Not Asked    Back Care Not Asked    Exercise Yes    Bike Helmet Yes     Comment: HEP    Seat Belt Not Asked    Self-Exams Not Asked    Grew up on a farm Not Asked    Outdoor occupation Not Asked    Pt has a pacemaker Not Asked    Pt has a defibrillator Not Asked    Breast feeding Not Asked    Reaction to local anesthetic No   Social History Narrative    The patient uses the following assistive device(s):  quad cane.      The patient does live in a home with stairs.     Social Determinants of Health     Financial Resource Strain: Not on file   Food Insecurity: Not on file   Transportation Needs: Not on file   Physical Activity: Not on file   Stress: Not on file   Social Connections: Not on file   Housing Stability: Not on file        Findings:    /53   Pulse 61   Temp 97.5 °F (36.4 °C) (Temporal)   Resp 20   SpO2 96%   GENERAL: well developed, well nourished, well hydrated, no distress  HEAD: normocephalic  NECK: supple, no adenopathy  EYES: sclera non icteric bilateral, conjunctiva clear  EARS: TM  bilateral: normal  NOSE: nasal turbinates: swollen, red, and clear drainage  THROAT: clear, without exudates  CARDIO: RRR  without murmur  LUNGS: clear to auscultation bilaterally; no rales, rhonchi, or wheezes  SKIN: good skin turgor, no obvious rashes    MDM/Assessment/Plan:   Orders for this encounter:  Orders Placed This Encounter    XR CHEST PA + LAT CHEST (CPT=71046)     sore throat Pt complaining of sore throat, cough for 2 weeks.         Order Specific Question:   What is the Relevant Clinical Indication / Reason for Exam?     Answer:   sore throat     Order Specific Question:   Release to patient     Answer:   Immediate    doxycycline 100 MG Oral Cap     Sig: Take 1 capsule (100 mg total) by mouth 2 (two) times daily for 7 days.     Dispense:  14 capsule     Refill:  0       Labs performed this visit:  No results found for this or any previous visit (from the past 10 hour(s)).    MDM:   Medical Decision Making  Differentials include: Bronchitis versus pneumonia versus viral URI versus other    HPI and exam most consistent with bronchitis.  Chest x-ray is negative for pneumonia.  Will start doxycycline.  Supportive care discussed.  Return precautions discussed.  Advised follow-up with primary care provider in 1 week if no improvement.  Patient verbalized understanding and agreeable to plan of care.     used for the duration of this visit     Amount and/or Complexity of Data Reviewed  Radiology: ordered and independent interpretation performed. Decision-making details documented in ED Course.     Details: I personally reviewed chest x-ray: No pneumonia    Risk  OTC drugs.  Prescription drug management.      Diagnosis:    ICD-10-CM    1. Bronchitis  J40       2. Acute cough  R05.1 XR CHEST PA + LAT CHEST (CPT=71046)     XR CHEST PA + LAT CHEST (CPT=71046)          All results reviewed and discussed with patient.  See AVS for detailed discharge instructions for your condition today.    Follow Up with:  No follow-up provider specified.

## 2024-03-04 ENCOUNTER — TELEPHONE (OUTPATIENT)
Dept: INTERNAL MEDICINE CLINIC | Facility: CLINIC | Age: 64
End: 2024-03-04

## 2024-03-04 DIAGNOSIS — Z79.82 LONG TERM CURRENT USE OF ASPIRIN: ICD-10-CM

## 2024-03-04 RX ORDER — ASPIRIN 81 MG/1
81 TABLET ORAL DAILY
Qty: 90 TABLET | Refills: 2 | Status: SHIPPED | OUTPATIENT
Start: 2024-03-04

## 2024-03-04 NOTE — TELEPHONE ENCOUNTER
Patient is calling asking if her Aspirin can be resend to mail order pharmacy on file instead of the [facility]. Originally the medication was send to [facility] and she should have refills on file until June.     Her insurance is telling her this year they need to go to mail order.       Please advise

## 2024-03-12 NOTE — TELEPHONE ENCOUNTER
Patient has new PCP Quality 154 Part B: Falls: Risk Screening (Should Be Reported With Measure 155.): Patient screened for future fall risk; documentation of no falls in the past year or only one fall without injury in the past year Detail Level: Detailed Quality 155 (Denominator): Falls Plan Of Care: Falls plan of care documented Quality 154 Part A: Falls: Risk Assessment (Should Be Reported With Measure 155.): Falls risk assessment completed and documented in the past 12 months.

## 2024-03-16 ENCOUNTER — LAB ENCOUNTER (OUTPATIENT)
Dept: LAB | Facility: HOSPITAL | Age: 64
End: 2024-03-16
Attending: FAMILY MEDICINE
Payer: MEDICARE

## 2024-03-16 DIAGNOSIS — E55.9 VITAMIN D DEFICIENCY: ICD-10-CM

## 2024-03-16 LAB
ALBUMIN SERPL-MCNC: 4.1 G/DL (ref 3.2–4.8)
ALBUMIN/GLOB SERPL: 1.2 {RATIO} (ref 1–2)
ALP LIVER SERPL-CCNC: 110 U/L
ALT SERPL-CCNC: 22 U/L
ANION GAP SERPL CALC-SCNC: 4 MMOL/L (ref 0–18)
AST SERPL-CCNC: 23 U/L (ref ?–34)
BASOPHILS # BLD AUTO: 0.03 X10(3) UL (ref 0–0.2)
BASOPHILS NFR BLD AUTO: 0.5 %
BILIRUB SERPL-MCNC: 0.3 MG/DL (ref 0.2–1.1)
BILIRUB UR QL: NEGATIVE
BUN BLD-MCNC: 17 MG/DL (ref 9–23)
BUN/CREAT SERPL: 26.6 (ref 10–20)
CALCIUM BLD-MCNC: 9.1 MG/DL (ref 8.7–10.4)
CHLORIDE SERPL-SCNC: 108 MMOL/L (ref 98–112)
CHOLEST SERPL-MCNC: 147 MG/DL (ref ?–200)
CLARITY UR: CLEAR
CO2 SERPL-SCNC: 27 MMOL/L (ref 21–32)
CREAT BLD-MCNC: 0.64 MG/DL
DEPRECATED RDW RBC AUTO: 44.4 FL (ref 35.1–46.3)
EGFRCR SERPLBLD CKD-EPI 2021: 99 ML/MIN/1.73M2 (ref 60–?)
EOSINOPHIL # BLD AUTO: 0.24 X10(3) UL (ref 0–0.7)
EOSINOPHIL NFR BLD AUTO: 4.2 %
ERYTHROCYTE [DISTWIDTH] IN BLOOD BY AUTOMATED COUNT: 13.6 % (ref 11–15)
EST. AVERAGE GLUCOSE BLD GHB EST-MCNC: 140 MG/DL (ref 68–126)
FASTING PATIENT LIPID ANSWER: YES
FASTING STATUS PATIENT QL REPORTED: YES
GLOBULIN PLAS-MCNC: 3.5 G/DL (ref 2.8–4.4)
GLUCOSE BLD-MCNC: 105 MG/DL (ref 70–99)
GLUCOSE UR-MCNC: NORMAL MG/DL
HBA1C MFR BLD: 6.5 % (ref ?–5.7)
HCT VFR BLD AUTO: 41.1 %
HDLC SERPL-MCNC: 47 MG/DL (ref 40–59)
HGB BLD-MCNC: 13 G/DL
HGB UR QL STRIP.AUTO: NEGATIVE
IMM GRANULOCYTES # BLD AUTO: 0.01 X10(3) UL (ref 0–1)
IMM GRANULOCYTES NFR BLD: 0.2 %
KETONES UR-MCNC: NEGATIVE MG/DL
LDLC SERPL CALC-MCNC: 83 MG/DL (ref ?–100)
LEUKOCYTE ESTERASE UR QL STRIP.AUTO: 250
LYMPHOCYTES # BLD AUTO: 2.13 X10(3) UL (ref 1–4)
LYMPHOCYTES NFR BLD AUTO: 37.5 %
MCH RBC QN AUTO: 28.2 PG (ref 26–34)
MCHC RBC AUTO-ENTMCNC: 31.6 G/DL (ref 31–37)
MCV RBC AUTO: 89.2 FL
MONOCYTES # BLD AUTO: 0.54 X10(3) UL (ref 0.1–1)
MONOCYTES NFR BLD AUTO: 9.5 %
NEUTROPHILS # BLD AUTO: 2.73 X10 (3) UL (ref 1.5–7.7)
NEUTROPHILS # BLD AUTO: 2.73 X10(3) UL (ref 1.5–7.7)
NEUTROPHILS NFR BLD AUTO: 48.1 %
NITRITE UR QL STRIP.AUTO: NEGATIVE
NONHDLC SERPL-MCNC: 100 MG/DL (ref ?–130)
OSMOLALITY SERPL CALC.SUM OF ELEC: 290 MOSM/KG (ref 275–295)
PH UR: 6.5 [PH] (ref 5–8)
PLATELET # BLD AUTO: 284 10(3)UL (ref 150–450)
POTASSIUM SERPL-SCNC: 4.5 MMOL/L (ref 3.5–5.1)
PROT SERPL-MCNC: 7.6 G/DL (ref 5.7–8.2)
PROT UR-MCNC: NEGATIVE MG/DL
RBC # BLD AUTO: 4.61 X10(6)UL
SODIUM SERPL-SCNC: 139 MMOL/L (ref 136–145)
SP GR UR STRIP: 1.03 (ref 1–1.03)
TRIGL SERPL-MCNC: 89 MG/DL (ref 30–149)
UROBILINOGEN UR STRIP-ACNC: NORMAL
VIT D+METAB SERPL-MCNC: 27.9 NG/ML (ref 30–100)
VLDLC SERPL CALC-MCNC: 14 MG/DL (ref 0–30)
WBC # BLD AUTO: 5.7 X10(3) UL (ref 4–11)

## 2024-03-16 PROCEDURE — 81001 URINALYSIS AUTO W/SCOPE: CPT | Performed by: FAMILY MEDICINE

## 2024-03-16 PROCEDURE — 85025 COMPLETE CBC W/AUTO DIFF WBC: CPT | Performed by: FAMILY MEDICINE

## 2024-03-16 PROCEDURE — 80053 COMPREHEN METABOLIC PANEL: CPT | Performed by: INTERNAL MEDICINE

## 2024-03-16 PROCEDURE — 83036 HEMOGLOBIN GLYCOSYLATED A1C: CPT | Performed by: FAMILY MEDICINE

## 2024-03-16 PROCEDURE — 82306 VITAMIN D 25 HYDROXY: CPT

## 2024-03-16 PROCEDURE — 36415 COLL VENOUS BLD VENIPUNCTURE: CPT | Performed by: INTERNAL MEDICINE

## 2024-03-16 PROCEDURE — 80061 LIPID PANEL: CPT | Performed by: INTERNAL MEDICINE

## 2024-03-19 ENCOUNTER — HOSPITAL ENCOUNTER (OUTPATIENT)
Dept: BONE DENSITY | Facility: HOSPITAL | Age: 64
Discharge: HOME | End: 2024-03-19
Attending: FAMILY MEDICINE
Payer: MEDICARE

## 2024-03-19 DIAGNOSIS — Z78.0 POSTMENOPAUSAL: ICD-10-CM

## 2024-03-19 PROCEDURE — 77080 DXA BONE DENSITY AXIAL: CPT | Performed by: FAMILY MEDICINE

## 2024-03-20 ENCOUNTER — TELEPHONE (OUTPATIENT)
Dept: INTERNAL MEDICINE CLINIC | Facility: CLINIC | Age: 64
End: 2024-03-20

## 2024-03-20 DIAGNOSIS — K44.9 HIATAL HERNIA: ICD-10-CM

## 2024-03-20 DIAGNOSIS — K21.9 GASTROESOPHAGEAL REFLUX DISEASE WITHOUT ESOPHAGITIS: ICD-10-CM

## 2024-03-20 RX ORDER — PANTOPRAZOLE SODIUM 40 MG/1
40 TABLET, DELAYED RELEASE ORAL
Qty: 90 TABLET | Refills: 1 | Status: SHIPPED | OUTPATIENT
Start: 2024-03-20

## 2024-03-20 NOTE — TELEPHONE ENCOUNTER
Patient is calling asking if the medication pantoprazole 40 MG Oral Tab EC can be send to mail order pharmacy.     She still has refills on file.       Please advise.

## 2024-03-30 ENCOUNTER — HOSPITAL ENCOUNTER (OUTPATIENT)
Dept: CT IMAGING | Age: 64
Discharge: HOME | End: 2024-03-30
Attending: FAMILY MEDICINE

## 2024-03-30 DIAGNOSIS — Z13.6 SCREENING FOR CARDIOVASCULAR CONDITION: ICD-10-CM

## 2024-04-15 ENCOUNTER — OFFICE VISIT (OUTPATIENT)
Dept: INTERNAL MEDICINE CLINIC | Facility: CLINIC | Age: 64
End: 2024-04-15
Payer: COMMERCIAL

## 2024-04-15 VITALS
SYSTOLIC BLOOD PRESSURE: 132 MMHG | HEART RATE: 66 BPM | OXYGEN SATURATION: 96 % | WEIGHT: 199 LBS | HEIGHT: 60.63 IN | TEMPERATURE: 98 F | BODY MASS INDEX: 38.06 KG/M2 | DIASTOLIC BLOOD PRESSURE: 70 MMHG

## 2024-04-15 DIAGNOSIS — M81.0 AGE-RELATED OSTEOPOROSIS WITHOUT CURRENT PATHOLOGICAL FRACTURE: ICD-10-CM

## 2024-04-15 DIAGNOSIS — E78.00 HYPERCHOLESTEREMIA: ICD-10-CM

## 2024-04-15 DIAGNOSIS — E11.9 TYPE 2 DIABETES MELLITUS WITHOUT COMPLICATION, WITHOUT LONG-TERM CURRENT USE OF INSULIN (HCC): ICD-10-CM

## 2024-04-15 DIAGNOSIS — Z12.31 ENCOUNTER FOR SCREENING MAMMOGRAM FOR MALIGNANT NEOPLASM OF BREAST: Primary | ICD-10-CM

## 2024-04-15 DIAGNOSIS — I10 ESSENTIAL HYPERTENSION: ICD-10-CM

## 2024-04-15 RX ORDER — METOPROLOL SUCCINATE 25 MG/1
12.5 TABLET, EXTENDED RELEASE ORAL DAILY
Qty: 45 TABLET | Refills: 1 | Status: SHIPPED | OUTPATIENT
Start: 2024-04-15

## 2024-04-15 RX ORDER — ALENDRONATE SODIUM 70 MG/1
70 TABLET ORAL
Qty: 12 TABLET | Refills: 3 | Status: SHIPPED | OUTPATIENT
Start: 2024-04-15

## 2024-04-15 NOTE — PROGRESS NOTES
HPI:     Chief Complaint   Patient presents with    Follow Up To Mammogram       Rosa Guillory is a 63 year old female presenting for:  HTN/HLD  -Taking meds as prescribed-tolerating well without SEs.   -Denies CP, Palpitations, Dizziness, leg edema, SOB, LOC, Has    GERD-> stable    Fibromyalgia-> stable    Depression-> stable     DM-> no wounds in feet. asymptomatic            Results for orders placed or performed in visit on 03/16/24   Vitamin D, 25-Hydroxy   Result Value Ref Range    Vitamin D, 25OH, Total 27.9 (L) 30.0 - 100.0 ng/mL       Labs:   Lab Results   Component Value Date/Time    A1C 6.5 (H) 03/16/2024 07:40 AM      Lab Results   Component Value Date/Time    CHOLEST 147 03/16/2024 07:40 AM    HDL 47 03/16/2024 07:40 AM    TRIG 89 03/16/2024 07:40 AM    LDL 83 03/16/2024 07:40 AM    NONHDLC 100 03/16/2024 07:40 AM       Lab Results   Component Value Date/Time     (H) 03/16/2024 07:40 AM     03/16/2024 07:40 AM    K 4.5 03/16/2024 07:40 AM     03/16/2024 07:40 AM    CO2 27.0 03/16/2024 07:40 AM    CREATSERUM 0.64 03/16/2024 07:40 AM    CA 9.1 03/16/2024 07:40 AM    ALB 4.1 03/16/2024 07:40 AM    TP 7.6 03/16/2024 07:40 AM    ALKPHO 110 03/16/2024 07:40 AM    AST 23 03/16/2024 07:40 AM    ALT 22 03/16/2024 07:40 AM    BILT 0.3 03/16/2024 07:40 AM    TSH 2.740 07/16/2022 08:05 AM          Medications:  Current Outpatient Medications   Medication Sig Dispense Refill    metoprolol succinate ER 25 MG Oral Tablet 24 Hr Take 0.5 tablets (12.5 mg total) by mouth daily. 45 tablet 1    alendronate 70 MG Oral Tab Take 1 tablet (70 mg total) by mouth every 7 days. 12 tablet 3    pantoprazole 40 MG Oral Tab EC Take 1 tablet (40 mg total) by mouth before breakfast. 90 tablet 1    ergocalciferol 1.25 MG (04348 UT) Oral Cap Take 1 capsule (50,000 Units total) by mouth once a week. 12 capsule 0    aspirin (ASPIRIN LOW DOSE) 81 MG Oral Tab EC Take 1 tablet (81 mg total) by mouth daily. 90 tablet 2     Benzocaine-Menthol 15-20 MG Mouth/Throat Lozenge Use as directed 1 lozenge in the mouth or throat every 4 (four) hours. 16 lozenge 0    pravastatin 20 MG Oral Tab Take 1 tablet (20 mg total) by mouth nightly. 90 tablet 3    gabapentin 300 MG Oral Cap Take 1 capsule (300 mg total) by mouth nightly. 90 capsule 3    PARoxetine 10 MG Oral Tab Take 1 tablet (10 mg total) by mouth every morning. 90 tablet 3    NAPROXEN 500 MG Oral Tab TAKE 1 TABLET BY MOUTH TWICE A DAY AS NEEDED 60 tablet 0    Cholecalciferol (VITAMIN D) 50 MCG (2000 UT) Oral Cap Take by mouth.      Cyanocobalamin (B-12 OR) Take 1 tablet by mouth daily.         Past Medical History:    Anxiety state, unspecified    Calcified granuloma of lung (HCC)    Carpal tunnel syndrome on both sides    Cataract    Essential hypertension    Fibromyalgia    Hyperlipidemia    Meibomian gland dysfunction    Schirmer 28mm/26mm    Obesity    Osteoarthritis    Sleep apnea    Visual impairment         Past Surgical History:   Procedure Laterality Date    Carpal tunnel release Right 2006    Carpal tunnel release Left 10/14/2014    Knee arthroscopy Left 01/24/2018    partial medial and lateral meniscectomies     No Known Allergies   Social History:  Social History     Socioeconomic History    Marital status:    Tobacco Use    Smoking status: Never    Smokeless tobacco: Never   Vaping Use    Vaping status: Never Used   Substance and Sexual Activity    Alcohol use: No    Drug use: No    Sexual activity: Never   Other Topics Concern    Currently spends a great deal of time in the sun No    Past Sunlamp Treatments for Acne No    History of tanning No    Hx of Spending Great Deal of Time in Sun No    Bad sunburns in the past No    Tanning Salons in the Past No    Hx of Radiation Treatments No    Regular use of sun block No    Caffeine Concern No    Exercise Yes    Bike Helmet Yes     Comment: HEP    Reaction to local anesthetic No   Social History Narrative    The patient  uses the following assistive device(s):  quad cane.      The patient does live in a home with stairs.      Family History:  Family History   Problem Relation Age of Onset    Hypertension Father     Heart Disease Father     Hypertension Mother     Arrhythmia Mother     Cancer Sister         cervical CA    Hypertension Sister     Hypertension Sister     Cancer Sister     Polyps Sister     Other (Other) Maternal Grandfather     Other (Other) Paternal Grandmother     Other (Other) Paternal Grandfather     No Known Problems Maternal Grandmother     No Known Problems Daughter     No Known Problems Son     No Known Problems Brother     No Known Problems Brother     No Known Problems Brother     No Known Problems Sister     No Known Problems Self     No Known Problems Maternal Aunt     No Known Problems Paternal Aunt     No Known Problems Maternal Cousin Female     No Known Problems Maternal Cousin Male     No Known Problems Paternal Cousin Female     No Known Problems Paternal Cousin Male     No Known Problems Other     Breast Cancer Neg     Ovarian Cancer Neg     DCIS Neg     LCIS Neg     BRCA gene + Neg     Ashkenazi Jew Descent Neg           REVIEW OF SYSTEMS:   Review of Systems   Constitutional:  Negative for fatigue and fever.   Respiratory:  Negative for cough and shortness of breath.    Cardiovascular:  Negative for chest pain, palpitations and leg swelling.   Gastrointestinal:  Negative for vomiting, abdominal pain, diarrhea and constipation.   Musculoskeletal:  Positive for myalgias and joint pain.   Neurological:  Negative for headaches.            PHYSICAL EXAM:   /70   Pulse 66   Temp 98.2 °F (36.8 °C)   Ht 5' 0.63\" (1.54 m)   Wt 199 lb (90.3 kg)   SpO2 96%   BMI 38.06 kg/m²  Estimated body mass index is 38.06 kg/m² as calculated from the following:    Height as of this encounter: 5' 0.63\" (1.54 m).    Weight as of this encounter: 199 lb (90.3 kg).     Wt Readings from Last 3 Encounters:    04/15/24 199 lb (90.3 kg)   10/16/23 194 lb (88 kg)   06/20/23 196 lb (88.9 kg)       Physical Exam   Vitals reviewed.   Constitutional: She appears well-developed. No distress.   Cardiovascular:  Normal rate, regular rhythm and normal heart sounds.            No murmur heard.   Edema not present.  Pulmonary/Chest: Effort normal and breath sounds normal. No respiratory distress.   Abdominal: Soft. Bowel sounds are normal. She exhibits no distension. There is no abdominal tenderness.   Neurological: No cranial nerve deficit.           ASSESSMENT AND PLAN:   Patient is a 63 year old female who presents primarily presents for:    Diagnoses and all orders for this visit:    Encounter for screening mammogram for malignant neoplasm of breast  -     [facility] LOVE 2D+3D SCREENING BILAT (CPT=77067/88424); Future    Essential hypertension  -     metoprolol succinate ER 25 MG Oral Tablet 24 Hr; Take 0.5 tablets (12.5 mg total) by mouth daily.  -stable; controlled  -continue meds  -watch salt intake, regular exercise, DASH/heart healthy eating  -monitor home BP regularly and maintain log; if elevated reading >160/100 notify Md.    Hypercholesteremia  CPM    Type 2 diabetes mellitus without complication, without long-term current use of insulin (HCC)  -     OPHTHALMOLOGY - INTERNAL    Diabetic control is well controlled  Last A1c value was 6.5% done 3/16/2024.    Recommendations are:   Will CPM: DIET CONTROL; declined meds at this time as well as pneumo vaccine  Advised weight and diabetic/lipid management with carbohydrate controlled ADA and/or low fat/cholesterol DASH diet and exercise (3 times a week for 30+ minutes each time)  Refer to Ophthalmology annually for routine eye exam  Check feet daily.  Podiatry evaluation prn.      Age-related osteoporosis without current pathological fracture  -     alendronate 70 MG Oral Tab; Take 1 tablet (70 mg total) by mouth every 7 days.  -SE discussed  -lifestyle changes discussed                   Return in about 6 months (around 10/15/2024) for physical.  Patient indicates understanding of the above recommendations and agrees to the above plan.  Reasurrance and education provided. All questions answered.  Notified to call with any questions, complications, allergies, or worsening or changing symptoms as well as any side effects or complications from the treatments .  Red flags/ ER precautions discussed.    Spent 30 minutes including chart review, reviewing appropriate medical history, evaluating patient, discussing treatment options, counseling and education (diet and exercise), ordering appropriate diagnostic tests and completing documentation.          Meds & Refills for this Visit:  Requested Prescriptions     Signed Prescriptions Disp Refills    metoprolol succinate ER 25 MG Oral Tablet 24 Hr 45 tablet 1     Sig: Take 0.5 tablets (12.5 mg total) by mouth daily.    alendronate 70 MG Oral Tab 12 tablet 3     Sig: Take 1 tablet (70 mg total) by mouth every 7 days.       No orders of the defined types were placed in this encounter.      Imaging & Consults:  OPHTHALMOLOGY - INTERNAL  [facility] LOVE 2D+3D SCREENING BILAT (CPT=77067/01808)    Health Maintenance:  Influenza Vaccine(1) due on 09/01/2019  Annual Depression Screen due on 04/16/2020  Annual Physical due on 04/23/2020  Mammogram due on 06/01/2020  Pap Smear,5 Years due on 12/08/2020  Colonoscopy due on 09/08/2025      Carolann Horton MD

## 2024-05-18 ENCOUNTER — HOSPITAL ENCOUNTER (OUTPATIENT)
Age: 64
Discharge: HOME | End: 2024-05-18

## 2024-05-18 VITALS
RESPIRATION RATE: 18 BRPM | DIASTOLIC BLOOD PRESSURE: 53 MMHG | TEMPERATURE: 97 F | SYSTOLIC BLOOD PRESSURE: 149 MMHG | OXYGEN SATURATION: 97 % | HEART RATE: 57 BPM

## 2024-05-18 DIAGNOSIS — H10.33 ACUTE BACTERIAL CONJUNCTIVITIS OF BOTH EYES: ICD-10-CM

## 2024-05-18 DIAGNOSIS — R09.81 SINUS CONGESTION: Primary | ICD-10-CM

## 2024-05-18 LAB — S PYO AG THROAT QL: NEGATIVE

## 2024-05-18 RX ORDER — CETIRIZINE HYDROCHLORIDE 10 MG/1
10 TABLET ORAL DAILY
Qty: 30 TABLET | Refills: 0 | Status: SHIPPED | OUTPATIENT
Start: 2024-05-18 | End: 2024-06-17

## 2024-05-18 RX ORDER — FLUTICASONE PROPIONATE 50 MCG
2 SPRAY, SUSPENSION (ML) NASAL DAILY
Qty: 16 G | Refills: 0 | Status: SHIPPED | OUTPATIENT
Start: 2024-05-18 | End: 2024-06-17

## 2024-05-18 RX ORDER — POLYMYXIN B SULFATE AND TRIMETHOPRIM 1; 10000 MG/ML; [USP'U]/ML
1 SOLUTION OPHTHALMIC
Qty: 10 ML | Refills: 0 | Status: SHIPPED | OUTPATIENT
Start: 2024-05-18 | End: 2024-05-23

## 2024-05-18 NOTE — ED PROVIDER NOTES
Patient Seen in: [facility]      History     Chief Complaint   Patient presents with    Sore Throat    Eye Problem     Stated Complaint: eye problem; nose pain; throat pain    Subjective:   HPI    This is a well-appearing 63-year-old female with history of hyperlipidemia, fibromyalgia, hypertension who presents with a chief complaint of sore throat, bilateral eye redness and drainage and a mild sore throat with sinus pressure which started yesterday.  No posterior neck pain or neck stiffness.  No rash.  Continue to have good appetite.  No nausea, vomiting or diarrhea.  No visual changes.  No foreign body sensation.  Does not wear contacts.  Not attempted any over-the-counter medication.    Objective:   No pertinent past medical history.            No pertinent past surgical history.              No pertinent social history.            Review of Systems   HENT:  Positive for sinus pressure, sinus pain and sore throat.    Eyes:  Positive for discharge and redness.   All other systems reviewed and are negative.      Positive for stated complaint: eye problem; nose pain; throat pain  Other systems are as noted in HPI.  Constitutional and vital signs reviewed.      All other systems reviewed and negative except as noted above.    Physical Exam     ED Triage Vitals [05/18/24 0806]   /53   Pulse 57   Resp 18   Temp 97.2 °F (36.2 °C)   Temp src Temporal   SpO2 97 %   O2 Device None (Room air)       Current Vitals:   Vital Signs  BP: 149/53  Pulse: 57  Resp: 18  Temp: 97.2 °F (36.2 °C)  Temp src: Temporal    Oxygen Therapy  SpO2: 97 %  O2 Device: None (Room air)      Physical Exam  Vitals and nursing note reviewed.   Constitutional:       General: She is awake. She is not in acute distress.     Appearance: Normal appearance. She is not ill-appearing, toxic-appearing or diaphoretic.   HENT:      Head: Normocephalic and atraumatic.      Right Ear: Tympanic membrane, ear canal and external ear normal.       Left Ear: Tympanic membrane, ear canal and external ear normal.      Nose: Mucosal edema present.      Right Sinus: Maxillary sinus tenderness present.      Left Sinus: Maxillary sinus tenderness present.      Mouth/Throat:      Lips: Pink.      Mouth: Mucous membranes are moist.      Pharynx: Oropharynx is clear. Uvula midline.   Eyes:      General: Lids are normal.         Right eye: Discharge present.         Left eye: Discharge present.     Extraocular Movements: Extraocular movements intact.      Conjunctiva/sclera:      Right eye: Right conjunctiva is injected.      Left eye: Left conjunctiva is injected.      Pupils: Pupils are equal, round, and reactive to light.      Comments: No eyelid erythema or swelling.  Able to open and close lids without difficulty.   Cardiovascular:      Rate and Rhythm: Normal rate and regular rhythm.      Pulses: Normal pulses.      Heart sounds: Normal heart sounds.   Pulmonary:      Effort: Pulmonary effort is normal.      Breath sounds: Normal breath sounds and air entry. No stridor, decreased air movement or transmitted upper airway sounds.   Musculoskeletal:      Cervical back: Full passive range of motion without pain and normal range of motion.   Skin:     General: Skin is warm and dry.      Capillary Refill: Capillary refill takes less than 2 seconds.   Neurological:      General: No focal deficit present.      Mental Status: She is alert and oriented to person, place, and time.   Psychiatric:         Mood and Affect: Mood normal.         Behavior: Behavior normal. Behavior is cooperative.         Thought Content: Thought content normal.         Judgment: Judgment normal.       ED Course     Labs Reviewed   POCT RAPID STREP - Normal     Strep and reevaluate.    Rapid strep negative.  MDM     Medical Decision Making  Patient is well-appearing on exam, exam as noted above.  Clear speech, no drooling.  Differential diagnosis including but not limited to viral versus bacterial  conjunctivitis, viral versus bacterial sinusitis, versus bacterial pharyngitis.  Discussed with the patient that strep here was negative.  Discussed with patient we will treat for bacterial conjunctivitis at this time.  Also encouraged to use sinus rinse, Flonase and Zyrtec daily.  No clear indication for oral antibiotics at this time uses most likely viral in etiology.  Patient verbalized plan of care and states understanding.    Problems Addressed:  Acute bacterial conjunctivitis of both eyes: acute illness or injury  Sinus congestion: acute illness or injury    Amount and/or Complexity of Data Reviewed  ECG/medicine tests: ordered and independent interpretation performed. Decision-making details documented in ED Course.    Risk  OTC drugs.  Prescription drug management.        Disposition and Plan     Clinical Impression:  1. Sinus congestion    2. Acute bacterial conjunctivitis of both eyes         Disposition:  Discharge  5/18/2024  8:26 am    Follow-up:  Carolann Reese MD  [address] [facility] [address]  [facility] [address]  [phone]                Medications Prescribed:  Discharge Medication List as of 5/18/2024  8:26 AM        START taking these medications    Details   fluticasone propionate 50 MCG/ACT Nasal Suspension 2 sprays by Nasal route daily., Normal, Disp-16 g, R-0      cetirizine 10 MG Oral Tab Take 1 tablet (10 mg total) by mouth daily., Normal, Disp-30 tablet, R-0

## 2024-05-18 NOTE — DISCHARGE INSTRUCTIONS
Please take medication as prescribed.  Sinus rinse daily.  Viruses commonly can last 7 to 10 days.  If you start having worsening symptoms please follow-up with your primary care provider.

## 2024-05-28 DIAGNOSIS — K44.9 HIATAL HERNIA: ICD-10-CM

## 2024-05-28 DIAGNOSIS — K21.9 GASTROESOPHAGEAL REFLUX DISEASE WITHOUT ESOPHAGITIS: ICD-10-CM

## 2024-05-28 RX ORDER — PANTOPRAZOLE SODIUM 40 MG/1
40 TABLET, DELAYED RELEASE ORAL
Qty: 90 TABLET | Refills: 0 | Status: SHIPPED | OUTPATIENT
Start: 2024-05-28 | End: 2024-08-12

## 2024-06-03 NOTE — TELEPHONE ENCOUNTER
Requested Prescriptions     Pending Prescriptions Disp Refills    ERGOCALCIFEROL 1.25 MG (27129 UT) Oral Cap [Pharmacy Med Name: VITAMIN D2 1.25MG(50,000 UNIT)] 12 capsule 0     Sig: TAKE 1 CAPSULE BY MOUTH ONE TIME PER WEEK     LF:3/18/24  LOV: 6/20/23  Future Appointments   Date Time Provider Department Center   6/17/2024  8:40 AM Roderick Pisano MD [facility]   7/29/2024 11:20 AM [facility]   7/31/2024  8:20 AM Carolann Reese MD [facility]     Labs:   Component      Latest Ref Rng 3/16/2024   VITAMIN D, 25-OH, TOTAL      30.0 - 100.0 ng/mL 27.9 (L)       Legend:  (L) Low      Summary:  1. Cont. Gabapentin 300mg at night   2. Diclofenac 1 % gel - or VOLTAREN GEL 1% for right knee - upt four times a day   3. Check left knee xray -   4. Consider tyelnol 500-650mg 1-2 tablets a day -   5. Check labs in august 6. Return to clinic in 12 months.               Roderick Pisano MD  6/20/2023   4:32 PM  - [place]ed [place]- [place]  through Epic

## 2024-06-05 RX ORDER — ERGOCALCIFEROL 1.25 MG/1
50000 CAPSULE ORAL WEEKLY
Qty: 12 CAPSULE | Refills: 0 | Status: SHIPPED | OUTPATIENT
Start: 2024-06-05

## 2024-06-05 NOTE — PATIENT INSTRUCTIONS
Manejo de aaliyah reacción al vanda, hiedra o zumaque venenosos  Si entra en contacto con el urushiol    Si piensa que ha tocado el urushiol, la resina grasa presente en las plantas hiedra venenosa, vanda venenoso o zumaque venenoso, lávese la trevor afectada Veronica irritación leve puede enrojecer, hincharse y producir Anaya Ghazi. Podrían formarse ronchas en veronica línea en la piel, en la trevor que tocó la planta. Si tiene veronica erupción grave, moncada picazón podría empeorar, formar ampollas y gotear.  Si esto sucede, busque tr Xray Hand 3 Views, Left

## 2024-07-25 ENCOUNTER — PATIENT OUTREACH (OUTPATIENT)
Dept: CASE MANAGEMENT | Age: 64
End: 2024-07-25

## 2024-07-25 NOTE — PROGRESS NOTES
Patient identified with a potential need for Chronic Care Management services.  Called patient to introduce self and availability of Chronic Care Management services.  Patient informed about the following service elements:  Health information sharing - complying with all laws related to privacy and security of their health information  Patient/Insurance Cost sharing - includes deductible and any ongoing copays and/or coinsurance  Only one provider can bill for this service monthly  Patient right to discontinue services at any time for any reason effective last day of current month  Patient verbally accepts to participate in Chronic Care Management services and any associated charges.

## 2024-07-29 ENCOUNTER — HOSPITAL ENCOUNTER (OUTPATIENT)
Dept: MAMMOGRAPHY | Facility: HOSPITAL | Age: 64
Discharge: HOME | End: 2024-07-29
Attending: FAMILY MEDICINE
Payer: MEDICARE

## 2024-07-29 ENCOUNTER — PATIENT OUTREACH (OUTPATIENT)
Dept: CASE MANAGEMENT | Age: 64
End: 2024-07-29

## 2024-07-29 DIAGNOSIS — H35.033 HYPERTENSIVE RETINOPATHY OF BOTH EYES: ICD-10-CM

## 2024-07-29 DIAGNOSIS — M15.0 PRIMARY OSTEOARTHRITIS INVOLVING MULTIPLE JOINTS: ICD-10-CM

## 2024-07-29 DIAGNOSIS — E11.9 TYPE 2 DIABETES MELLITUS WITHOUT COMPLICATION, WITHOUT LONG-TERM CURRENT USE OF INSULIN (HCC): Primary | ICD-10-CM

## 2024-07-29 DIAGNOSIS — K76.0 HEPATIC STEATOSIS: ICD-10-CM

## 2024-07-29 DIAGNOSIS — I10 ESSENTIAL HYPERTENSION: ICD-10-CM

## 2024-07-29 DIAGNOSIS — F32.4 MAJOR DEPRESSIVE DISORDER WITH SINGLE EPISODE, IN PARTIAL REMISSION (HCC): ICD-10-CM

## 2024-07-29 DIAGNOSIS — Z12.31 ENCOUNTER FOR SCREENING MAMMOGRAM FOR MALIGNANT NEOPLASM OF BREAST: ICD-10-CM

## 2024-07-29 DIAGNOSIS — M79.7 FIBROMYALGIA: ICD-10-CM

## 2024-07-29 PROCEDURE — 77067 SCR MAMMO BI INCL CAD: CPT | Performed by: FAMILY MEDICINE

## 2024-07-29 PROCEDURE — 77063 BREAST TOMOSYNTHESIS BI: CPT | Performed by: FAMILY MEDICINE

## 2024-07-29 NOTE — TELEPHONE ENCOUNTER
LOV: 6/20/23  Future Appointments   Date Time Provider Department Center   7/31/2024  8:20 AM Carolann Reese MD [facility]   10/7/2024 11:20 AM Roderick Pisano MD [facility]   Labs:Vitamin D:27.9  3/16/24    Summary:  1. Cont. Gabapentin 300mg at night   2. Diclofenac 1 % gel - or VOLTAREN GEL 1% for right knee - upt four times a day   3. Check left knee xray -   4. Consider tyelnol 500-650mg 1-2 tablets a day -   5. Check labs in august 6. Return to clinic in 12 months.               Roderick Pisano MD  6/20/2023   4:32 PM

## 2024-07-29 NOTE — PROGRESS NOTES
Spoke to Rosa at length about Chronic Care Management, current care plan and performed CCM call with Rosa reviewed meds and compliance.     Interventions for following categories based on Chronic Care Management  initial call.      I want to know a little about you.  What do you do in your spare time? The patient is enjoying the summer and likes to gardening in her yard.  Are you retired or what do you do for a living? The patient is on disability and was disabled due to bilateral carpal tunnel syndrome.    Social support:  Do you live with someone? The patient lives with her daughter and her grandson.  Do you have someone you can turn to when you are very stressed and or need help? Yes  Who? Her family, she likes to talk to them on the phone.  Do you have someone you check in with or talk to on a regular basis? Yes  Are you responsible for anyone's care? The patient is not the primary care giver to anyone.  How are you doing with that? N/A  Do you feel you take time for yourself too? N/A   Do you have any pets at home? The patient has a dog but doesn't know what breed he is, alls he knows is that is a boy and is medium size dog.    Let's talk about stress.  How much stress do you feel you have in your life? The patient claims to have medium to average stress.  What stress's you out? Her pains, her medical condition.  How do you manage this stress? The patient likes to talk to her family in [place] over the phone.    Nutrition: Let's talk about eating habits  Any special diet you are put on?  The patient was not put on any specific diet but tries to cook healthy and balanced diet to prevent complication with her diabetes.   Do you have any barriers to keeping with this diet? The patient claims to not have any barriers.  Do you eat three small meals a day? The patient eats between two to three meals a day.  Do you eat a variety of fruits and veggies? Yes  How do you stay hydrated? The patient does drink water but  feels sh can drink more than just 2-3 16oz bottles a day.    Exercise   Do you exercise? What do you do? The patient used to receive physical therapy and was thought to do home stretching exercises, which she does them.  How often? Daily  How long? 10-15 mins a day    Medication review:  Update medications with meds from other providers as well  Is there any reason you are unable to take your medications as prescribed? The patient is using medications as prescribed  Do you take them every day on time? Yes  Reasons why you don't? N/A  Do you have any questions about your medications? Side effects? Etc.? Patient has no questions about her medications or their side effects.     Meds: Detailed medication review with Rosa. Discussed with Rosa if any potential barriers are present that could prevent compliance with medication regimen. At this time, no barriers reported. Rosa reports is stable on all medications and denies experiencing any side effects.    Care Team:  Review specialists and add to care team.    Disease specific:   Do you feel you have a good understanding of your chronic conditions? The patient feels she has knowledge of her health issues.    Do you have chronic pain: Yes, intermittent but often.    Provider Goals:  What would you say is your biggest concerns about your health? The patient wants to be healthier and work more on her stretching exercises.  Have any of your providers set forth a goal they wanted you to achieve? Yes  What steps have you taken? The patient takes medications as prescribed and does stretch exercises daily.  Any barriers? None  Example: eating breakfast daily, walk around the block once a week, cut down on portions for one meal a day.  Etc.    Follow up:  You can call me anytime you have a question or need help with achieving your goals.  I will follow up with you in a few weeks to see how you are doing and if we need to change anything to help you meet your needs.  Remember:  what works for one person may not always work for another. We will continue to work on what will help you meet your needs.    Care Plan: Reviewed and updated where needed.  Health  Follow-up:  Time Spent This Encounter Total: 27 min medical record review, telephone communication, care plan updates where needed, and education. Provided acknowledgment and validation to patient's concerns.   Monthly Minute Total including today: 27    Physical assessment, complete health history, and need for Chronic Care Management established by Carolann Horton MD.

## 2024-07-29 NOTE — TELEPHONE ENCOUNTER
The patient will be seen by Dr Pisano 10/07 but her vit d caps will be out in September, patient was wondering if a partial refill could be sent to her pharmacy or she has to wait until she gets seen.    Please inform patient of the outcome.    Thanks

## 2024-07-30 RX ORDER — ERGOCALCIFEROL 1.25 MG/1
50000 CAPSULE ORAL WEEKLY
Qty: 12 CAPSULE | Refills: 0 | OUTPATIENT
Start: 2024-07-30

## 2024-07-31 ENCOUNTER — OFFICE VISIT (OUTPATIENT)
Dept: INTERNAL MEDICINE CLINIC | Facility: CLINIC | Age: 64
End: 2024-07-31
Payer: COMMERCIAL

## 2024-07-31 VITALS
HEART RATE: 56 BPM | BODY MASS INDEX: 37.3 KG/M2 | TEMPERATURE: 99 F | DIASTOLIC BLOOD PRESSURE: 60 MMHG | HEIGHT: 60.63 IN | WEIGHT: 195 LBS | OXYGEN SATURATION: 96 % | SYSTOLIC BLOOD PRESSURE: 122 MMHG

## 2024-07-31 DIAGNOSIS — H25.13 AGE-RELATED NUCLEAR CATARACT OF BOTH EYES: ICD-10-CM

## 2024-07-31 DIAGNOSIS — Z00.00 ENCOUNTER FOR ANNUAL HEALTH EXAMINATION: Primary | ICD-10-CM

## 2024-07-31 DIAGNOSIS — M79.7 FIBROMYALGIA: ICD-10-CM

## 2024-07-31 DIAGNOSIS — I10 ESSENTIAL HYPERTENSION: ICD-10-CM

## 2024-07-31 DIAGNOSIS — Z98.890 S/P LATERAL MENISCUS REPAIR OF LEFT KNEE: ICD-10-CM

## 2024-07-31 DIAGNOSIS — K21.9 GASTROESOPHAGEAL REFLUX DISEASE WITHOUT ESOPHAGITIS: ICD-10-CM

## 2024-07-31 DIAGNOSIS — F32.4 MAJOR DEPRESSIVE DISORDER WITH SINGLE EPISODE, IN PARTIAL REMISSION (HCC): ICD-10-CM

## 2024-07-31 DIAGNOSIS — E66.01 CLASS 2 SEVERE OBESITY DUE TO EXCESS CALORIES WITH SERIOUS COMORBIDITY AND BODY MASS INDEX (BMI) OF 37.0 TO 37.9 IN ADULT (HCC): ICD-10-CM

## 2024-07-31 DIAGNOSIS — H02.889 MGD (MEIBOMIAN GLAND DYSFUNCTION): ICD-10-CM

## 2024-07-31 DIAGNOSIS — E11.9 TYPE 2 DIABETES MELLITUS WITHOUT COMPLICATION, WITHOUT LONG-TERM CURRENT USE OF INSULIN (HCC): ICD-10-CM

## 2024-07-31 DIAGNOSIS — K31.7 GASTRIC POLYPS: ICD-10-CM

## 2024-07-31 DIAGNOSIS — G47.61 PERIODIC LIMB MOVEMENT DISORDER (PLMD): ICD-10-CM

## 2024-07-31 DIAGNOSIS — H35.033 HYPERTENSIVE RETINOPATHY OF BOTH EYES: ICD-10-CM

## 2024-07-31 DIAGNOSIS — M15.0 PRIMARY OSTEOARTHRITIS INVOLVING MULTIPLE JOINTS: ICD-10-CM

## 2024-07-31 DIAGNOSIS — M47.816 SPONDYLOSIS OF LUMBAR REGION WITHOUT MYELOPATHY OR RADICULOPATHY: ICD-10-CM

## 2024-07-31 DIAGNOSIS — Z79.82 LONG TERM CURRENT USE OF ASPIRIN: ICD-10-CM

## 2024-07-31 DIAGNOSIS — K64.8 INTERNAL HEMORRHOIDS: ICD-10-CM

## 2024-07-31 DIAGNOSIS — M81.0 AGE-RELATED OSTEOPOROSIS WITHOUT CURRENT PATHOLOGICAL FRACTURE: ICD-10-CM

## 2024-07-31 DIAGNOSIS — Z98.890 S/P MEDIAL MENISCUS REPAIR OF LEFT KNEE: ICD-10-CM

## 2024-07-31 DIAGNOSIS — E78.00 HYPERCHOLESTEREMIA: ICD-10-CM

## 2024-07-31 DIAGNOSIS — G47.33 OSA (OBSTRUCTIVE SLEEP APNEA): ICD-10-CM

## 2024-07-31 DIAGNOSIS — K80.20 CALCULUS OF GALLBLADDER WITHOUT CHOLECYSTITIS WITHOUT OBSTRUCTION: ICD-10-CM

## 2024-07-31 DIAGNOSIS — K76.0 HEPATIC STEATOSIS: ICD-10-CM

## 2024-07-31 DIAGNOSIS — M41.80 DEXTROSCOLIOSIS: ICD-10-CM

## 2024-07-31 DIAGNOSIS — K44.9 HIATAL HERNIA: ICD-10-CM

## 2024-07-31 PROBLEM — R73.03 PREDIABETES: Status: RESOLVED | Noted: 2022-08-01 | Resolved: 2024-07-31

## 2024-07-31 PROBLEM — E53.8 VITAMIN B12 DEFICIENCY: Status: RESOLVED | Noted: 2019-01-16 | Resolved: 2024-07-31

## 2024-07-31 LAB
CREAT UR-SCNC: 109.5 MG/DL
HEMOGLOBIN A1C: 6.1 % (ref 4.3–5.6)
MICROALBUMIN UR-MCNC: 0.3 MG/DL
MICROALBUMIN/CREAT 24H UR-RTO: 2.7 UG/MG (ref ?–30)

## 2024-07-31 PROCEDURE — 83036 HEMOGLOBIN GLYCOSYLATED A1C: CPT | Performed by: FAMILY MEDICINE

## 2024-07-31 PROCEDURE — 99499 UNLISTED E&M SERVICE: CPT | Performed by: FAMILY MEDICINE

## 2024-07-31 PROCEDURE — 99214 OFFICE O/P EST MOD 30 MIN: CPT | Performed by: FAMILY MEDICINE

## 2024-07-31 PROCEDURE — 3078F DIAST BP <80 MM HG: CPT | Performed by: FAMILY MEDICINE

## 2024-07-31 PROCEDURE — 82570 ASSAY OF URINE CREATININE: CPT | Performed by: FAMILY MEDICINE

## 2024-07-31 PROCEDURE — 82043 UR ALBUMIN QUANTITATIVE: CPT | Performed by: FAMILY MEDICINE

## 2024-07-31 PROCEDURE — G0439 PPPS, SUBSEQ VISIT: HCPCS | Performed by: FAMILY MEDICINE

## 2024-07-31 PROCEDURE — 3074F SYST BP LT 130 MM HG: CPT | Performed by: FAMILY MEDICINE

## 2024-07-31 PROCEDURE — 3008F BODY MASS INDEX DOCD: CPT | Performed by: FAMILY MEDICINE

## 2024-07-31 PROCEDURE — 3044F HG A1C LEVEL LT 7.0%: CPT | Performed by: FAMILY MEDICINE

## 2024-07-31 PROCEDURE — 3061F NEG MICROALBUMINURIA REV: CPT | Performed by: FAMILY MEDICINE

## 2024-07-31 PROCEDURE — 96160 PT-FOCUSED HLTH RISK ASSMT: CPT | Performed by: FAMILY MEDICINE

## 2024-07-31 RX ORDER — PRAVASTATIN SODIUM 20 MG
20 TABLET ORAL NIGHTLY
Qty: 90 TABLET | Refills: 3 | Status: SHIPPED | OUTPATIENT
Start: 2024-07-31

## 2024-07-31 RX ORDER — METOPROLOL SUCCINATE 25 MG/1
12.5 TABLET, EXTENDED RELEASE ORAL DAILY
Qty: 45 TABLET | Refills: 1 | Status: SHIPPED | OUTPATIENT
Start: 2024-07-31

## 2024-07-31 RX ORDER — GABAPENTIN 300 MG/1
300 CAPSULE ORAL NIGHTLY
Qty: 90 CAPSULE | Refills: 3 | Status: SHIPPED | OUTPATIENT
Start: 2024-07-31

## 2024-07-31 RX ORDER — PAROXETINE 10 MG/1
10 TABLET, FILM COATED ORAL EVERY MORNING
Qty: 90 TABLET | Refills: 3 | Status: SHIPPED | OUTPATIENT
Start: 2024-07-31

## 2024-07-31 NOTE — PROGRESS NOTES
Subjective:   Rosa Guillory is a 63 year old female who presents for a MA AHA (Medicare Advantage Annual Health Assessment) and scheduled follow up of multiple significant but stable problems.       History/Other:   Fall Risk Assessment:   She has been screened for Falls and is High Risk. Fall Prevention information provided to patient in After Visit Summary.    Do you feel unsteady when standing or walking?: Yes  Do you worry about falling?: No  Have you fallen in the past year?: No     Cognitive Assessment:   She had a completely normal cognitive assessment - see flowsheet entries     Functional Ability/Status:   Rosa Guillory has some abnormal functions as listed below:  She has Vision problems based on screening of functional status. She has Walking problems based on screening of functional status. She has problems with Memory based on screening of functional status.       Depression Screening (PHQ):  PHQ-2 SCORE: 0  , done 7/26/2024        <5 minutes spent screening and counseling for depression    Advanced Directives:   She does NOT have a Living Will. [Do you have a living will?: No]  She does NOT have a Power of  for Health Care. [Do you have a healthcare power of ?: No]  Discussed Advance Care Planning with patient (and family/surrogate if present). Standard forms made available to patient in After Visit Summary.      Patient Active Problem List   Diagnosis    MGD (meibomian gland dysfunction)    Long term current use of aspirin    Primary osteoarthritis involving multiple joints    Fibromyalgia    Major depressive disorder with single episode, in partial remission (HCC)    IZAIAH (obstructive sleep apnea)    Periodic limb movement disorder (PLMD)    Age-related nuclear cataract of both eyes    Gastroesophageal reflux disease without esophagitis    Essential hypertension    Dextroscoliosis    Hypercholesteremia    Class 2 severe obesity due to excess calories with serious comorbidity and body  mass index (BMI) of 37.0 to 37.9 in adult (HCC)    Hypertensive retinopathy of both eyes    S/P medial meniscus repair of left knee    S/P lateral meniscus repair of left knee    Hiatal hernia    Internal hemorrhoids    Gastric polyps    Spondylosis of lumbar region without myelopathy or radiculopathy    Cholelithiasis    Hepatic steatosis    Age-related osteoporosis without current pathological fracture    Type 2 diabetes mellitus without complication, without long-term current use of insulin ([facility])     Allergies:  She has No Known Allergies.    Current Medications:  Outpatient Medications Marked as Taking for the 7/31/24 encounter (Office Visit) with Carolann Reese MD   Medication Sig    metoprolol succinate ER 25 MG Oral Tablet 24 Hr Take 0.5 tablets (12.5 mg total) by mouth daily.    gabapentin 300 MG Oral Cap Take 1 capsule (300 mg total) by mouth nightly.    pravastatin 20 MG Oral Tab Take 1 tablet (20 mg total) by mouth nightly.    PARoxetine 10 MG Oral Tab Take 1 tablet (10 mg total) by mouth every morning.    ERGOCALCIFEROL 1.25 MG (61602 UT) Oral Cap TAKE 1 CAPSULE BY MOUTH ONE TIME PER WEEK    pantoprazole 40 MG Oral Tab EC Take 1 tablet (40 mg total) by mouth before breakfast.    alendronate 70 MG Oral Tab Take 1 tablet (70 mg total) by mouth every 7 days.    aspirin (ASPIRIN LOW DOSE) 81 MG Oral Tab EC Take 1 tablet (81 mg total) by mouth daily.    Cyanocobalamin (B-12 OR) Take 1 tablet by mouth daily.        Medical History:  She  has a past medical history of Anxiety state, unspecified, Calcified granuloma of lung (HCC) (1/28/2015), Carpal tunnel syndrome on both sides, Cataract, Essential hypertension, Fibromyalgia, Hyperlipidemia, Meibomian gland dysfunction (07/12/14), Obesity, Osteoarthritis, Sleep apnea, and Visual impairment.  Surgical History:  She  has a past surgical history that includes carpal tunnel release (Right, 2006); carpal tunnel release (Left, 10/14/2014); and knee  arthroscopy (Left, 01/24/2018).   Family History:  Her family history includes Arrhythmia in her mother; Cancer in her sister and sister; Heart Disease in her father; Hypertension in her father, mother, sister, and sister; No Known Problems in her brother, brother, brother, daughter, maternal aunt, maternal cousin female, maternal cousin male, maternal grandmother, paternal aunt, paternal cousin female, paternal cousin male, self, sister, son, and another family member; Other in her maternal grandfather, paternal grandfather, and paternal grandmother; Polyps in her sister.  Social History:  She  reports that she has never smoked. She has never used smokeless tobacco. She reports that she does not drink alcohol and does not use drugs.    Tobacco:  She has never smoked tobacco.    CAGE Alcohol Screen:   CAGE screening score of 0 on 7/26/2024, showing low risk of alcohol abuse.      Patient Care Team:  Carolann Reese MD as PCP - General (Family Medicine)  Roderick Pisano MD as Consulting Physician (RHEUMATOLOGY)  Kimberley Watts RMA as [facility] Care Manager    Review of Systems   Constitutional:  Negative for chills, fatigue and fever.   Respiratory:  Negative for cough and shortness of breath.    Cardiovascular:  Negative for chest pain, palpitations and leg swelling.   Gastrointestinal:  Negative for abdominal pain, constipation, diarrhea and vomiting.   Musculoskeletal:  Positive for arthralgias.   Neurological:  Negative for headaches.          Objective:   Physical Exam  Vitals reviewed.   Constitutional:       General: She is not in acute distress.     Appearance: She is well-developed.   HENT:      Head: Normocephalic.      Right Ear: Tympanic membrane and external ear normal.      Left Ear: Tympanic membrane and external ear normal.   Eyes:      Conjunctiva/sclera: Conjunctivae normal.      Pupils: Pupils are equal, round, and reactive to light.   Neck:      Thyroid: No thyromegaly.   Cardiovascular:       Rate and Rhythm: Normal rate and regular rhythm.      Heart sounds: Normal heart sounds. No murmur heard.  Pulmonary:      Effort: Pulmonary effort is normal. No respiratory distress.      Breath sounds: Normal breath sounds.   Abdominal:      General: Bowel sounds are normal. There is no distension.      Palpations: Abdomen is soft.      Tenderness: There is no abdominal tenderness.   Musculoskeletal:         General: Normal range of motion.      Cervical back: Normal range of motion and neck supple.      Right lower leg: No edema.      Left lower leg: No edema.   Skin:     Findings: No rash.   Neurological:      General: No focal deficit present.      Cranial Nerves: No cranial nerve deficit.            /60   Pulse 56   Temp 98.8 °F (37.1 °C)   Ht 5' 0.63\" (1.54 m)   Wt 195 lb (88.5 kg)   SpO2 96%   BMI 37.30 kg/m²  Estimated body mass index is 37.3 kg/m² as calculated from the following:    Height as of this encounter: 5' 0.63\" (1.54 m).    Weight as of this encounter: 195 lb (88.5 kg).    Medicare Hearing Assessment:   Hearing Screening    Screening Method: Finger Rub  Finger Rub Result: Pass         Visual Acuity:   Right Eye Visual Acuity: Corrected Right Eye Chart Acuity: 20/20   Left Eye Visual Acuity: Corrected Left Eye Chart Acuity: 20/20   Both Eyes Visual Acuity: Corrected Both Eyes Chart Acuity: 20/20   Able To Tolerate Visual Acuity: Yes        Assessment & Plan:   Rosa Guillory is a 63 year old female who presents for a Medicare Assessment.     1. Encounter for annual health examination (Primary)  -     Pt reassured and all questions answered.  -     Age/sex specific preventive measures/immunizations reviewed and discussed with pt.  -     Pt counseled with regards to diet and exercise.    2. Type 2 diabetes mellitus without complication, without long-term current use of insulin (HCC)  -     POC Glycohemoglobin [51935]  -     Microalb/Creat Ratio, Random Urine    Diabetic control is well  controlled   Last A1c value was 6.5% done 3/16/2024.    Recommendations are:   Will CPM: DIET CONTROL. Declines medications. Also in denial of having DM and only having prediabetes. Discussed extensively criteria for DM with her last 2 A1cs.  Advised weight and diabetic/lipid management with carbohydrate controlled ADA and/or low fat/cholesterol DASH diet and exercise (3 times a week for 30+ minutes each time)  Refer to Ophthalmology annually for routine eye exam  Check feet daily.  Podiatry evaluation prn.      3. Essential hypertension  -     Metoprolol Succinate ER; Take 0.5 tablets (12.5 mg total) by mouth daily.  Dispense: 45 tablet; Refill: 1    -stable; controlled  -continue meds  -watch salt intake, regular exercise, DASH/heart healthy eating  -monitor home BP regularly and maintain log; if elevated reading >160/100 notify Md.    4. Hypercholesteremia  -     Pravastatin Sodium; Take 1 tablet (20 mg total) by mouth nightly.  Dispense: 90 tablet; Refill: 3  5. Class 2 severe obesity due to excess calories with serious comorbidity and body mass index (BMI) of 37.0 to 37.9 in adult (HCC)  -     Pt counseled with regards to diet and exercise.    6. IZAIAH (obstructive sleep apnea)  7. Periodic limb movement disorder (PLMD)  -noncompliant with CPAP. Advised to do so      8. Major depressive disorder with single episode, in partial remission (HCC)  -     PARoxetine HCl; Take 1 tablet (10 mg total) by mouth every morning.  Dispense: 90 tablet; Refill: 3  9. Age-related osteoporosis without current pathological fracture  -CPM    10. Dextroscoliosis  11. Spondylosis of lumbar region without myelopathy or radiculopathy  12. Primary osteoarthritis involving multiple joints  13. S/P lateral meniscus repair of left knee  14. S/P medial meniscus repair of left knee  -supportive care PRN    15. Fibromyalgia  -     Gabapentin; Take 1 capsule (300 mg total) by mouth nightly.  Dispense: 90 capsule; Refill: 3    -CPM with  rheum    16. Long term current use of aspirin  -CPM    17. Calculus of gallbladder without cholecystitis without obstruction  -asymptomatic; watchful waiting per pt preference    18. Gastric polyps  -continue f/u with specialist (GI) per instructed interval    19. Gastroesophageal reflux disease without esophagitis  20. Hiatal hernia  21. Hepatic steatosis  -dietary modifications and avoiding exacerbating foods advised    22. Internal hemorrhoids  -Pt counseled with regards to increased water and high fiber diet intake    23. Age-related nuclear cataract of both eyes  24. Hypertensive retinopathy of both eyes  25. MGD (meibomian gland dysfunction)  -continue f/u with specialist (optho) per instructed interval    The patient indicates understanding of these issues and agrees to the plan.  Reinforced healthy diet, lifestyle, and exercise.    Return in about 6 months (around 1/31/2025) for Diabetes follow-up.  Reasurrance and education provided. All questions answered. Red flags/ ER precautions discussed.    Spent 40 minutes (10min preventative and 30min acute/chronic) including chart review, reviewing appropriate medical history, evaluating patient, discussing treatment options, counseling and education (diet and exercise), ordering appropriate diagnostic tests and completing documentation.        Carolann Horton MD, 7/31/2024     Supplementary Documentation:   General Health:  In the past six months, have you lost more than 10 pounds without trying?: 2 - No  Has your appetite been poor?: No  Type of Diet: Other  How does the patient maintain a good energy level?: Stretching  How would you describe your daily physical activity?: Light  How would you describe your current health state?: Good  How do you maintain positive mental well-being?: Games  On a scale of 0 to 10, with 0 being no pain and 10 being severe pain, what is your pain level?: 0 - (None)  In the past six months, have you experienced urine  leakage?: 1-Yes  At any time do you feel concerned for the safety/well-being of yourself and/or your children, in your home or elsewhere?: No  Have you had any immunizations at another office such as Influenza, Hepatitis B, Tetanus, or Pneumococcal?: No    Health Maintenance   Topic Date Due    Diabetes Care Foot Exam  Never done    Diabetes Care: Microalb/Creat Ratio  Never done    Pneumococcal Vaccine: Birth to 64yrs (1 of 2 - PCV) Never done    Zoster Vaccines (1 of 2) Never done    Diabetes Care Dilated Eye Exam  08/15/2023    COVID-19 Vaccine (6 - 2023-24 season) 09/01/2023    MA Annual Health Assessment  01/01/2024    Annual Depression Screening  01/01/2024    Diabetes Care A1C  09/16/2024    Influenza Vaccine (1) 10/01/2024    Diabetes Care: GFR  03/16/2025    Mammogram  07/29/2025    Colorectal Cancer Screening  09/08/2025    Pap Smear  09/22/2025    DTaP,Tdap,and Td Vaccines (2 - Td or Tdap) 04/14/2028

## 2024-08-01 ENCOUNTER — TELEPHONE (OUTPATIENT)
Dept: INTERNAL MEDICINE CLINIC | Facility: CLINIC | Age: 64
End: 2024-08-01

## 2024-08-12 DIAGNOSIS — K44.9 HIATAL HERNIA: ICD-10-CM

## 2024-08-12 DIAGNOSIS — K21.9 GASTROESOPHAGEAL REFLUX DISEASE WITHOUT ESOPHAGITIS: ICD-10-CM

## 2024-08-12 RX ORDER — PANTOPRAZOLE SODIUM 40 MG/1
40 TABLET, DELAYED RELEASE ORAL
Qty: 90 TABLET | Refills: 0 | Status: SHIPPED | OUTPATIENT
Start: 2024-08-12 | End: 2024-11-13

## 2024-08-16 ENCOUNTER — PATIENT OUTREACH (OUTPATIENT)
Dept: CASE MANAGEMENT | Age: 64
End: 2024-08-16

## 2024-08-16 DIAGNOSIS — M15.0 PRIMARY OSTEOARTHRITIS INVOLVING MULTIPLE JOINTS: ICD-10-CM

## 2024-08-16 DIAGNOSIS — H25.13 AGE-RELATED NUCLEAR CATARACT OF BOTH EYES: ICD-10-CM

## 2024-08-16 DIAGNOSIS — H35.033 HYPERTENSIVE RETINOPATHY OF BOTH EYES: ICD-10-CM

## 2024-08-16 DIAGNOSIS — M79.7 FIBROMYALGIA: ICD-10-CM

## 2024-08-16 DIAGNOSIS — E11.9 TYPE 2 DIABETES MELLITUS WITHOUT COMPLICATION, WITHOUT LONG-TERM CURRENT USE OF INSULIN (HCC): Primary | ICD-10-CM

## 2024-08-16 DIAGNOSIS — I10 ESSENTIAL HYPERTENSION: ICD-10-CM

## 2024-08-16 DIAGNOSIS — E78.00 HYPERCHOLESTEREMIA: ICD-10-CM

## 2024-08-16 NOTE — PROGRESS NOTES
8/16/2024  Spoke to Rosa for [facility].      Updates to patient care team/comments:  UTD/Reviewed with patient  Patient reported changes in medications: None   Med Adherence  Comment: taking medications as prescribed     Health Maintenance: UTD/Reviewed with patient  Health Maintenance   Topic Date Due    Diabetes Care Foot Exam  Never done    Diabetes Care Dilated Eye Exam  08/15/2023 had an eye exam at [facility]    COVID-19 Vaccine (6 - 2023-24 season) 09/01/2023    Pneumococcal Vaccine: Birth to 64yrs (1 of 2 - PCV) 08/31/2024 (Originally 11/8/1966)    Zoster Vaccines (1 of 2) 08/31/2024 (Originally 11/8/2010)    Influenza Vaccine (1) 10/01/2024    Diabetes Care A1C  01/31/2025    Diabetes Care: GFR  03/16/2025    Mammogram  07/29/2025    Diabetes Care: Microalb/Creat Ratio  07/31/2025    Colorectal Cancer Screening  09/08/2025    Pap Smear  09/22/2025    DTaP,Tdap,and Td Vaccines (2 - Td or Tdap) 04/14/2028    MA Annual Health Assessment  Completed    Annual Depression Screening  Completed       Patient updates/concerns:   The patient was seen by PCP for her yearly medicare physical on 7/31, she was content with the findings on her exam, her A1c level went down from 6.5 to 6.1, and she is not taking any diabetic medications. The patient is controlling her sugar levels by watching what she eats and portion control.    The patient has an appointment to see rheumatologist 10/07 and wanted to know if a Vit d level was supposed to be done prior to her visit.    The patient is running low on vit d capsules and wanted to know if a refill was going to be authorized.    The patient claimed to have eye exam done at [facility] in [place] but is unsure if a diabetic eye exam was performed.        Goals/Action Plan:    Active goal from previous outreach:  to keep her diabetes under control    Patient reported progress towards goals: working towards goal.               - What: meal selection and food portion control            - Where/When/How: be more selective on what food she can eat to prevent sugars to go high  Patient Reported Barriers and Concerns: None                   - Plan for overcoming barriers: N/A    Care Managers Interventions:     [facility] called [facility] at [phone] to obtain records and per Janee a diabetic eye exam was performed October 28, 2023 that didn't show retinopathy, HM was updated and record was still requested to be faxed to PCP's office, fax # [phone].  18 mins with [facility].    Per record, Dr Pisano wants to see patient before ordering labs, and is recommending vit d supplements to be taken daily, 2000 units daily. The patient was notified of specialist recommendations and agrees to obtain vit d OTC once she is done with the prescription she still has.     Ok to take over the counter vit d 2000units  a day - will repeat level at your upcoming visit.   -Dr. WHITAKER    CCM encouraged patient to take the time to enjoy the things she likes to do, which she was at the moment of the call, she was with her grandchildren.     CCM motivates patient to stay active and be as productive as possible to stay healthy and prevent further health complications.    Reconciled the patient's chart using care everywhere.    Updated health maintenance: Recommended patient about getting shingrix and covid vaccine from local pharmacy.     Immunization Query completed: No updates available.    Encouraged patient to call as needed.     Future Appointments:   Future Appointments   Date Time Provider Department Center   10/7/2024 11:20 AM Roderick Pisano MD [facility]   1/27/2025  8:20 AM Carolann Reese MD [id number]         Next Care Manager Follow Up Date: in a month     Reason For Follow Up: review progress and or barriers towards patient's goals.     Time Spent This Encounter Total: 33 min medical record review, telephone communication, care plan updates where needed, education, goals,  and action plan recreation/update. Provided acknowledgment and validation to patient's concerns.   Monthly Minute Total including today: 33  Physical assessment, complete health history, and need for CCM established by Carolann Horton MD.

## 2024-09-16 ENCOUNTER — PATIENT OUTREACH (OUTPATIENT)
Dept: CASE MANAGEMENT | Age: 64
End: 2024-09-16

## 2024-09-16 DIAGNOSIS — M47.816 SPONDYLOSIS OF LUMBAR REGION WITHOUT MYELOPATHY OR RADICULOPATHY: ICD-10-CM

## 2024-09-16 DIAGNOSIS — K21.9 GASTROESOPHAGEAL REFLUX DISEASE WITHOUT ESOPHAGITIS: ICD-10-CM

## 2024-09-16 DIAGNOSIS — F32.4 MAJOR DEPRESSIVE DISORDER WITH SINGLE EPISODE, IN PARTIAL REMISSION (HCC): ICD-10-CM

## 2024-09-16 DIAGNOSIS — E11.9 TYPE 2 DIABETES MELLITUS WITHOUT COMPLICATION, WITHOUT LONG-TERM CURRENT USE OF INSULIN (HCC): Primary | ICD-10-CM

## 2024-09-16 DIAGNOSIS — M15.0 PRIMARY OSTEOARTHRITIS INVOLVING MULTIPLE JOINTS: ICD-10-CM

## 2024-09-16 DIAGNOSIS — Z79.82 LONG TERM CURRENT USE OF ASPIRIN: ICD-10-CM

## 2024-09-16 DIAGNOSIS — M79.7 FIBROMYALGIA: ICD-10-CM

## 2024-09-16 DIAGNOSIS — E78.00 HYPERCHOLESTEREMIA: ICD-10-CM

## 2024-09-16 DIAGNOSIS — I10 ESSENTIAL HYPERTENSION: ICD-10-CM

## 2024-09-16 DIAGNOSIS — Z98.890 S/P MEDIAL MENISCUS REPAIR OF LEFT KNEE: ICD-10-CM

## 2024-09-19 NOTE — PROGRESS NOTES
9/19/2024  Spoke to Rosa for CCM.      Updates to patient care team/comments:  UTD/Reviewed with patient  Patient reported changes in medications: Stopped Vit D as of 09/14  Med Adherence  Comment: taking as directed.     Health Maintenance: UTD/Reviewed with patient  Health Maintenance   Topic Date Due    Diabetes Care Foot Exam  Never done    Pneumococcal Vaccine: Birth to 64yrs (1 of 2 - PCV) Never done    Zoster Vaccines (1 of 2) Never done    COVID-19 Vaccine (6 - 2023-24 season) 09/01/2024    Diabetes Care Dilated Eye Exam  10/28/2024    Influenza Vaccine (1) 10/01/2024    Diabetes Care A1C  01/31/2025    Diabetes Care: GFR  03/16/2025    Mammogram  07/29/2025    Diabetes Care: Microalb/Creat Ratio  07/31/2025    Colorectal Cancer Screening  09/08/2025    Pap Smear  09/22/2025    DTaP,Tdap,and Td Vaccines (2 - Td or Tdap) 04/14/2028    MA Annual Health Assessment  Completed    Annual Depression Screening  Completed       Patient updates/concerns:   The patient had receive a message from her mail order pharmacy but was unsure of the reason why she got the text and asked CCM to contact the pharmacy on her behalf.     The patient has no more Vit D and her last dose was 09/14, she does have an appointment coming up to go see rheumatologist next month and she will find out if she needed to continue it or not depending on her lab results.    Goals/Action Plan:    Active goal from previous outreach:  stay healthy, continue with good diabetes control.    Patient reported progress towards goals: ongoing and working towards goal.               - What: good diabetes control           - Where/When/How: eating healthier and portion control  Patient Reported Barriers and Concerns: None                   - Plan for overcoming barriers: N/A    Care Managers Interventions:   CCM called mail order pharmacy to figure out why the patient received a text message from them and per Socorro they just wanted to know if she needed a  refill or not. The patient does have refills on file and CCM authorize them to have patient get a refill mail to her address. Refill was processed by pharmacist and there was no charge, she will receive the medication in 3-5 business days.  3mins with pharmacy.    CCM reminded the patient she was almost due for an eye exam and she refuses to see one our provider and will go see an independent doctor on her own, she will ask them to perform a diabetic eye exam and I asked her to have them fax over such note to PCP's office and agreed to do as recommended.    CCM encouraged patient to take the time to enjoy the things she likes to do.    CCM motivates patient to stay active and be as productive as possible to stay healthy and prevent further health complications.    Reconciled the patient's chart using care everywhere.    Updated health maintenance: Recommended patient about getting pneumonia, shingrix and covid vaccine from local pharmacy.     Immunization Query completed: No updates available.    Encouraged patient to call as needed.       Future Appointments:   Future Appointments   Date Time Provider Department Center   10/7/2024 11:20 AM Roderikc Pisano MD [facility]   1/27/2025  8:20 AM Carolann Reees MD [id number]         Next Care Manager Follow Up Date: in a month or sooner if needed.    Reason For Follow Up: review progress and or barriers towards patient's goals.     Time Spent This Encounter Total: 20 min medical record review, telephone communication, care plan updates where needed, education, goals, and action plan recreation/update. Provided acknowledgment and validation to patient's concerns.   Monthly Minute Total including today: 25  Physical assessment, complete health history, and need for CCM established by Carolann Horton MD.

## 2024-09-19 NOTE — PROGRESS NOTES
The patient was calling [facility] to ask about a Rx she received a message from her mail order pharmacy she doesn't believe she uses any Rx's with the letter of FOS.  By reviewing her record I explained to her that the message that stated FOS is the same as Alendronate, which she uses, question was clarified and she verbalized understanding.    3mins with patient.  5 min total

## 2024-10-07 ENCOUNTER — OFFICE VISIT (OUTPATIENT)
Dept: RHEUMATOLOGY | Facility: CLINIC | Age: 64
End: 2024-10-07

## 2024-10-07 VITALS
BODY MASS INDEX: 38.38 KG/M2 | WEIGHT: 200.69 LBS | SYSTOLIC BLOOD PRESSURE: 130 MMHG | RESPIRATION RATE: 16 BRPM | HEART RATE: 59 BPM | HEIGHT: 60.63 IN | DIASTOLIC BLOOD PRESSURE: 66 MMHG

## 2024-10-07 DIAGNOSIS — M17.12 PRIMARY OSTEOARTHRITIS OF LEFT KNEE: ICD-10-CM

## 2024-10-07 DIAGNOSIS — M79.7 FIBROMYALGIA: Primary | ICD-10-CM

## 2024-10-07 DIAGNOSIS — M17.11 PRIMARY OSTEOARTHRITIS OF RIGHT KNEE: ICD-10-CM

## 2024-10-07 PROCEDURE — 99214 OFFICE O/P EST MOD 30 MIN: CPT | Performed by: INTERNAL MEDICINE

## 2024-10-07 PROCEDURE — G2211 COMPLEX E/M VISIT ADD ON: HCPCS | Performed by: INTERNAL MEDICINE

## 2024-10-07 PROCEDURE — 3075F SYST BP GE 130 - 139MM HG: CPT | Performed by: INTERNAL MEDICINE

## 2024-10-07 PROCEDURE — 3008F BODY MASS INDEX DOCD: CPT | Performed by: INTERNAL MEDICINE

## 2024-10-07 PROCEDURE — 3078F DIAST BP <80 MM HG: CPT | Performed by: INTERNAL MEDICINE

## 2024-10-07 NOTE — PROGRESS NOTES
Rosa Guillory is a 63 year old female who presents for   Chief Complaint   Patient presents with    Fibromyalgia    Medication Follow-Up    Back Pain   .   HPI:     I had the pleasure of seeing Rosa Guillory on 7/6/2017 for evaluation.     She is a pleasant 56 year old who has hx of fibromyalgia.   She has seen dr. Garcia in the past - last in 6/6/2017.   She has had this for 3 years. She feels the pain startted at this time. She had testing for RA and SLE in 2015 that were all negative.   She's on gabapentin 400mg at night right now. This helps partially. She cna't increase this b/c it makes her too dizzy.   seh has tried other medications - caused dizziness.   lyrica didn't help - nausea,   Doxepin didn't help -   She is on paroxetine 10mg a day for anxiety - she's been on this for a long time - more than 3 years.   meds to help her sleep didn't help.     She tried aleve and advil - helps partially. She can't remember if those cause dizziness.   Area of right side, is having numbness.     She is doing phsyical therapy - for her knees.   Walking causes CABELLO, and her left arm pain -   She does the house work but not her other normal activities.   She has no energy and fatigue.     8/3/2017  She feels 5/10 pain. She is so so. She stopped meloxicam  - b/c of nausea.   She tried amitriptyline 10mg at night but felt it was the same as what dr. Dumont then went back to paroxetine.   shes' on gabpaentin 400mg at night.   She still has pain in her knees - physical thearpy has helped for this a lot. She still has a little bit of knee pain but it's overall much better.     10/9/2017  She felt the duloxetine caused hot flashes and dizziness.   She hasn't felt It helped. She felt tremors all over her body.   She stopped it.   She has 6/10 muscle and joint pain.   She felt that two gabpentin wasn't helping much so she is taking one tablet a tnight and it's helping now.       4/9/2018  Needed  -   She has  5/10 pain.   She is feeling ok with the gabpentin 300mg at night. She tapered from 400mg b/c it made her too dizzy.   She has no drowsiness or dizziness.     10/8/2018  Her pain is 5/10 pain. She is doing well on gabpnetin 300mg at night.   She feels 9/10 pain in her left thumb. She's had this pain for 1 month. No injury.     She had a left knee arthrscopic sx . She didn't feel better. She did get a injeciton in her left knee. She was told it wasn't better b/c of back pain.   She is also seeing dr. Perdomo - she's getting MRI lumbar spine for numbnessin her left leg.     4/16/2019  She has a little pain in her shoulders. She feels good since her last visit. In the last 2 weeks, she's had pain in her shoulders.she has 8/10 pain.   She is still having knee pain.   She has tried increasing gabapnetin but it makes her dizzy.   seh's using heating pad in the shoulders .   Given synvisc injection on 3/29/2019 - with dr. waldrop    2/4/2020  Right now, she is having 7/10 pain.   She has no new pain. It was less before and it's more.   IPhysical therapy.  helped her. She's continued the therapy exercises at home.   She did see Dr. Waldrop in 6/2019 - after injection didn't help. He advised that no guarantee that surgery would eliminate michelet so she did another course of PT for her knees.   That helped helped the knee pain.     11/10/2020  Used   She has been ok. When it gets cold, she has more joint pain.   She has had about 6-7/10 pain.   She is on naproxen 500mg once a day- it can bother her stomach.   She on gabapentin 300mg at night. She has no dizziness.   She is doing stretching exercises at home.   She has ongoing left knee pain -    7/6/2021  She has a pain on her right side of her shoudler.   She felt it started hurting around 3 months ago.   She ha pain down her right shoudler with any   She didn't hurt herself.   She has no pain donw her right arm with moving her right shoulde.r   She has about  9/10 pain.   She's using the diclofenac gle ointmsne.     Falling with her knee pain.     9/26/2022  She feels her right shoudler is better. She went to therapy.   Her pain si 5/10 pain.   She feels her right knee is hurting . This is 8/10 pain.   She has pain in all the time.   Even sitting and driving iit hurts.   Not hurting after resting it.   No swelling.   Not taking tyelnol or ibuporfne for her knees.     6/20/2023s  She fell down  has pain in her left knee about 1 month ago. She still has a bruise.   She cant walk.   She never did get injeciton for right knee but did severs months of PT for her rgith knee.   She did get narpoxen on 6/16/2023 - and she feel this helps her pain.   She is better with the pain. But she had sx on her left knee and is worreid she reinjured it.     10/7/2024  Her left knee hurts and both knee can feel like they give out a ltitle bit.   She feels it's more her left one right now.   She has done PT a lot and had left kene sx in the past.   She is using a cane to walk.   Her knees are not hurting - sitting down but when she walks she gets a sharp pain inher left knee suddenly.   She's doing well with the gabapentin - but she takes  300mg as needed - it helps with the pain - so she doesn't take it daily. - it's too strong for her -     She has been having lower back pain. She feels it started when taking the alendroante. The lower back pain     Wt Readings from Last 2 Encounters:   10/07/24 200 lb 11.2 oz (91 kg)   07/31/24 195 lb (88.5 kg)     Body mass index is 38.39 kg/m².      Current Outpatient Medications   Medication Sig Dispense Refill    pantoprazole 40 MG Oral Tab EC Take 1 tablet (40 mg total) by mouth before breakfast. 90 tablet 0    metoprolol succinate ER 25 MG Oral Tablet 24 Hr Take 0.5 tablets (12.5 mg total) by mouth daily. 45 tablet 1    gabapentin 300 MG Oral Cap Take 1 capsule (300 mg total) by mouth nightly. 90 capsule 3    pravastatin 20 MG Oral Tab Take 1 tablet  (20 mg total) by mouth nightly. 90 tablet 3    PARoxetine 10 MG Oral Tab Take 1 tablet (10 mg total) by mouth every morning. 90 tablet 3    alendronate 70 MG Oral Tab Take 1 tablet (70 mg total) by mouth every 7 days. 12 tablet 3    aspirin (ASPIRIN LOW DOSE) 81 MG Oral Tab EC Take 1 tablet (81 mg total) by mouth daily. 90 tablet 2    ERGOCALCIFEROL 1.25 MG (76348 UT) Oral Cap TAKE 1 CAPSULE BY MOUTH ONE TIME PER WEEK (Patient not taking: Reported on 9/19/2024) 12 capsule 0      Past Medical History:    Anxiety state, unspecified    Calcified granuloma of lung    Carpal tunnel syndrome on both sides    Cataract    Essential hypertension    Fibromyalgia    Hyperlipidemia    Meibomian gland dysfunction    Schirmer 28mm/26mm    Obesity    Osteoarthritis    Sleep apnea    Visual impairment      Past Surgical History:   Procedure Laterality Date    Carpal tunnel release Right 2006    Carpal tunnel release Left 10/14/2014    Knee arthroscopy Left 01/24/2018    partial medial and lateral meniscectomies      Family History   Problem Relation Age of Onset    Hypertension Father     Heart Disease Father     Hypertension Mother     Arrhythmia Mother     Cancer Sister         cervical CA    Hypertension Sister     Hypertension Sister     Cancer Sister     Polyps Sister     Other (Other) Maternal Grandfather     Other (Other) Paternal Grandmother     Other (Other) Paternal Grandfather     No Known Problems Maternal Grandmother     No Known Problems Daughter     No Known Problems Son     No Known Problems Brother     No Known Problems Brother     No Known Problems Brother     No Known Problems Sister     No Known Problems Self     No Known Problems Maternal Aunt     No Known Problems Paternal Aunt     No Known Problems Maternal Cousin Female     No Known Problems Maternal Cousin Male     No Known Problems Paternal Cousin Female     No Known Problems Paternal Cousin Male     No Known Problems Other     Breast Cancer Neg      Ovarian Cancer Neg     DCIS Neg     LCIS Neg     BRCA gene + Neg     Ashkenazi Religion Descent Neg       Social History:  Social History     Socioeconomic History    Marital status:    Tobacco Use    Smoking status: Never    Smokeless tobacco: Never   Vaping Use    Vaping status: Never Used   Substance and Sexual Activity    Alcohol use: No    Drug use: No    Sexual activity: Never   Other Topics Concern    Currently spends a great deal of time in the sun No    Past Sunlamp Treatments for Acne No    History of tanning No    Hx of Spending Great Deal of Time in Sun No    Bad sunburns in the past No    Tanning Salons in the Past No    Hx of Radiation Treatments No    Regular use of sun block No    Caffeine Concern No    Exercise Yes    Bike Helmet Yes     Comment: HEP    Reaction to local anesthetic No   Social History Narrative    The patient uses the following assistive device(s):  quad cane.      The patient does live in a home with stairs.      Lives with daughter        REVIEW OF SYSTEMS:   Review Of Systems:  Fatigue  Constitutional:No fever, no change in weight or appetitie  Derm: No rashes, no oral ulcers, no alopecia, no photosensitivity, no psoriasis  HEENT: No dry eyes, no dry mouth, no Raynaud's, no nasal ulcers, no parotid swelling, no neck pain, no jaw pain, no temple pain  Eyes: No visual changes,   CVS: No chest pain, no heart disease, can have pain with her left hand with walking - she had heart testing with dr. Dumont -   RS: has SOB, some Cough, No Pleurtic pain,   GI: No nausea, no vomiiting, no abominal pain, no hx of ulcer, heartburn, no dyshpagia, no BRBPR or melena  : no dysuria, no hx of miscarriages, no DVT Hx, no hx of OCP,   Neuro:the finger tips have - and tinlging in her arms prestsn - she has  numbness or tingling, no headache, no hx of seizures,   Psych: no hx of anxiety or depression  ENDO: no hx of thyroid disease, no hx of DM  She has no trouble sleeping now but sheis  napping now.,   Joint/Muscluskeltal: see HPI,   All other ROS are negative.     EXAM:   /66   Pulse 59   Resp 16   Ht 5' 0.63\" (1.54 m)   Wt 200 lb 11.2 oz (91 kg)   BMI 38.39 kg/m²   HEENT: Clear oropharynx, no oral ulcers, EOM intact, clear sclear, PERRLA, pleasant, no acute distress, no CAD, no neck tendnerness, good ROM,   No rashes  CVS: RRR, no murmurs  RS: CTAB, no crackles, no rhonchi  ABD: Soft Non tender, no HSM felt, BS positive  Joint exam:   Right shoulder mild tender - can now  abduct past 90 degrees to about 120-160 degres - tender in ac joint   14/18 tender points   No synvoits noted    Tender in 1-5th mcps and pisps b/l in hands.   Right knee tender , no swelling, crepitus felt.   Left knee not tender   No trigger finger.   No edema    Component      Latest Ref Rng & Units 9/1/2015 2/24/2015   TOTAL PROTEIN      6.5 - 9.1 g/dL 7.6 7.3   Albumin      3.8 - 5.8 g/dL 4.29 4.15   ALPHA-1-GLOBULINS      0.1 - 0.3 g/dL 0.16 0.16   ALPHA-2-GLOBULINS      0.6 - 1.0 g/dL 0.73 0.70   BETA GLOBULINS      0.7 - 1.3 g/dL 0.95 0.84   GAMMA GLOBULINS      0.5 - 1.7 g/dL 1.47 1.45   ALBUMIN/GLOBULIN RATIO      1.0 - 2.0 1.30 1.31   SPE INTERPRETATION       See Comment See Comment   Anti-Sjogren's A      Negative  Negative   Anti-Sjogren's B      Negative  Negative   C-REACTIVE PROTEIN      0.0 - 0.9 mg/dL 0.7 <0.5   SED RATE      0 - 30 MM/HR 11 13   EKTA SCREEN      Negative  Negative   COMPLEMENT C3      88 - 201 mg/dL  134   COMPLEMENT C4      18 - 55 mg/dL  27   Anti-Javed Antibody      Negative  Negative   Anti-Javed/RNP Antibody      Negative  Negative   Anti Double Strand DNA      <10  <10   RHEUMATOID FACTOR      <11 IU/mL <5.0 6   C-Citrullinated Peptide IgG AB      0.0 - 6.9 U/ML 1.0 1.6       Component      Latest Ref Rn 3/16/2024   WBC      4.0 - 11.0 x10(3) uL 5.7    RBC      3.80 - 5.30 x10(6)uL 4.61    Hemoglobin      12.0 - 16.0 g/dL 13.0    Hematocrit      35.0 - 48.0 % 41.1    MCV       80.0 - 100.0 fL 89.2    MCH      26.0 - 34.0 pg 28.2    MCHC      31.0 - 37.0 g/dL 31.6    RDW-SD      35.1 - 46.3 fL 44.4    RDW      11.0 - 15.0 % 13.6    Platelet Count      150.0 - 450.0 10(3)uL 284.0    Prelim Neutrophil Abs      1.50 - 7.70 x10 (3) uL 2.73    Neutrophils Absolute      1.50 - 7.70 x10(3) uL 2.73    Lymphocytes Absolute      1.00 - 4.00 x10(3) uL 2.13    Monocytes Absolute      0.10 - 1.00 x10(3) uL 0.54    Eosinophils Absolute      0.00 - 0.70 x10(3) uL 0.24    Basophils Absolute      0.00 - 0.20 x10(3) uL 0.03    Immature Granulocyte Absolute      0.00 - 1.00 x10(3) uL 0.01    Neutrophils %      % 48.1    Lymphocytes %      % 37.5    Monocytes %      % 9.5    Eosinophils %      % 4.2    Basophils %      % 0.5    Immature Granulocyte %      % 0.2    Glucose      70 - 99 mg/dL 105 (H)    Sodium      136 - 145 mmol/L 139    Potassium      3.5 - 5.1 mmol/L 4.5    Chloride      98 - 112 mmol/L 108    Carbon Dioxide, Total      21.0 - 32.0 mmol/L 27.0    ANION GAP      0 - 18 mmol/L 4    BUN      9 - 23 mg/dL 17    CREATININE      0.55 - 1.02 mg/dL 0.64    BUN/CREATININE RATIO      10.0 - 20.0  26.6 (H)    CALCIUM      8.7 - 10.4 mg/dL 9.1    CALCULATED OSMOLALITY      275 - 295 mOsm/kg 290    EGFR      >=60 mL/min/1.73m2 99    ALT (SGPT)      10 - 49 U/L 22    AST (SGOT)      <=34 U/L 23    ALKALINE PHOSPHATASE      50 - 130 U/L 110    Total Bilirubin      0.2 - 1.1 mg/dL 0.3    PROTEIN, TOTAL      5.7 - 8.2 g/dL 7.6    Albumin      3.2 - 4.8 g/dL 4.1    Globulin      2.8 - 4.4 g/dL 3.5    A/G Ratio      1.0 - 2.0  1.2    Patient Fasting for CMP? Yes    Color Urine      Yellow  Light-Yellow    Clarity Urine      Clear  Clear    Spec Gravity      1.005 - 1.030  1.026    Glucose Urine      Normal mg/dL Normal    Bilirubin Urine      Negative  Negative    Ketones, UA      Negative mg/dL Negative    Blood Urine      Negative  Negative    PH Urine      5.0 - 8.0  6.5    Protein Urine      Negative mg/dL  Negative    Urobilinogen Urine      Normal  Normal    Nitrite Urine      Negative  Negative    Leukocyte Esterase       Negative  250 !    WBC Urine      0 - 5 /HPF 11-20 !    RBC Urine      0 - 2 /HPF 0-2    Bacteria Urine      None Seen /HPF Rare !    SQUAM EPI CELLS UR      None Seen /HPF Few !    RENAL TUBULAR EPITHELIAL CELLS      None Seen /HPF None Seen    TRANSITIONAL EPI CELLS      None Seen /HPF Few !    YEAST URINE      None Seen /HPF None Seen    Cholesterol, Total      <200 mg/dL 147    HDL Cholesterol      40 - 59 mg/dL 47    Triglycerides      30 - 149 mg/dL 89    LDL Cholesterol Calc      <100 mg/dL 83    VLDL      0 - 30 mg/dL 14    NON-HDL CHOLESTEROL      <130 mg/dL 100    Patient Fasting for Lipid? Yes    HEMOGLOBIN A1c      <5.7 % 6.5 (H)    ESTIMATED AVERAGE GLUCOSE      68 - 126 mg/dL 140 (H)    VITAMIN D, 25-OH, TOTAL      30.0 - 100.0 ng/mL 27.9 (L)       Legend:  (H) High  ! Abnormal  (L) Low  9/21/2017 - mri left knee   CONCLUSION: Complex degenerative tear within the posterior horn and body of medial meniscus.     Stress changes within the medial compartment without fracture or dislocation.     Tricompartmental chondral fissuring.     Anterior prepatellar soft tissue swelling is nonspecific but can be seen with bursitis.    6/6/2017 - bilat knee xray - normal    7/6/2021 - right shoulder xray     CONCLUSION: No acute osseous abnormality of the right shoulder.      7/17/2019 - right knee xray   1. Minimal degenerative changes medial compartment.   2. 12 mm osteochondral lesion lateral femoral condyle.    10/3/2022 - right knee xray   1. No acute fracture dislocation.     2. Mild femoral tibial degeneration unchanged.     10/29/2022 - chest xray   1. No acute cardiopulmonary disease       3/19/2024 - DEXA  LEFT FEMORAL NECK   BMD: 0.575 gm/sq. cm. T SCORE: -2.5 Z SCORE: -1.2      LEFT TOTAL HIP   BMD: 0.911 gm/sq. cm. T SCORE: -0.3 Z SCORE: 0.7      PA LUMBAR SPINE (L1 - L4)   BMD: 0.673  gm/sq. cm. T SCORE: -3.4 Z SCORE: -1.8     ASSESSMENT AND PLAN:   Rosa Guillory is a 63 year old female who presents for   Chief Complaint   Patient presents with    Fibromyalgia    Medication Follow-Up    Back Pain         1. fibromyalgia -   Used    - stable - 5/10 pain   On gabapentin 300mg at night.  - this is helping - takes it as needed.   Cont. Home exercises for stretching and knee strengtehnig.   - rtc in 6 -12 months     In the past:  Gabapentin 400mg made her dizzy -   Duloxetine 20mg  caused dizziness, and tremore for her   - higher doses of gabapentin didn't help her   - lyrica 100mg bid - dizziness and didnt'h elp    - doxepins at bedtime - caused dizziness.   - tried cyclobenzaprine in tihe past as well - she can't remember -she declines anymore muscle relaxant at night.   - d/w her to stop paroxtine again   - she felt amitriptyline is not better than paroxtein - so she switched after 1 week   - she also has n/v with meloxicam.     2. Left knee pain nd right knee pain - acute - fell on side walk  Uneven -in 2023 -   - better on naproxen 500mg twice a day as neede d  - check left knee xray - likely osteoarthritis   - she will let me knwo if she wants PT - declines at this time.   0-will refill diclofenac gel 1 % topical  Hx of left knee surgery -     3.,. right Knee pain -more than left  -  mild  oa on xrays of right knee   More right knee pain now   Doing better now - did PT   She never needed the hylalurnic acid injectiosn in 11/2022 - and cancelled the appointment -   Asked her to take tyelnol or ibuprofen   Diclofenac gel 1 % topical - up to 4 times a day     In the past.   For   mild left knee pain - complex tear on MRI -   Had surgery on her left foot -  S/p left knee surgery arthroscopei with dr. clemente - 1/20218 -   - meloxicam 15mg adya - cuased n/v   She stopped PT so far and f/u ortho - dr. clemente - but in 8/2017 - this helped her a lot - she stopped it though.   Given  synvisc injection on 3/29/2019 for left knee.  - with dr. clemente  - volterne gel is hleping.     4. Right frozen shoulder - better with PT   She declines injecitoins       5. GERD on PPI - d/w her to take narpoxen as needed -w atch for gastirits.     6.Left thumb pain - use topical gel 1 % , , not trigger finger yet -    7. Osteoporosis - severe - DEXA in 3/2024 - shows T score  lumbar spine -3.4 - started on alendroante     8. Lower back pain - she feels is from alendronate - ok to stop for 1 month and see but if not related - then should start it agian      Summary:  1. Cont. Gabapentin 300mg at night   2. Diclofenac 1 % gel - or VOLTAREN GEL 1% for right knee - upt four times a day   3. Check left knee xray - and right knee xray   4. Consider tyelnol 500-650mg 1-2 tablets a day -   5. Info on gel injections for knee osteoarthriits.   6. Return to clinic in 12 months.   7. Info on osteoporosis -   8. Lower back pain - can stop alendronate for 1 month and see if it's related but if it's not -need to restart         Roderick Pisano MD  10/7/2024   11:51 AM     is applicable because the patient's medical record notes reflects the ongoing nature of the continuous longitudinal relationship of care, and the medical record indicates that there is ongoing treatment of a serious/complex medical condition.

## 2024-10-07 NOTE — PATIENT INSTRUCTIONS
1. Cont. Gabapentin 300mg at night   2. Diclofenac 1 % gel - or VOLTAREN GEL 1% for right knee - upt four times a day   3. Check left knee xray - and right knee xray   4. Consider tyelnol 500-650mg 1-2 tablets a day -   5. Info on gel injections for knee osteoarthriits.   6. Return to clinic in 12 months.   7. Info on osteoporosis -   8. Lower back pain - can stop alendronate for 1 month and see if it's related but if it's not -need to restart

## 2024-10-08 DIAGNOSIS — I10 ESSENTIAL HYPERTENSION: ICD-10-CM

## 2024-10-10 RX ORDER — METOPROLOL SUCCINATE 25 MG/1
12.5 TABLET, EXTENDED RELEASE ORAL DAILY
Qty: 45 TABLET | Refills: 1 | Status: SHIPPED | OUTPATIENT
Start: 2024-10-10

## 2024-10-12 ENCOUNTER — HOSPITAL ENCOUNTER (OUTPATIENT)
Dept: GENERAL RADIOLOGY | Facility: HOSPITAL | Age: 64
Discharge: HOME | End: 2024-10-12
Attending: INTERNAL MEDICINE
Payer: MEDICARE

## 2024-10-12 DIAGNOSIS — M17.11 PRIMARY OSTEOARTHRITIS OF RIGHT KNEE: ICD-10-CM

## 2024-10-12 DIAGNOSIS — M17.12 PRIMARY OSTEOARTHRITIS OF LEFT KNEE: ICD-10-CM

## 2024-10-12 PROCEDURE — 73560 X-RAY EXAM OF KNEE 1 OR 2: CPT | Performed by: INTERNAL MEDICINE

## 2024-10-25 ENCOUNTER — PATIENT OUTREACH (OUTPATIENT)
Dept: CASE MANAGEMENT | Age: 64
End: 2024-10-25

## 2024-10-25 DIAGNOSIS — F32.4 MAJOR DEPRESSIVE DISORDER WITH SINGLE EPISODE, IN PARTIAL REMISSION (HCC): ICD-10-CM

## 2024-10-25 DIAGNOSIS — M79.7 FIBROMYALGIA: ICD-10-CM

## 2024-10-25 DIAGNOSIS — M81.0 AGE-RELATED OSTEOPOROSIS WITHOUT CURRENT PATHOLOGICAL FRACTURE: ICD-10-CM

## 2024-10-25 DIAGNOSIS — I10 ESSENTIAL HYPERTENSION: ICD-10-CM

## 2024-10-25 DIAGNOSIS — E11.9 TYPE 2 DIABETES MELLITUS WITHOUT COMPLICATION, WITHOUT LONG-TERM CURRENT USE OF INSULIN (HCC): Primary | ICD-10-CM

## 2024-10-25 DIAGNOSIS — E78.00 HYPERCHOLESTEREMIA: ICD-10-CM

## 2024-10-25 NOTE — PROGRESS NOTES
10/25/2024  Spoke to Rosa for CCM.      Updates to patient care team/comments:  UTD/Reviewed with patient  Patient reported changes in medications: No longer taking Vitamin D 67708 units weekly,therapy was completed.  Med Adherence  Comment: taking as prescribed     Health Maintenance: UTD/Reviewed with patient  Health Maintenance   Topic Date Due    Diabetes Care Foot Exam  Never done    Pneumococcal Vaccine: Birth to 64yrs (1 of 2 - PCV) Never done refuses to get    Zoster Vaccines (1 of 2) Never done refuses to get    COVID-19 Vaccine (6 - 2023-24 season) 09/01/2024    Influenza Vaccine (1) Never done refuses to get    Diabetes Care Dilated Eye Exam  10/28/2024 CCM to obtain recent note, was just seen this month    Diabetes Care A1C  01/31/2025    Diabetes Care: GFR  03/16/2025    Mammogram  07/29/2025    Diabetes Care: Microalb/Creat Ratio  07/31/2025    Colorectal Cancer Screening  09/08/2025    Pap Smear  09/22/2025    DTaP,Tdap,and Td Vaccines (2 - Td or Tdap) 04/14/2028    MA Annual Health Assessment  Completed    Annual Depression Screening  Completed       Patient updates/concerns:     The patient was seen by rheumatologist, Dr Pisano on 10/07/24, she has seen her for many years regarding her fibromyalgia, she was seen for her yearly check up with the specialist and was instructed to get xray done on her knees, her results were read by the doctor and she sent the patient  message informing her she has mild osteoarthritis of her right knee.    Summary:  1. Cont. Gabapentin 300mg at night   2. Diclofenac 1 % gel - or VOLTAREN GEL 1% for right knee - upt four times a day   3. Check left knee xray - and right knee xray   4. Consider tyelnol 500-650mg 1-2 tablets a day -   5. Info on gel injections for knee osteoarthriits.   6. Return to clinic in 12 months.   7. Info on osteoporosis -   8. Lower back pain - can stop alendronate for 1 month and see if it's related but if it's not -need to restart        Roderick Pisano MD    The patient does have pain on a daily basis, she rates her pain as mild but constant and feels like she has learned to deal with it, [facility] offered her to request physical therapy on her knee but she declined and stated she has had enough therapy with not much relief and attributes her pain to her fibromyalgia.    The patient was seen for her yearly eye exam, [facility] to obtain those records, she was found to be stable on her vision during such consult.     The patient is currently taking vitamin d 1000 units daily, she was wondering if a Rx for the 16209 units was possible to get or if there was no more need to take this strength. Rosa did have a vitamin d level checked in March but not recently.    Goals/Action Plan:    Active goal from previous outreach:  the patient would like to be able to feel less pain    Patient reported progress towards goals: ongoing and working towards goal               - What: be able to feel less pain           - Where/When/How: to take medication as prescribed  Patient Reported Barriers and Concerns: none                   - Plan for overcoming barriers: N/A    Care Managers Interventions:     [facility] called ophthalmologist office to obtain her most recent eye exam, per Vivien she was seen 10/19 and did have a diabetic eye exam, such note will be faxed to pcp's office.  4 mins with Vivien.    [facility] recommended the patient to ask PCP during her upcoming appointment to order her a vitamin d level if appropriate, she agreed to do as instructed.    [facility] ecouraged the patient to take the time to do the things she likes and enjoys, cotinue a balanced diet and good nutrition to stay healthy an prevent further complications.    CCM motivated the patient to stay active and maintain a balance physical activity, to exercise as tolerated.          Future Appointments:   Future Appointments   Date Time Provider Department Center   1/27/2025  8:20 AM Carolann Reese MD [id number]  [facility]   10/6/2025 10:00 AM Roderick Pisano MD [id number]         Next Care Manager Follow Up Date: in a month or sooner if needed.    Reason For Follow Up: review progress and or barriers towards patient's goals.     Time Spent This Encounter Total: 21 min medical record review, telephone communication, care plan updates where needed, education, goals, and action plan recreation/update. Provided acknowledgment and validation to patient's concerns.   Monthly Minute Total including today: 21  Physical assessment, complete health history, and need for CCM established by Carolann Horton MD.

## 2024-11-13 DIAGNOSIS — K21.9 GASTROESOPHAGEAL REFLUX DISEASE WITHOUT ESOPHAGITIS: ICD-10-CM

## 2024-11-13 DIAGNOSIS — K44.9 HIATAL HERNIA: ICD-10-CM

## 2024-11-13 RX ORDER — PANTOPRAZOLE SODIUM 40 MG/1
40 TABLET, DELAYED RELEASE ORAL
Qty: 90 TABLET | Refills: 1 | Status: SHIPPED | OUTPATIENT
Start: 2024-11-13 | End: 2025-02-17

## 2024-11-16 DIAGNOSIS — Z79.82 LONG TERM CURRENT USE OF ASPIRIN: ICD-10-CM

## 2024-11-18 RX ORDER — ASPIRIN 81 MG/1
81 TABLET ORAL DAILY
Qty: 90 TABLET | Refills: 0 | Status: SHIPPED | OUTPATIENT
Start: 2024-11-18 | End: 2025-02-24

## 2024-12-17 ENCOUNTER — PATIENT OUTREACH (OUTPATIENT)
Dept: CASE MANAGEMENT | Age: 64
End: 2024-12-17

## 2024-12-17 DIAGNOSIS — E78.00 HYPERCHOLESTEREMIA: ICD-10-CM

## 2024-12-17 DIAGNOSIS — I10 ESSENTIAL HYPERTENSION: ICD-10-CM

## 2024-12-17 DIAGNOSIS — M79.7 FIBROMYALGIA: ICD-10-CM

## 2024-12-17 DIAGNOSIS — M15.0 PRIMARY OSTEOARTHRITIS INVOLVING MULTIPLE JOINTS: ICD-10-CM

## 2024-12-17 DIAGNOSIS — M81.0 AGE-RELATED OSTEOPOROSIS WITHOUT CURRENT PATHOLOGICAL FRACTURE: ICD-10-CM

## 2024-12-17 DIAGNOSIS — F32.4 MAJOR DEPRESSIVE DISORDER WITH SINGLE EPISODE, IN PARTIAL REMISSION (HCC): ICD-10-CM

## 2024-12-17 DIAGNOSIS — E11.9 TYPE 2 DIABETES MELLITUS WITHOUT COMPLICATION, WITHOUT LONG-TERM CURRENT USE OF INSULIN (HCC): Primary | ICD-10-CM

## 2024-12-17 NOTE — PROGRESS NOTES
12/17/2024  Spoke to Rosa for CCM.      Updates to patient care team/comments:  UTD/Reviewed with patient  Patient reported changes in medications: None   Med Adherence  Comment: taking as prescribed     Health Maintenance: UTD/Reviewed with patient  Health Maintenance   Topic Date Due    Diabetes Care Foot Exam  Never done    COVID-19 Vaccine (6 - 2024-25 season) 09/01/2024 refuses to get     Diabetes Care Dilated Eye Exam  10/28/2024 done in [facility] 2024    Influenza Vaccine (1) 06/30/2025 (Originally 10/1/2024)    Pneumococcal Vaccine: Birth to 64yrs (1 of 2 - PCV) 11/28/2025 (Originally 11/8/1966)    Zoster Vaccines (1 of 2) 11/28/2025 (Originally 11/8/2010)    Diabetes Care A1C  01/31/2025    Diabetes Care: GFR  03/16/2025    Mammogram  07/29/2025    Diabetes Care: Microalb/Creat Ratio  07/31/2025    Colorectal Cancer Screening  09/08/2025    Pap Smear  09/22/2025    DTaP,Tdap,and Td Vaccines (2 - Td or Tdap) 04/14/2028    MA Annual Health Assessment  Completed    Annual Depression Screening  Completed       Patient updates/concerns:     The patient is reporting to be doing well and feeling good, she has no concerns at the moment, she has been complaint with her medications and is taking them as prescribed,     The patient got a notification from the mail order pharmacy about a prescription she is due to obtain, she was told in the message that they needed her insurance info to process the alendronate prescription, she is asking CCM to contact mail order pharmacy.    The patient is also asking for CCM to contact [facility] about Voltaren gel, she wants to know if a refill is available if they can fill it.    The patient is sleeping well and has no concerns about water retention or any weight gain.    The patient is aware she has an upcoming appointment with PCP next month and that there are no pending order for labs to get done at the moment.    Goals/Action Plan:    Active goal from previous outreach:  stay  positive    Patient reported progress towards goals: ongoing and working towards goal               - What: continue to stay healthy            - Where/When/How: the patient is trying to have a positive mind, trying to stay in a good mood  Patient Reported Barriers and Concerns: none                   - Plan for overcoming barriers: N/A    Care Managers Interventions:     CCM called the patient's [facility] pharmacy and per pharmacy tech the insurance is no longer covering the Voltaren gel and is now OTC with a price of $18.99, they do have refills on file but is not covered any longer by [facility]. The patient was notified, she is unsure if she will get it.    CCM called [facility] and per Cheryl pharmacist, a refill was provided to the patient on 09/19 and is now due for a refill, they can mail it to her today with a zero dollar copay, CCM authorized the medication to be processed.    Advised patient to focus on eating healthier meals, keep doctor's appointment, and increase exercise.     Continued to provide education on how to better manage and cope with chronic conditions. Informed of the importance on reporting any new symptoms to prevent any health complications    CCM encouraged patient to take the time to enjoy the things she likes to do and keep her self around the ones she loves, she lives with her daughter and her family.    CCM motivates patient to stay active and be as productive as possible to stay healthy and prevent further health complications.    Reconciled the patient's chart using care everywhere.    Updated health maintenance: Recommended patient about getting shingrix and covid vaccine from local pharmacy but she refuses to get them,    Immunization Query completed: No updates available.    Encouraged patient to call as needed.     Future Appointments:   Future Appointments   Date Time Provider Department Center   1/27/2025  8:20 AM Carolann Reese MD [id number] 5 [facility]   10/6/2025 10:00 AM  Roderick Pisano MD [facility]       Next Care Manager Follow Up Date: in a month or sooner if needed.    Reason For Follow Up: review progress and or barriers towards patient's goals.     Time Spent This Encounter Total:  29 min medical record review, telephone communication, care plan updates where needed, education, goals, and action plan recreation/update. Provided acknowledgment and validation to patient's concerns.   Monthly Minute Total including today: 29  Physical assessment, complete health history, and need for CCM established by Carolann Horton MD.

## 2025-01-01 DIAGNOSIS — I10 ESSENTIAL HYPERTENSION: ICD-10-CM

## 2025-01-02 RX ORDER — METOPROLOL SUCCINATE 25 MG/1
12.5 TABLET, EXTENDED RELEASE ORAL DAILY
Qty: 50 TABLET | Refills: 2 | OUTPATIENT
Start: 2025-01-02

## 2025-02-13 ENCOUNTER — PATIENT OUTREACH (OUTPATIENT)
Dept: CASE MANAGEMENT | Age: 65
End: 2025-02-13

## 2025-02-13 ENCOUNTER — TELEPHONE (OUTPATIENT)
Dept: INTERNAL MEDICINE CLINIC | Facility: CLINIC | Age: 65
End: 2025-02-13

## 2025-02-13 DIAGNOSIS — M81.0 AGE-RELATED OSTEOPOROSIS WITHOUT CURRENT PATHOLOGICAL FRACTURE: ICD-10-CM

## 2025-02-13 DIAGNOSIS — M47.816 SPONDYLOSIS OF LUMBAR REGION WITHOUT MYELOPATHY OR RADICULOPATHY: ICD-10-CM

## 2025-02-13 DIAGNOSIS — M15.0 PRIMARY OSTEOARTHRITIS INVOLVING MULTIPLE JOINTS: ICD-10-CM

## 2025-02-13 DIAGNOSIS — E78.00 HYPERCHOLESTEREMIA: ICD-10-CM

## 2025-02-13 DIAGNOSIS — F32.4 MAJOR DEPRESSIVE DISORDER WITH SINGLE EPISODE, IN PARTIAL REMISSION: ICD-10-CM

## 2025-02-13 DIAGNOSIS — E11.9 TYPE 2 DIABETES MELLITUS WITHOUT COMPLICATION, WITHOUT LONG-TERM CURRENT USE OF INSULIN (HCC): ICD-10-CM

## 2025-02-13 DIAGNOSIS — M41.80 DEXTROSCOLIOSIS: ICD-10-CM

## 2025-02-13 DIAGNOSIS — M79.7 FIBROMYALGIA: ICD-10-CM

## 2025-02-13 DIAGNOSIS — E11.9 TYPE 2 DIABETES MELLITUS WITHOUT COMPLICATION, WITHOUT LONG-TERM CURRENT USE OF INSULIN (HCC): Primary | ICD-10-CM

## 2025-02-13 DIAGNOSIS — Z00.00 HEALTHCARE MAINTENANCE: Primary | ICD-10-CM

## 2025-02-13 DIAGNOSIS — I10 ESSENTIAL HYPERTENSION: ICD-10-CM

## 2025-02-13 NOTE — PROGRESS NOTES
2/13/2025  Spoke to Rosa for CCM.      Updates to patient care team/comments:  UTD/Reviewed with patient  Patient reported changes in medications: None   Med Adherence  Comment: taking as prescribed      Health Maintenance: UTD/Reviewed with patient  Health Maintenance   Topic Date Due    Pneumococcal Vaccine: 50+ Years (1 of 2 - PCV) Never done    COVID-19 Vaccine (6 - 2024-25 season) 09/01/2024    Diabetes Care Dilated Eye Exam  10/28/2024    Annual Well Visit  01/01/2025    Annual Depression Screening  01/01/2025    Diabetes Care: Foot Exam (Annual)  Never done    Diabetes Care: Microalb/Creat Ratio (Annual)  01/01/2025    Diabetes Care A1C  01/31/2025    Influenza Vaccine (1) 06/30/2025 (Originally 10/1/2024)    Zoster Vaccines (1 of 2) 11/28/2025 (Originally 11/8/2010)    Diabetes Care: GFR  03/16/2025    Mammogram  07/29/2025    Colorectal Cancer Screening  09/08/2025    Pap Smear  09/22/2025    DTaP,Tdap,and Td Vaccines (2 - Td or Tdap) 04/14/2028    Meningococcal B Vaccine  Aged Out       Patient updates/concerns:     The patient is reporting to [facility] to be doing well, she feels fine overall and has no current health concerns.    The patient had an appointment for January but was unable to attend it, she had a personal issue and had to be out of town and had to cancel such appointment, the appointment was rescheduled for the month of June, she was told there was nothing available sooner. [facility] offered to find her a sooner appointment and she agreed to it.    The patient is due to receive some vaccines but is hesitant to get them and prefers to put them on hold for now.    The patient is also due to get labs done, [facility] to send such request to PCP's office to see if they could be done prior to her visit.    The patient is not reporting any unusual weight gain or loss.      Goals/Action Plan:    Active goal from previous outreach:  stay healthy, be able to control her sugars with diet only    Patient reported  progress towards goals: ongoing and working towards her goal.               - What:  stay healthy, be able to control her sugars with diet only           - Where/When/How: watch her diet, small meal portions  Patient Reported Barriers and Concerns: none                   - Plan for overcoming barriers: N/A    Care Managers Interventions:     [facility] was able to help patient schedule an appointment with PCP and is set for April 7 at 1040am, CCM reminded her of new PCP's office location, she was already notified. The appointment she is scheduled for June was kept for her physical to be done that day.    CCM to send TE to PCP's office requesting lab orders to be placed, her last routine labs were done in March of 2024.    [facility] was able to perform depression screening and fall assessment on the patient.    Advised patient to focus on eating healthier meals, keep doctor's appointment, and increase exercise.     Continued to provide education on how to better manage and cope with chronic conditions. Informed of the importance on reporting any new symptoms to prevent any health complications    CCM motivates patient to stay active and be as productive as possible to stay healthy and prevent further health complications.    Reconciled the patient's chart using care everywhere.    Updated health maintenance: Recommended patient about getting pneumonia and covid vaccine from local pharmacy but she is hesitant to get them.    Immunization Query completed: No updates available.    Encouraged patient to call as needed.       Future Appointments:   Future Appointments   Date Time Provider Department Center   6/2/2025 11:20 AM Carolann Reese MD [id number]   10/6/2025 10:00 AM Roderick Pisano MD [facility]     Next Care Manager Follow Up Date: in a month or sooner if needed.    Reason For Follow Up: review progress and or barriers towards patient's goals.     Time Spent This Encounter Total: 25 min medical  record review, telephone communication, care plan updates where needed, education, goals, and action plan recreation/update. Provided acknowledgment and validation to patient's concerns.   Monthly Minute Total including today: 25  Physical assessment, complete health history, and need for CCM established by Carolann Horton MD.

## 2025-02-13 NOTE — TELEPHONE ENCOUNTER
The patient was contacted CCM monthly call, she will be seeing PCP in April and she is due for labs in March.      The patient is wondering if orders for labs can be placed to have them done prior to her visit.    Please advise.

## 2025-02-15 DIAGNOSIS — K44.9 HIATAL HERNIA: ICD-10-CM

## 2025-02-15 DIAGNOSIS — K21.9 GASTROESOPHAGEAL REFLUX DISEASE WITHOUT ESOPHAGITIS: ICD-10-CM

## 2025-02-17 RX ORDER — PANTOPRAZOLE SODIUM 40 MG/1
40 TABLET, DELAYED RELEASE ORAL
Qty: 100 TABLET | Refills: 2 | Status: SHIPPED | OUTPATIENT
Start: 2025-02-17

## 2025-02-17 RX ORDER — ALENDRONATE SODIUM 70 MG/1
70 TABLET ORAL WEEKLY
Qty: 12 TABLET | Refills: 0 | Status: SHIPPED | OUTPATIENT
Start: 2025-02-17 | End: 2025-04-07

## 2025-02-21 DIAGNOSIS — Z79.82 LONG TERM CURRENT USE OF ASPIRIN: ICD-10-CM

## 2025-02-22 ENCOUNTER — HOSPITAL ENCOUNTER (OUTPATIENT)
Age: 65
Discharge: HOME | End: 2025-02-22
Payer: MEDICARE

## 2025-02-22 VITALS
DIASTOLIC BLOOD PRESSURE: 66 MMHG | TEMPERATURE: 98 F | HEART RATE: 63 BPM | RESPIRATION RATE: 18 BRPM | OXYGEN SATURATION: 98 % | SYSTOLIC BLOOD PRESSURE: 167 MMHG

## 2025-02-22 DIAGNOSIS — J02.9 VIRAL PHARYNGITIS: Primary | ICD-10-CM

## 2025-02-22 LAB
S PYO AG THROAT QL: NEGATIVE
SARS-COV-2 RNA RESP QL NAA+PROBE: NOT DETECTED

## 2025-02-22 PROCEDURE — 99213 OFFICE O/P EST LOW 20 MIN: CPT | Performed by: PHYSICIAN ASSISTANT

## 2025-02-22 PROCEDURE — 87880 STREP A ASSAY W/OPTIC: CPT | Performed by: PHYSICIAN ASSISTANT

## 2025-02-22 PROCEDURE — U0002 COVID-19 LAB TEST NON-CDC: HCPCS | Performed by: PHYSICIAN ASSISTANT

## 2025-02-22 RX ORDER — FLUTICASONE PROPIONATE 50 MCG
1 SPRAY, SUSPENSION (ML) NASAL 2 TIMES DAILY
Qty: 16 G | Refills: 0 | Status: SHIPPED | OUTPATIENT
Start: 2025-02-22 | End: 2025-03-04

## 2025-02-22 RX ORDER — BENZOCAINE AND MENTHOL, UNSPECIFIED FORM 15; 2.3 MG/1; MG/1
1 LOZENGE ORAL 3 TIMES DAILY PRN
Qty: 16 LOZENGE | Refills: 0 | Status: SHIPPED | OUTPATIENT
Start: 2025-02-22

## 2025-02-22 NOTE — ED PROVIDER NOTES
Chief Complaint   Patient presents with    Sore Throat    Cough       HPI:     Rosa Guillory is a 64 year old female who presents for evaluation of sore throat over the last week with mild congestion and periodic cough.  Sick contacts: None, denies recent illness or exposure or previous history of bronchitis or pneumonia.  Patient taking over-the-counter throat lozenges without antipyretics, afebrile on arrival.  Denies associated headache dizziness earache dysphagia neck pain chest pain shortness of breath abdominal pain vomiting diarrhea dysuria rash.      PFSH    PFSH asessment screens reviewed and agree.  Nurses notes reviewed I agree with documentation.    Family History   Problem Relation Age of Onset    Hypertension Father     Heart Disease Father     Hypertension Mother     Arrhythmia Mother     Cancer Sister         cervical CA    Hypertension Sister     Hypertension Sister     Cancer Sister     Polyps Sister     Other (Other) Maternal Grandfather     Other (Other) Paternal Grandmother     Other (Other) Paternal Grandfather     No Known Problems Maternal Grandmother     No Known Problems Daughter     No Known Problems Son     No Known Problems Brother     No Known Problems Brother     No Known Problems Brother     No Known Problems Sister     No Known Problems Self     No Known Problems Maternal Aunt     No Known Problems Paternal Aunt     No Known Problems Maternal Cousin Female     No Known Problems Maternal Cousin Male     No Known Problems Paternal Cousin Female     No Known Problems Paternal Cousin Male     No Known Problems Other     Breast Cancer Neg     Ovarian Cancer Neg     DCIS Neg     LCIS Neg     BRCA gene + Neg     Ashkenazi Orthodox Descent Neg      Family history reviewed with patient/caregiver and is not pertinent to presenting problem.  Social History     Socioeconomic History    Marital status:      Spouse name: Not on file    Number of children: Not on file    Years of education:  Not on file    Highest education level: Not on file   Occupational History    Not on file   Tobacco Use    Smoking status: Never    Smokeless tobacco: Never   Vaping Use    Vaping status: Never Used   Substance and Sexual Activity    Alcohol use: No    Drug use: No    Sexual activity: Never   Other Topics Concern    Left Handed Not Asked    Right Handed Not Asked    Currently spends a great deal of time in the sun No    Past Sunlamp Treatments for Acne No    History of tanning No    Hx of Spending Great Deal of Time in Sun No    Bad sunburns in the past No    Tanning Salons in the Past No    Hx of Radiation Treatments No    Regular use of sun block No     Service Not Asked    Blood Transfusions Not Asked    Caffeine Concern No    Occupational Exposure Not Asked    Hobby Hazards Not Asked    Sleep Concern Not Asked    Stress Concern Not Asked    Weight Concern Not Asked    Special Diet Not Asked    Back Care Not Asked    Exercise Yes    Bike Helmet Yes     Comment: HEP    Seat Belt Not Asked    Self-Exams Not Asked    Grew up on a farm Not Asked    Outdoor occupation Not Asked    Pt has a pacemaker Not Asked    Pt has a defibrillator Not Asked    Breast feeding Not Asked    Reaction to local anesthetic No   Social History Narrative    The patient uses the following assistive device(s):  quad cane.      The patient does live in a home with stairs.     Social Drivers of Health     Food Insecurity: Not on file   Transportation Needs: Not on file   Housing Stability: Not on file         ROS:   Positive for stated complaint: Sore throat congestion.  All other systems reviewed and negative except as noted above.  Constitutional and Vital Signs Reviewed.      Physical Exam:     Findings:    BP (!) 167/66   Pulse 63   Temp 97.9 °F (36.6 °C)   Resp 18   SpO2 98%   GENERAL: well developed, well nourished, well hydrated, no distress  SKIN: good skin turgor, no obvious rashes  NECK:  No nuchal rigidity.  no  adenopathy  EXTREMITIES: no cyanosis or edema. LEONARD without difficulty  GI: soft, non-tender, normal bowel sounds  HEAD: normocephalic, atraumatic  EYES: sclera non icteric bilateral, conjunctiva clear  EARS: TMs clear bilaterally. Canals clear.  NOSE: Mild rhinorrhea.  MMM.  Nasal turbinates: pink, normal mucosa  THROAT: Mild erythema posterior pharynx tonsils +1 of 4 bilaterally no visualized PTA. without exudates, uvula midline, and airway patent  LUNGS: clear to auscultation bilaterally; no rales, rhonchi, or wheezes  NEURO: No focal deficits  PSYCH: Alert and oriented x3.  Answering questions appropriately.  Mood appropriate.    MDM/Assessment/Plan:   Orders for this encounter:    Orders Placed This Encounter    POCT Rapid Strep    Rapid SARS-CoV-2 by PCR     Order Specific Question:   Release to patient     Answer:   Immediate    POCT Rapid Strep    Benzocaine-Menthol (CEPACOL) 15-2.3 MG Mouth/Throat Lozenge     Sig: Use as directed 1 lozenge in the mouth or throat 3 (three) times daily as needed.     Dispense:  16 lozenge     Refill:  0    fluticasone propionate 50 MCG/ACT Nasal Suspension     Si spray by Nasal route in the morning and 1 spray before bedtime. Do all this for 10 days.     Dispense:  16 g     Refill:  0       Labs performed this visit:  Recent Results (from the past 10 hours)   Rapid SARS-CoV-2 by PCR    Collection Time: 25  8:10 AM    Specimen: Nares; Other   Result Value Ref Range    Rapid SARS-CoV-2 by PCR Not Detected Not Detected   POCT Rapid Strep    Collection Time: 25  8:20 AM   Result Value Ref Range    POCT Rapid Strep Negative Negative       MDM:  Patient swabs negative, patient and family agree via translation on supportive measures without antibiotics based on presentation and agreeable with no radiographs based on history instructed on changes warranting outpatient versus emergent reevaluation, happy with plan of care.  Alert nontoxic.    Diagnosis:    ICD-10-CM     1. Viral pharyngitis  J02.9           All results reviewed and discussed with patient.  See AVS for detailed discharge instructions for your condition today.    Follow Up with:  Carolann Reese MD  [address] [id number] [phone]    Schedule an appointment as soon as possible for a visit in 3 days  As needed, If symptoms worsen

## 2025-02-24 RX ORDER — ASPIRIN 81 MG/1
81 TABLET, COATED ORAL DAILY
Qty: 90 TABLET | Refills: 0 | Status: SHIPPED | OUTPATIENT
Start: 2025-02-24

## 2025-03-22 ENCOUNTER — LAB ENCOUNTER (OUTPATIENT)
Dept: LAB | Facility: HOSPITAL | Age: 65
End: 2025-03-22
Attending: FAMILY MEDICINE
Payer: MEDICARE

## 2025-03-22 DIAGNOSIS — Z00.00 HEALTHCARE MAINTENANCE: ICD-10-CM

## 2025-03-22 LAB
ALBUMIN SERPL-MCNC: 4.2 G/DL (ref 3.2–4.8)
ALBUMIN/GLOB SERPL: 1.3 {RATIO} (ref 1–2)
ALP LIVER SERPL-CCNC: 81 U/L
ALT SERPL-CCNC: 18 U/L
ANION GAP SERPL CALC-SCNC: 3 MMOL/L (ref 0–18)
AST SERPL-CCNC: 17 U/L (ref ?–34)
BASOPHILS # BLD AUTO: 0.05 X10(3) UL (ref 0–0.2)
BASOPHILS NFR BLD AUTO: 0.7 %
BILIRUB SERPL-MCNC: 0.5 MG/DL (ref 0.2–1.1)
BILIRUB UR QL: NEGATIVE
BUN BLD-MCNC: 19 MG/DL (ref 9–23)
BUN/CREAT SERPL: 27.5 (ref 10–20)
CALCIUM BLD-MCNC: 8.6 MG/DL (ref 8.7–10.4)
CHLORIDE SERPL-SCNC: 106 MMOL/L (ref 98–112)
CHOLEST SERPL-MCNC: 135 MG/DL (ref ?–200)
CLARITY UR: CLEAR
CO2 SERPL-SCNC: 29 MMOL/L (ref 21–32)
CREAT BLD-MCNC: 0.69 MG/DL
CREAT UR-SCNC: 99.4 MG/DL
DEPRECATED RDW RBC AUTO: 43.4 FL (ref 35.1–46.3)
EGFRCR SERPLBLD CKD-EPI 2021: 97 ML/MIN/1.73M2 (ref 60–?)
EOSINOPHIL # BLD AUTO: 0.28 X10(3) UL (ref 0–0.7)
EOSINOPHIL NFR BLD AUTO: 4.1 %
ERYTHROCYTE [DISTWIDTH] IN BLOOD BY AUTOMATED COUNT: 13.2 % (ref 11–15)
EST. AVERAGE GLUCOSE BLD GHB EST-MCNC: 143 MG/DL (ref 68–126)
FASTING PATIENT LIPID ANSWER: YES
FASTING STATUS PATIENT QL REPORTED: YES
GLOBULIN PLAS-MCNC: 3.3 G/DL (ref 2–3.5)
GLUCOSE BLD-MCNC: 107 MG/DL (ref 70–99)
GLUCOSE UR-MCNC: NORMAL MG/DL
HBA1C MFR BLD: 6.6 % (ref ?–5.7)
HCT VFR BLD AUTO: 39.8 %
HDLC SERPL-MCNC: 47 MG/DL (ref 40–59)
HGB BLD-MCNC: 12.8 G/DL
HGB UR QL STRIP.AUTO: NEGATIVE
IMM GRANULOCYTES # BLD AUTO: 0.02 X10(3) UL (ref 0–1)
IMM GRANULOCYTES NFR BLD: 0.3 %
KETONES UR-MCNC: NEGATIVE MG/DL
LDLC SERPL CALC-MCNC: 70 MG/DL (ref ?–100)
LEUKOCYTE ESTERASE UR QL STRIP.AUTO: 250
LYMPHOCYTES # BLD AUTO: 2.29 X10(3) UL (ref 1–4)
LYMPHOCYTES NFR BLD AUTO: 33.8 %
MCH RBC QN AUTO: 28.7 PG (ref 26–34)
MCHC RBC AUTO-ENTMCNC: 32.2 G/DL (ref 31–37)
MCV RBC AUTO: 89.2 FL
MICROALBUMIN UR-MCNC: <0.3 MG/DL
MONOCYTES # BLD AUTO: 0.67 X10(3) UL (ref 0.1–1)
MONOCYTES NFR BLD AUTO: 9.9 %
NEUTROPHILS # BLD AUTO: 3.46 X10 (3) UL (ref 1.5–7.7)
NEUTROPHILS # BLD AUTO: 3.46 X10(3) UL (ref 1.5–7.7)
NEUTROPHILS NFR BLD AUTO: 51.2 %
NITRITE UR QL STRIP.AUTO: NEGATIVE
NONHDLC SERPL-MCNC: 88 MG/DL (ref ?–130)
OSMOLALITY SERPL CALC.SUM OF ELEC: 289 MOSM/KG (ref 275–295)
PH UR: 6.5 [PH] (ref 5–8)
PLATELET # BLD AUTO: 278 10(3)UL (ref 150–450)
POTASSIUM SERPL-SCNC: 4.4 MMOL/L (ref 3.5–5.1)
PROT SERPL-MCNC: 7.5 G/DL (ref 5.7–8.2)
PROT UR-MCNC: NEGATIVE MG/DL
RBC # BLD AUTO: 4.46 X10(6)UL
SODIUM SERPL-SCNC: 138 MMOL/L (ref 136–145)
SP GR UR STRIP: 1.02 (ref 1–1.03)
TRIGL SERPL-MCNC: 96 MG/DL (ref 30–149)
UROBILINOGEN UR STRIP-ACNC: NORMAL
VIT D+METAB SERPL-MCNC: 40.2 NG/ML (ref 30–100)
VLDLC SERPL CALC-MCNC: 15 MG/DL (ref 0–30)
WBC # BLD AUTO: 6.8 X10(3) UL (ref 4–11)

## 2025-03-22 PROCEDURE — 82570 ASSAY OF URINE CREATININE: CPT | Performed by: FAMILY MEDICINE

## 2025-03-22 PROCEDURE — 80053 COMPREHEN METABOLIC PANEL: CPT | Performed by: FAMILY MEDICINE

## 2025-03-22 PROCEDURE — 80061 LIPID PANEL: CPT | Performed by: FAMILY MEDICINE

## 2025-03-22 PROCEDURE — 81001 URINALYSIS AUTO W/SCOPE: CPT | Performed by: FAMILY MEDICINE

## 2025-03-22 PROCEDURE — 85025 COMPLETE CBC W/AUTO DIFF WBC: CPT | Performed by: FAMILY MEDICINE

## 2025-03-22 PROCEDURE — 36415 COLL VENOUS BLD VENIPUNCTURE: CPT | Performed by: FAMILY MEDICINE

## 2025-03-22 PROCEDURE — 83036 HEMOGLOBIN GLYCOSYLATED A1C: CPT | Performed by: FAMILY MEDICINE

## 2025-03-22 PROCEDURE — 82043 UR ALBUMIN QUANTITATIVE: CPT | Performed by: FAMILY MEDICINE

## 2025-03-22 PROCEDURE — 82306 VITAMIN D 25 HYDROXY: CPT

## 2025-03-26 ENCOUNTER — PATIENT OUTREACH (OUTPATIENT)
Dept: CASE MANAGEMENT | Age: 65
End: 2025-03-26

## 2025-03-26 DIAGNOSIS — M79.7 FIBROMYALGIA: ICD-10-CM

## 2025-03-26 DIAGNOSIS — M81.0 AGE-RELATED OSTEOPOROSIS WITHOUT CURRENT PATHOLOGICAL FRACTURE: ICD-10-CM

## 2025-03-26 DIAGNOSIS — I10 ESSENTIAL HYPERTENSION: ICD-10-CM

## 2025-03-26 DIAGNOSIS — E11.9 TYPE 2 DIABETES MELLITUS WITHOUT COMPLICATION, WITHOUT LONG-TERM CURRENT USE OF INSULIN (HCC): Primary | ICD-10-CM

## 2025-03-26 DIAGNOSIS — H35.033 HYPERTENSIVE RETINOPATHY OF BOTH EYES: ICD-10-CM

## 2025-03-26 DIAGNOSIS — F32.4 MAJOR DEPRESSIVE DISORDER WITH SINGLE EPISODE, IN PARTIAL REMISSION: ICD-10-CM

## 2025-03-26 NOTE — PROGRESS NOTES
3/26/2025  Spoke to Rosa for CCM.      Updates to patient care team/comments:  UTD/Reviewed with patient  Patient reported changes in medications: None   Med Adherence  Comment: taking as prescribed     Health Maintenance: UTD/Reviewed with patient  Health Maintenance   Topic Date Due    Pneumococcal Vaccine: 50+ Years (1 of 2 - PCV) Never done Rosa is refusing vaccines    COVID-19 Vaccine (6 - 2024-25 season) 09/01/2024 prefers not to get    Diabetes Care Dilated Eye Exam  10/28/2024    Annual Well Visit  01/01/2025    Diabetes Care: Foot Exam (Annual)  Never done    Colorectal Cancer Screening  09/08/2025    Pap Smear  09/22/2025    Influenza Vaccine (1) 06/30/2025 (Originally 10/1/2024)    Zoster Vaccines (1 of 2) 11/28/2025 (Originally 11/8/2010)    Mammogram  07/29/2025    Diabetes Care A1C  09/22/2025    Diabetes Care: GFR  03/22/2026    DTaP,Tdap,and Td Vaccines (2 - Td or Tdap) 04/14/2028    Annual Depression Screening  Completed    Diabetes Care: Microalb/Creat Ratio (Annual)  Completed    Meningococcal B Vaccine  Aged Out       Patient updates/concerns:     The patient is reporting to CCM to be doing well, she is feeling fine overall. The patient is reporting to be taking her medications as prescribed, she has no concerns or side effects to them.     The patient will be seeing PCP next month, she had labs done as part of her follow up and such results will be discussed during her visit. Rosa is also scheduled to se Dr Hairston in June and at that visit she will be getting her annual physical done.    The patient tries to exercise as much as possible and does home exercises but feels that pain can be triggered whit activities, and for so she does less some days.    The patient is asking CCM to assist in calling her mail order pharmacy, she needs a refill on her alendronate, the system shows that a rx was sent to them on 02/17/2025 .    The patient denies any recent weight change. The patient  is aware  she needs vaccines but is hesitant to get tehm and prefers to postpone them for now.    Goals/Action Plan:    Active goal from previous outreach: be able to lower her sugar levels    Patient reported progress towards goals: ongoing and working towards her goal               - What: be able to lower her sugar levels           - Where/When/How: the patient plans to eat less carb's, adjust her diet to healthier options.  Patient Reported Barriers and Concerns: None                   - Plan for overcoming barriers: N/A    Care Managers Interventions:     CCM called the patient's mail order pharmacy and per Reina, they do have a current Rx for Alendronate. The pharmacy will be sending the medicine today, she has a zero dollar copay. The patient would need a new Rx for additional refills.  3 mins with Reina.      Advised patient to focus on eating healthier meals, keep doctor's appointment, and increase exercise as tolerated.     Continued to provide education on how to better manage and cope with chronic conditions. Informed of the importance on reporting any new symptoms to prevent any health complications    Reconciled the patient's chart using care everywhere.    Updated health maintenance: Recommended patient about getting shingrix and covid vaccine from local pharmacy.     Immunization Query completed: No updates available.    Encouraged patient to call as needed.     Future Appointments:   Future Appointments   Date Time Provider Department Center   4/7/2025 10:40 AM Carolann Reese MD [id number] [facility]   6/2/2025 11:20 AM Carolann Reese MD [id number]   10/6/2025 10:00 AM Roderick Pisano MD [id number] Care Manager Follow Up Date: in a month or sooner.    Reason For Follow Up: review progress and or barriers towards patient's goals.     Time Spent This Encounter Total: 22 min medical record review, telephone communication, care plan updates where needed, education,  goals, and action plan recreation/update. Provided acknowledgment and validation to patient's concerns.   Monthly Minute Total including today: 22  Physical assessment, complete health history, and need for CCM established by Carolann Horton MD.

## 2025-04-07 ENCOUNTER — OFFICE VISIT (OUTPATIENT)
Age: 65
End: 2025-04-07
Payer: COMMERCIAL

## 2025-04-07 VITALS
HEART RATE: 77 BPM | WEIGHT: 203 LBS | SYSTOLIC BLOOD PRESSURE: 104 MMHG | BODY MASS INDEX: 39.85 KG/M2 | HEIGHT: 60 IN | TEMPERATURE: 98 F | OXYGEN SATURATION: 96 % | DIASTOLIC BLOOD PRESSURE: 62 MMHG

## 2025-04-07 DIAGNOSIS — I10 ESSENTIAL HYPERTENSION: ICD-10-CM

## 2025-04-07 DIAGNOSIS — L30.4 INTERTRIGO: ICD-10-CM

## 2025-04-07 DIAGNOSIS — E11.9 TYPE 2 DIABETES MELLITUS WITHOUT COMPLICATION, WITHOUT LONG-TERM CURRENT USE OF INSULIN (HCC): ICD-10-CM

## 2025-04-07 DIAGNOSIS — F32.4 MAJOR DEPRESSIVE DISORDER WITH SINGLE EPISODE, IN PARTIAL REMISSION: ICD-10-CM

## 2025-04-07 DIAGNOSIS — M79.7 FIBROMYALGIA: ICD-10-CM

## 2025-04-07 DIAGNOSIS — M81.0 AGE-RELATED OSTEOPOROSIS WITHOUT CURRENT PATHOLOGICAL FRACTURE: ICD-10-CM

## 2025-04-07 DIAGNOSIS — Z12.11 COLON CANCER SCREENING: Primary | ICD-10-CM

## 2025-04-07 DIAGNOSIS — E78.00 HYPERCHOLESTEREMIA: ICD-10-CM

## 2025-04-07 RX ORDER — ALENDRONATE SODIUM 70 MG/1
70 TABLET ORAL WEEKLY
Qty: 12 TABLET | Refills: 0 | Status: SHIPPED | OUTPATIENT
Start: 2025-04-07 | End: 2025-04-07

## 2025-04-07 RX ORDER — KETOCONAZOLE 20 MG/G
1 CREAM TOPICAL 2 TIMES DAILY PRN
Qty: 60 G | Refills: 0 | Status: SHIPPED | OUTPATIENT
Start: 2025-04-07

## 2025-04-07 RX ORDER — ALENDRONATE SODIUM 70 MG/1
70 TABLET ORAL WEEKLY
Qty: 12 TABLET | Refills: 3 | Status: SHIPPED | OUTPATIENT
Start: 2025-04-07

## 2025-04-07 RX ORDER — GABAPENTIN 300 MG/1
300 CAPSULE ORAL NIGHTLY
Qty: 90 CAPSULE | Refills: 3 | Status: SHIPPED | OUTPATIENT
Start: 2025-04-07

## 2025-04-07 RX ORDER — METOPROLOL SUCCINATE 25 MG/1
12.5 TABLET, EXTENDED RELEASE ORAL DAILY
Qty: 45 TABLET | Refills: 1 | Status: SHIPPED | OUTPATIENT
Start: 2025-04-07

## 2025-04-07 RX ORDER — METOPROLOL SUCCINATE 25 MG/1
12.5 TABLET, EXTENDED RELEASE ORAL DAILY
Qty: 45 TABLET | Refills: 1 | Status: SHIPPED | OUTPATIENT
Start: 2025-04-07 | End: 2025-04-07

## 2025-04-07 RX ORDER — PRAVASTATIN SODIUM 20 MG
20 TABLET ORAL NIGHTLY
Qty: 90 TABLET | Refills: 3 | Status: SHIPPED | OUTPATIENT
Start: 2025-04-07

## 2025-04-07 RX ORDER — PAROXETINE 10 MG/1
10 TABLET, FILM COATED ORAL EVERY MORNING
Qty: 90 TABLET | Refills: 3 | Status: SHIPPED | OUTPATIENT
Start: 2025-04-07

## 2025-04-07 NOTE — PROGRESS NOTES
HPI:     Chief Complaint   Patient presents with    Follow - Up       Rosa Guillory is a 64 year old female presenting for:  HTN/HLD  -Taking meds as prescribed-tolerating well without SEs.   -Denies CP, Palpitations, Dizziness, leg edema, SOB, LOC, HA    GERD-> stable    Fibromyalgia-> stable    Depression-> stable     DM-> no wounds in feet. asymptomatic    Osteoporosis-> sttable    Having a rash underneath left breast. No pain. Sometimes itchy      Results for orders placed or performed in visit on 03/22/25   Vitamin D, 25-Hydroxy    Collection Time: 03/22/25  8:05 AM   Result Value Ref Range    Vitamin D, 25OH, Total 40.2 30.0 - 100.0 ng/mL       Labs:   Lab Results   Component Value Date/Time    A1C 6.6 (H) 03/22/2025 08:05 AM      Lab Results   Component Value Date/Time    CHOLEST 135 03/22/2025 08:05 AM    HDL 47 03/22/2025 08:05 AM    TRIG 96 03/22/2025 08:05 AM    LDL 70 03/22/2025 08:05 AM    NONHDLC 88 03/22/2025 08:05 AM       Lab Results   Component Value Date/Time     (H) 03/22/2025 08:05 AM     03/22/2025 08:05 AM    K 4.4 03/22/2025 08:05 AM     03/22/2025 08:05 AM    CO2 29.0 03/22/2025 08:05 AM    CREATSERUM 0.69 03/22/2025 08:05 AM    CA 8.6 (L) 03/22/2025 08:05 AM    ALB 4.2 03/22/2025 08:05 AM    TP 7.5 03/22/2025 08:05 AM    ALKPHO 81 03/22/2025 08:05 AM    AST 17 03/22/2025 08:05 AM    ALT 18 03/22/2025 08:05 AM    BILT 0.5 03/22/2025 08:05 AM    TSH 2.740 07/16/2022 08:05 AM          Medications:  Current Outpatient Medications   Medication Sig Dispense Refill    ketoconazole 2 % External Cream Apply 1 Application topically 2 (two) times daily as needed (rash). 60 g 0    metoprolol succinate ER 25 MG Oral Tablet 24 Hr Take 0.5 tablets (12.5 mg total) by mouth daily. 45 tablet 1    alendronate 70 MG Oral Tab Take 1 tablet (70 mg total) by mouth once a week. 12 tablet 3    gabapentin 300 MG Oral Cap Take 1 capsule (300 mg total) by mouth nightly. 90 capsule 3    pravastatin  20 MG Oral Tab Take 1 tablet (20 mg total) by mouth nightly. 90 tablet 3    PARoxetine 10 MG Oral Tab Take 1 tablet (10 mg total) by mouth every morning. 90 tablet 3    ASPIRIN LOW DOSE 81 MG Oral Tab EC TAKE 1 TABLET BY MOUTH EVERY DAY 90 tablet 0    PANTOPRAZOLE 40 MG Oral Tab EC TAKE 1 TABLET BY MOUTH BEFORE  BREAKFAST 100 tablet 2    diclofenac 1 % External Gel Apply 4 g topically 4 (four) times daily. 100 g 1      Past Medical History:    Anxiety state, unspecified    Calcified granuloma of lung    Carpal tunnel syndrome on both sides    Cataract    Essential hypertension    Fibromyalgia    Hyperlipidemia    Meibomian gland dysfunction    Schirmer 28mm/26mm    Obesity    Osteoarthritis    Sleep apnea    Visual impairment         Past Surgical History:   Procedure Laterality Date    Carpal tunnel release Right 2006    Carpal tunnel release Left 10/14/2014    Knee arthroscopy Left 01/24/2018    partial medial and lateral meniscectomies     No Known Allergies   Social History:  Social History     Socioeconomic History    Marital status:    Tobacco Use    Smoking status: Never    Smokeless tobacco: Never   Vaping Use    Vaping status: Never Used   Substance and Sexual Activity    Alcohol use: No    Drug use: No    Sexual activity: Never   Other Topics Concern    Currently spends a great deal of time in the sun No    Past Sunlamp Treatments for Acne No    History of tanning No    Hx of Spending Great Deal of Time in Sun No    Bad sunburns in the past No    Tanning Salons in the Past No    Hx of Radiation Treatments No    Regular use of sun block No    Caffeine Concern No    Exercise Yes    Bike Helmet Yes     Comment: HEP    Reaction to local anesthetic No   Social History Narrative    The patient uses the following assistive device(s):  quad cane.      The patient does live in a home with stairs.      Family History:  Family History   Problem Relation Age of Onset    Hypertension Father     Heart Disease  Father     Hypertension Mother     Arrhythmia Mother     Cancer Sister         cervical CA    Hypertension Sister     Hypertension Sister     Cancer Sister     Polyps Sister     Other (Other) Maternal Grandfather     Other (Other) Paternal Grandmother     Other (Other) Paternal Grandfather     No Known Problems Maternal Grandmother     No Known Problems Daughter     No Known Problems Son     No Known Problems Brother     No Known Problems Brother     No Known Problems Brother     No Known Problems Sister     No Known Problems Self     No Known Problems Maternal Aunt     No Known Problems Paternal Aunt     No Known Problems Maternal Cousin Female     No Known Problems Maternal Cousin Male     No Known Problems Paternal Cousin Female     No Known Problems Paternal Cousin Male     No Known Problems Other     Breast Cancer Neg     Ovarian Cancer Neg     DCIS Neg     LCIS Neg     BRCA gene + Neg     [employer] Descent Neg           REVIEW OF SYSTEMS:   Review of Systems   Constitutional:  Negative for fatigue and fever.   Respiratory:  Negative for cough and shortness of breath.    Cardiovascular:  Negative for chest pain, palpitations and leg swelling.   Gastrointestinal:  Negative for vomiting, abdominal pain, diarrhea and constipation.   Musculoskeletal:  Positive for myalgias and joint pain.   Skin:  Positive for rash.   Neurological:  Negative for headaches.            PHYSICAL EXAM:   /62   Pulse 77   Temp 98.2 °F (36.8 °C)   Ht 5' (1.524 m)   Wt 203 lb (92.1 kg)   SpO2 96%   BMI 39.65 kg/m²  Estimated body mass index is 39.65 kg/m² as calculated from the following:    Height as of this encounter: 5' (1.524 m).    Weight as of this encounter: 203 lb (92.1 kg).     Wt Readings from Last 3 Encounters:   04/07/25 203 lb (92.1 kg)   10/07/24 200 lb 11.2 oz (91 kg)   07/31/24 195 lb (88.5 kg)       Physical Exam   Vitals reviewed.   Constitutional: She appears well-developed. No distress.    Cardiovascular:  Normal rate, regular rhythm and normal heart sounds.            No murmur heard.   Edema not present.  Pulmonary/Chest: Effort normal and breath sounds normal. No respiratory distress.   Abdominal: Soft. Bowel sounds are normal. She exhibits no distension. There is no abdominal tenderness.   Neurological: No cranial nerve deficit.   Skin: Rash (left breast crease with erythematous patch) noted.           ASSESSMENT AND PLAN:   Patient is a 64 year old female who presents primarily presents for:    Diagnoses and all orders for this visit:    Colon cancer screening  -     [facility] hospital GI Telephone Colon Screen; Future    Age-related osteoporosis without current pathological fracture  -     alendronate 70 MG Oral Tab; Take 1 tablet (70 mg total) by mouth once a week.    Essential hypertension  -     metoprolol succinate ER 25 MG Oral Tablet 24 Hr; Take 0.5 tablets (12.5 mg total) by mouth daily.  -stable; controlled  -continue meds  -watch salt intake, regular exercise, DASH/heart healthy eating  -monitor home BP regularly and maintain log; if elevated reading >160/100 notify Md.    Type 2 diabetes mellitus without complication, without long-term current use of insulin ([facility])  -     Ophthalmology Referral - In Network  Diabetic control is well controlled  Last A1c value was 6.6% done 3/22/2025.    Recommendations are:   Will CPM: diet-controlled  Advised weight and diabetic/lipid management with carbohydrate controlled ADA and/or low fat/cholesterol DASH diet and exercise (3 times a week for 30+ minutes each time)  Refer to Ophthalmology annually for routine eye exam  Check feet daily.  Podiatry evaluation prn.      Intertrigo  -     ketoconazole 2 % External Cream; Apply 1 Application topically 2 (two) times daily as needed (rash).  -supportive care    Fibromyalgia  -     gabapentin 300 MG Oral Cap; Take 1 capsule (300 mg total) by mouth nightly.    Hypercholesteremia  -     pravastatin 20 MG Oral Tab; Take 1  tablet (20 mg total) by mouth nightly.    Major depressive disorder with single episode, in partial remission  -     PARoxetine 10 MG Oral Tab; Take 1 tablet (10 mg total) by mouth every morning.                       Return in about 6 months (around 10/7/2025) for physical, pap.  Patient indicates understanding of the above recommendations and agrees to the above plan.  Reasurrance and education provided. All questions answered.  Notified to call with any questions, complications, allergies, or worsening or changing symptoms as well as any side effects or complications from the treatments .  Red flags/ ER precautions discussed.    Spent 40 minutes including chart review, reviewing appropriate medical history, evaluating patient, discussing treatment options, counseling and education (diet and exercise), ordering appropriate diagnostic tests and completing documentation.          Meds & Refills for this Visit:  Requested Prescriptions     Signed Prescriptions Disp Refills    ketoconazole 2 % External Cream 60 g 0     Sig: Apply 1 Application topically 2 (two) times daily as needed (rash).    metoprolol succinate ER 25 MG Oral Tablet 24 Hr 45 tablet 1     Sig: Take 0.5 tablets (12.5 mg total) by mouth daily.    alendronate 70 MG Oral Tab 12 tablet 3     Sig: Take 1 tablet (70 mg total) by mouth once a week.    gabapentin 300 MG Oral Cap 90 capsule 3     Sig: Take 1 capsule (300 mg total) by mouth nightly.    pravastatin 20 MG Oral Tab 90 tablet 3     Sig: Take 1 tablet (20 mg total) by mouth nightly.    PARoxetine 10 MG Oral Tab 90 tablet 3     Sig: Take 1 tablet (10 mg total) by mouth every morning.       No orders of the defined types were placed in this encounter.      Imaging & Consults:  OPHTHALMOLOGY - INTERNAL  OP REFERRAL TO [facility] GI TELEPHONE COLON SCREEN    Health Maintenance:  Influenza Vaccine(1) due on 09/01/2019  Annual Depression Screen due on 04/16/2020  Annual Physical due on 04/23/2020  Mammogram due  on 06/01/2020  Pap Smear,5 Years due on 12/08/2020  Colonoscopy due on 09/08/2025      Carolann Horton MD

## 2025-04-17 ENCOUNTER — NURSE ONLY (OUTPATIENT)
Facility: CLINIC | Age: 65
End: 2025-04-17

## 2025-04-17 DIAGNOSIS — Z12.11 COLON CANCER SCREENING: Primary | ICD-10-CM

## 2025-04-17 NOTE — PROGRESS NOTES
GI Staff:  TCS Colon Screening Orders    Please schedule: Colonoscopy [address] with MAC     Please send split dose Golytely bowel prep     Diagnosis: Colon Screening Z12.11     Medication adjustments:  None     >>>Please inform patient if new medications are started after scheduling procedure they need to call clinic to notify us.

## 2025-04-17 NOTE — PROGRESS NOTES
Called patient for scheduled TCS/FIT+ result.   Medications, pharmacy, and allergies reviewed.   Please advise on colonoscopy and bowel prep orders.     Age 45-76 y/o:   › MD preference: none   › Insurance:  [facility]   › Last PCP visit: 4/7/2025  Pcp within [facility]: Carolann Horton MD   › Last CBC: 3/22/2025   › Date of positive FIT: n/a  › H/W/BMI: 60 in / 203 lbs / 39.65 kg/m²     Special comments/notes:  Recall    Last Procedure, Date, MD:    09/08/2015 Esophagogastroduodenoscopy (EGD) and Colonoscopy - Dr Viveros   Last diagnosis:  Colon screen   Recalled for (mth/yrs):  10 years    Sedation used previously:  MAC    Last Prep Used:    Quality of Prep:      Telephone Colon Screening Questionnaire Yes No   Are you currently experiencing any new/abnormal GI symptoms? [] [x]   If yes, explain:                              Rectal bleeding?            [] [x]   Black stool?              [] [x]   Dysphagia or food \"feeling stuck\" when eating?    [] [x]   Intractable vomiting?          [] [x]   Unexplained weight loss?                        [] [x]   First colonoscopy?                         [] [x]   Family history of colon cancer?           [] [x]   Any issues with anesthesia?                   [] [x]   If yes, explain:                                   Any recent complaints of chest pain or shortness of breath?  [] [x]   Referred to a cardiologist?  [] [x]   If yes, explain:             Stroke, MI (heart attack) or stent placement in the last 12 months:  [] [x]   History of  respiratory issues/oxygen/IZAIAH/COPD: [] [x]   If yes, CPAP/BiPAP:                                    History of devices (pacemaker/defibrillator) [] [x]   History of cardiac/CVA/(MI/stroke):           [] [x]     Medications Yes  No   Anticoagulants (except Aspirin):                [] [x]   Diabetic Meds                                                   PO: Hold day before and day of procedure  Insulin:                                     [] [x]   Weight loss meds (phentermine/vyvanse/saxsenda/etc):                   [] [x]   Iron/herbal/multivitamin supplement:                          [] [x]   Usage of marijuana, CBD &/or vape products:                        [] [x]

## 2025-04-17 NOTE — PROGRESS NOTES
Scheduled for:  Colonoscopy [address]   Location:  [facility] ([facility])  23+15=38  Provider: Prateek Andrew MD    Date:  8/25/2025 Monday  Time:   2:50 pm  (Patient made aware will receive phone call the day before with an arrival time)    Sedation:  MAC  Prep:  Split GoLytely    Diagnosis with codes:  Colon cancer screening [Z12.11]    Meds/Allergies Reconciled?:   Provider Reviewed   Was patient informed to call insurance with codes (Y/N):  Yes  Referral sent?: Referral was sent at the time of electronic surgical scheduling.  [facility] or [facility] notified?: I sent an electronic request to ENDO Scheduling and received a confirmation today.     Medication Orders:  Patient is aware to NOT take iron pills, herbal meds and diet supplements for 7 days before exam. Also to NOT take any form of alcohol, recreational drugs and any forms of ED meds 24 hours before exam.     Misc Orders:  N/A   Further instructions given by staff:  I Provided prep instructions to patient and reviewed date, time and location. Patient verbalized that  She / Her understood and is aware to call with any questions.  Patient was informed about the new cancellation policy for She / Her procedure. Patient was also given copy of the cancellation policy at the time of the appointment and verbalized understanding.

## 2025-04-24 ENCOUNTER — PATIENT OUTREACH (OUTPATIENT)
Dept: CASE MANAGEMENT | Age: 65
End: 2025-04-24

## 2025-04-24 DIAGNOSIS — E78.00 HYPERCHOLESTEREMIA: ICD-10-CM

## 2025-04-24 DIAGNOSIS — M79.7 FIBROMYALGIA: ICD-10-CM

## 2025-04-24 DIAGNOSIS — M81.0 AGE-RELATED OSTEOPOROSIS WITHOUT CURRENT PATHOLOGICAL FRACTURE: ICD-10-CM

## 2025-04-24 DIAGNOSIS — M47.816 SPONDYLOSIS OF LUMBAR REGION WITHOUT MYELOPATHY OR RADICULOPATHY: ICD-10-CM

## 2025-04-24 DIAGNOSIS — E11.9 TYPE 2 DIABETES MELLITUS WITHOUT COMPLICATION, WITHOUT LONG-TERM CURRENT USE OF INSULIN (HCC): Primary | ICD-10-CM

## 2025-04-24 DIAGNOSIS — I10 ESSENTIAL HYPERTENSION: ICD-10-CM

## 2025-04-24 DIAGNOSIS — H25.13 AGE-RELATED NUCLEAR CATARACT OF BOTH EYES: ICD-10-CM

## 2025-04-24 NOTE — PROGRESS NOTES
4/24/2025  Spoke to Rosa for CCM.      Updates to patient care team/comments:  UTD/Reviewed with patient  Patient reported changes in medications: None   Med Adherence  Comment: taking as prescribed.     Health Maintenance: UTD/Reviewed with patient  Health Maintenance   Topic Date Due    Diabetes Care Dilated Eye Exam  10/28/2024 scheduled for 05/28    Annual Well Visit  01/01/2025 scheduled for 10/13    Diabetes Care: Foot Exam (Annual)  Never done    Colorectal Cancer Screening  09/08/2025 scheduled for 08/25    Pap Smear  09/22/2025 schedule for 09/22    Zoster Vaccines (1 of 2) 11/28/2025 (Originally 11/8/2010)    Pneumococcal Vaccine: 50+ Years (1 of 2 - PCV) 04/27/2026 (Originally [birth year]1979)    COVID-19 Vaccine (6 - 2024-25 season) 04/27/2026 (Originally 9/1/2024)    Mammogram  07/29/2025    Diabetes Care A1C  09/22/2025    Influenza Vaccine (Season Ended) 10/01/2025    Diabetes Care: GFR  03/22/2026    DTaP,Tdap,and Td Vaccines (2 - Td or Tdap) 04/14/2028    Annual Depression Screening  Completed    Diabetes Care: Microalb/Creat Ratio (Annual)  Completed    Meningococcal B Vaccine  Aged Out       Patient updates/concerns:     The patient is reporting to CCM that she is doing good, she feels fine now that the weather is getting nicer she feels her pains minimize when is hotter outside. Rosa is taking medications as prescribed and has no questions or concerns on them.    The patient was just seen by her PCP on 04/07, she was seen for a follow up to her conditions, she had refills given on all of her medications and no changes or adjustments were done to them. The patient had lab work done prior to the visit and test results were discussed during the consult. The patient was asked to get labs done again in six months, orders are already in the system. The patient will return to see PCP in October to get her annual exam done.    The patient was given orders for her to see ophthalmologist, gyne and GI. The  patient was also scheduled to see Dr Pisano in October but since she is leaving the practice a new appointment has to be schedule with a different doctor. The patient accepted CCM to assist in scheduling some of her appointments, she is already scheduled with GI.    The patient feels she is more active now that the weather is getting hotter.    The patient denies any recent weight changes.    The patient hasn't gotten any vaccines given recently.     Goals/Action Plan:    Active goal from previous outreach: stay active, motivated to exercise more    Patient reported progress towards goals: ongoing and working towards her goal               - What: stay active and exercise more           - Where/When/How: walk more, exercise more outdoors   Patient Reported Barriers and Concerns: None                   - Plan for overcoming barriers: N/A    Care Managers Interventions:     CCM called rheumatologist office and cancelled her appointment with Dr Pisano and reschedule with Dr Pierre, she is now set with her fro 10/27 at 2pm, in the [facility] building, [place] parking lot [address].  4 mins with .    CCM called the office of Dr Kaur and per Ping she is now scheduled for 09/22 at 2 PM.  3 mins with Ping    CCM called the office of Dr Lenz and per Alivia she is already scheduled for 05/28 at 930 in the [place] Location.  3 mins with Alivia.    CCM communicated the above to the patient and agreed to keep the appointment as scheduled.     Advised patient to focus on eating healthier meals, keep doctor's appointment, and increase exercise.     Continued to provide education on how to better manage and cope with chronic conditions. Informed of the importance on reporting any new symptoms to prevent any health complications    Reconciled the patient's chart using care everywhere.    Updated health maintenance: Recommended patient about getting diabetic eye exam with Dr Lenz.    Immunization Query completed: No  updates available.    Encouraged patient to call as needed.     Future Appointments:   Future Appointments   Date Time Provider Department Center   8/25/2025  2:50 PM DAFNE, PROCEDURE ECCF[facility]IPROC None   10/6/2025 10:00 AM Roderick Pisano MD [id number]  [facility]   10/13/2025  8:20 AM [id number]     Next Care Manager Follow Up Date: in a month or sooner if needed.    Reason For Follow Up: review progress and or barriers towards patient's goals.     Time Spent This Encounter Total: 30 min medical record review, telephone communication, care plan updates where needed, education, goals, and action plan recreation/update. Provided acknowledgment and validation to patient's concerns.   Monthly Minute Total including today: 30  Physical assessment, complete health history, and need for CCM established by Carolann Horton MD.

## 2025-05-05 ENCOUNTER — TELEPHONE (OUTPATIENT)
Age: 65
End: 2025-05-05

## 2025-05-05 ENCOUNTER — PATIENT OUTREACH (OUTPATIENT)
Dept: CASE MANAGEMENT | Age: 65
End: 2025-05-05

## 2025-05-05 DIAGNOSIS — M15.0 PRIMARY OSTEOARTHRITIS INVOLVING MULTIPLE JOINTS: ICD-10-CM

## 2025-05-05 DIAGNOSIS — E78.00 HYPERCHOLESTEREMIA: ICD-10-CM

## 2025-05-05 DIAGNOSIS — E11.9 TYPE 2 DIABETES MELLITUS WITHOUT COMPLICATION, WITHOUT LONG-TERM CURRENT USE OF INSULIN (HCC): Primary | ICD-10-CM

## 2025-05-05 DIAGNOSIS — M81.0 AGE-RELATED OSTEOPOROSIS WITHOUT CURRENT PATHOLOGICAL FRACTURE: ICD-10-CM

## 2025-05-05 DIAGNOSIS — I10 ESSENTIAL HYPERTENSION: ICD-10-CM

## 2025-05-05 DIAGNOSIS — Z12.31 SCREENING MAMMOGRAM FOR BREAST CANCER: Primary | ICD-10-CM

## 2025-05-05 DIAGNOSIS — M79.7 FIBROMYALGIA: ICD-10-CM

## 2025-05-05 NOTE — PROGRESS NOTES
5/5/2025  Spoke to Rosa for CCM.      Updates to patient care team/comments:  UTD/Reviewed with patient  Patient reported changes in medications: None   Med Adherence  Comment: taking as      Health Maintenance: UTD/Reviewed with patient  Health Maintenance   Topic Date Due    Diabetes Care Dilated Eye Exam  10/28/2024 scheduled to see Dr Lenz     Annual Well Visit  01/01/2025 scheduled for October with PCP    Diabetes Care: Foot Exam (Annual)  Never done    Mammogram  07/29/2025 to be scheduled    Colorectal Cancer Screening  09/08/2025 scheduled for 08/25    Pap Smear  09/22/2025    Zoster Vaccines (1 of 2) 11/28/2025 (Originally 11/8/2010)    Pneumococcal Vaccine: 50+ Years (1 of 2 - PCV) 04/27/2026 (Originally 11/8[birth year]1979)    COVID-19 Vaccine (6 - 2024-25 season) 04/27/2026 (Originally 9/1/2024)    Diabetes Care A1C  09/22/2025    Influenza Vaccine (Season Ended) 10/01/2025    Diabetes Care: GFR  03/22/2026    DTaP,Tdap,and Td Vaccines (2 - Td or Tdap) 04/14/2028    Annual Depression Screening  Completed    Diabetes Care: Microalb/Creat Ratio (Annual)  Completed    Meningococcal B Vaccine  Aged Out       Patient updates/concerns:      The patient is reporting to [facility] to be doing well, she is feeling fine and has no new health concerns. The patient has been compliant with all of her medications and has no side effects to them.    Rosa was wondering about future appointments and was asking [facility] to verify them with her so that she can make a note of them on her calendar. [facility] reviewed Rosa's record and was able to provide her with date and times of future appointments.    [facility] reminded Rosa that she was almost due to have a mammogram done, las test was performed in July of 2024, she is ok with [facility] asking for order to PCP's office and once order is approved to call outpatient scheduling department to set up an appointment for it.    The patient has not gotten any vaccines recently, she is aware she is due for  some of them but prefers to postponed them.    Goals/Action Plan:    Active goal from previous outreach:  continue to be active, get stronger    Patient reported progress towards goals: ongoing and working towards her goal               - What: continue to be active to gain more strength            - Where/When/How: exercise if tolerated, continue with medications as prescribed and follow MD's recommendations.  Patient Reported Barriers and Concerns: None                   - Plan for overcoming barriers: N/A    Care Managers Interventions:    CCM sent TE to PCP to request mammogram order.    CCM got the mammogram order signed by PCP, CCM to call outpatient scheduling dept.    CCM called the outpatient scheduling dept and per Rocio and appointment is now set for her for 08/2 at 8am  3 mins with scheduling dept     CCM communicated Rosa of her mammogram appointment and aggred to keep it as scheduled.    Future Appointments:   Future Appointments   Date Time Provider Department Center   8/25/2025  2:50 PM DAFNE, PROCEDURE E[facility]IPROC None   9/22/2025  2:20 PM Franny Kaur MD [id number]   10/13/2025  8:20 AM Carolann Reese MD [id number] [facility]   10/27/2025  2:00 PM [facility]   Dr Lenz at 930am on 05/28 at the [place] location.    Next Care Manager Follow Up Date: in a month or sooner if needed    Reason For Follow Up: review progress and or barriers towards patient's goals.     Time Spent This Encounter Total: 24 min medical record review, telephone communication, care plan updates where needed, education, goals, and action plan recreation/update. Provided acknowledgment and validation to patient's concerns.   Monthly Minute Total including today: 24  Physical assessment, complete health history, and need for CCM established by Carolann Horton MD.

## 2025-05-05 NOTE — TELEPHONE ENCOUNTER
Mammogram order needed fro test to be done around July, next PCP's appointment in October, she will see gyne in September for pap.

## 2025-05-23 DIAGNOSIS — Z79.82 LONG TERM CURRENT USE OF ASPIRIN: ICD-10-CM

## 2025-05-23 RX ORDER — ASPIRIN 81 MG/1
81 TABLET ORAL DAILY
Qty: 90 TABLET | Refills: 3 | Status: SHIPPED | OUTPATIENT
Start: 2025-05-23

## 2025-05-23 NOTE — TELEPHONE ENCOUNTER
Refill passed per Clinic protocol.  Requested Prescriptions   Pending Prescriptions Disp Refills    ASPIRIN LOW DOSE 81 MG Oral Tab EC [Pharmacy Med Name: ASPIRIN EC 81 MG TABLET] 90 tablet 0     Sig: TAKE 1 TABLET BY MOUTH EVERY DAY       Aspirin Protocol Passed - 5/23/2025  2:50 PM        Passed - In person appointment or virtual visit in the past 6 mos or appointment in next 3 mos     Recent Outpatient Visits              1 month ago Colon cancer screening    [facility]    Nurse Only    1 month ago Colon cancer screening    [facility] Carolann Reese MD    Office Visit    7 months ago Fibromyalgia    [facility] Roderick Pisano MD    Office Visit    9 months ago Encounter for annual health examination    [facility] Carolann Reese MD    Office Visit    1 year ago Encounter for screening mammogram for malignant neoplasm of breast    [facility] Carolann Reese MD    Office Visit          Future Appointments         Provider Department Appt Notes    In 2 months [address] Mammography - [facility]     In 3 months DAFNE, PROCEDURE [facility] Colonocsopy with MAC @ [facility]    In 4 months Franny Kaur MD [facility] - OB/GYN annual & pap    In 4 months Carolann Reese MD [facility] Return in about 6 months (around 10/7/2025) for physical, pap.    In 5 months Gracy Pierre MD [facility] Previous patient of Dr. SHERMAN Overton - Medication is active on med list

## 2025-06-14 ENCOUNTER — MED REC SCAN ONLY (OUTPATIENT)
Age: 65
End: 2025-06-14

## 2025-07-07 ENCOUNTER — PATIENT OUTREACH (OUTPATIENT)
Dept: CASE MANAGEMENT | Age: 65
End: 2025-07-07

## 2025-07-07 DIAGNOSIS — I10 ESSENTIAL HYPERTENSION: ICD-10-CM

## 2025-07-07 DIAGNOSIS — M79.7 FIBROMYALGIA: ICD-10-CM

## 2025-07-07 DIAGNOSIS — E11.9 TYPE 2 DIABETES MELLITUS WITHOUT COMPLICATION, WITHOUT LONG-TERM CURRENT USE OF INSULIN (HCC): Primary | ICD-10-CM

## 2025-07-07 DIAGNOSIS — M81.0 AGE-RELATED OSTEOPOROSIS WITHOUT CURRENT PATHOLOGICAL FRACTURE: ICD-10-CM

## 2025-07-07 NOTE — PROGRESS NOTES
7/7/2025  Spoke to Rosa for CCM.      Updates to patient care team/comments:  UTD/Reviewed with patient  Patient reported changes in medications: None  Med Adherence  Comment: taking all as prescribed     Health Maintenance: UTD/Reviewed with patient  Health Maintenance   Topic Date Due    Diabetes Care Foot Exam  Never done    Diabetes Care Dilated Eye Exam  10/28/2024    Annual Well Visit  01/01/2025    Mammogram  07/29/2025    Colorectal Cancer Screening  09/08/2025    Pap Smear  09/22/2025    Zoster Vaccines (1 of 2) 11/28/2025 (Originally 11/8/2010)    Pneumococcal Vaccine: 50+ Years (1 of 2 - PCV) 04/27/2026 (Originally 11/8/1979)    COVID-19 Vaccine (6 - 2024-25 season) 04/27/2026 (Originally 9/1/2024)    Diabetes Care A1C  09/22/2025    Influenza Vaccine (1) 10/01/2025    Diabetes Care: GFR  03/22/2026    DTaP,Tdap,and Td Vaccines (2 - Td or Tdap) 04/14/2028    Annual Depression Screening  Completed    Diabetes Care: Microalb/Creat Ratio (Annual)  Completed    Meningococcal B Vaccine  Aged Out       Patient updates/concerns:     The patient is reporting to [facility] to be doing well, she is feeling fine and has no new health concerns. The patient has been compliant with all of her medications and has no side effects to them.     Rosa was reminded about future appointments and was asking [facility] to verify them with her,she made a note of them. Rosa is aware she needs to get labs done prior to her visit with Dr Hairston.      The patient has not gotten any vaccines recently, she is aware she is due for some of them but prefers to postpone them.    [facility] reminded Rosa she was due for a diabetic exam and she stated she was already seen by Dr Lenz last month. CCM to  office to obtain such record.    Rosa is due to have mammogram which is already scheduled for in August 2nd.    Goals/Action Plan:    Active goal outreach:  feel good, stay healthy overall    Patient reported progress towards goals:  ongoing                - What: feel good, stay healthy overall           - Where/When/How: take medications as prescribed and keep MD's appointments.  Patient Reported Barriers and Concerns: None                   - Plan for overcoming barriers: N/A    Care Managers Interventions:     CCM called the office of Dr Lenz and per Alivia she will be faxing the note today, she shows record that such note was already faxed to PCP's office.    Listened to patient's concerns, provided support, and encouraged Rosa to meet goals. Informed of the importance on reporting any new symptoms to prevent any health complications.    Discussed dietary modification such as portion control, meal choices, and timing of meals. Provided diabetic friendly food choices to consider when grocery shopping, recommended more whole grains, proteins, and vegetables.     Advised patient to focus on eating healthier meals, keep doctor's appointment, and increase exercise.     Continued to provide education on how to better manage and cope with chronic conditions. Informed of the importance on reporting any new symptoms to prevent any health complications.     Future Appointments:   Future Appointments   Date Time Provider Department Center   8/2/2025  8:00 AM [facility] RM1 [facility] EM [facility]   8/25/2025  2:50 PM DAFNE, MARKEL [id number] None   9/23/2025 10:20 AM Franny Kaur MD [facility]OBGYN EC [facility]   10/13/2025  8:20 AM [facility] Reese [id number]   10/27/2025  2:00 PM Gracy Pierre MD [facility] Care Manager Follow Up Date: in a month or sooner if needed.    Reason For Follow Up: review progress and or barriers towards patient's goals.     Time Spent This Encounter Total: 20 min medical record review, telephone communication, care plan updates where needed, education, goals, and action plan recreation/update. Provided acknowledgment and validation to patient's concerns.   Monthly Minute Total including today:  20  Physical assessment, complete health history, and need for CCM established by Carolann Horton MD.

## 2025-07-14 ENCOUNTER — PATIENT OUTREACH (OUTPATIENT)
Dept: CASE MANAGEMENT | Age: 65
End: 2025-07-14

## 2025-07-14 NOTE — PROGRESS NOTES
The patient is maintaining good health and is stable on his chronic conditions. Patient is being graduated from [facility] and can contact their PCP office if they are in need of more guidance in the future      Patient notified and agreeable to the plan.

## 2025-07-25 NOTE — TELEPHONE ENCOUNTER
Eleanor Holder presents to the [facility] on 7/25/2025. Eleanor is complaining of pain back. The pain is constant. The pain is described as aching, throbbing, shooting, stabbing, dull, and sharp. Pain is rated on her best day at a 7, on her worst day at a 10, and on average at a 9 on the VAS scale. She took her last dose of Norco and Neurontin today.     Any procedures since your last visit: No, w    Pacemaker or defibrillator: No     She is not on NSAIDS and is not on anticoagulation medications     Do you want someone present when the provider examines you? No    Medication Contract and Consent for Opioid Use Documents Filed       Patient Documents       Type of Document Status Date Received Received By Description    Medication Contract Received  EBER HONG Opioid medication contract with Dr Wheatley 3/24/20    Medication Contract Received 4/26/2018  3:50 PM ANYA NUNO PAIN MANAGEMENT PATIENT AGREEMENT 4/26/2018    Medication Contract Received 11/5/2020  4:23 PM EBER HONG Opioid medication contract with Dr Wheatley    Medication Contract Received 3/1/2021  1:26 PM EBER HONG Opioid medication contract with Dr Wheatley    Medication Contract Received 8/23/2021  2:03 PM HAYLIE CADENA Medication contract    Medication Contract Received 10/18/2021  3:03 PM HAYLIE CADENA medication contract 10/18/2021    Medication Contract Signed 10/4/2022 12:35 PM CRISTIANA BAÑUELOS Pain Med. Contract 10/04/22    Medication Contract Received 10/11/2023  3:46 PM KAILYN WILKINSON Medication contract    Consent Opioid Use Received 10/29/2024  1:39 PM JAMIE RIVAS Consent Opioid Use                    /75   Pulse 97   Temp 97.5 °F (36.4 °C) (Infrared)   Resp 16   Ht 1.651 m (5' 5\")   Wt 76.7 kg (169 lb)   LMP  (LMP Unknown)   SpO2 95%   BMI 28.12 kg/m²   Fax received confirming PA approval through 12/31/2019. Ref # [id number]    [facility] pharmacy contacted to update- pharmacy will contact pt when medication is ready for . relief.    Has failed MBB in the past.   RF Norco 5/325 BID     No RF Gabapentin 400 mg BID, RF 90 day supply  02/05/2025 right cervical paraspinal, trapezius, supraspinatus, and rhomboid trigger point injections under ultrasound guidance - 50% relief which is diminishing somewhat.   UDS at future visit  OARRS report reviewed   Patient encouraged to stay active and to lose weight. Failed physical therapy \"many times over the past 30 years\"  Treatment plan discussed with the patient including medications side effects        Controlled Substance Monitoring:    Acute and Chronic Pain Monitoring:   RX Monitoring Periodic Controlled Substance Monitoring   7/25/2025  12:03 PM Possible medication side effects, risk of tolerance/dependence & alternative treatments discussed.;No signs of potential drug abuse or diversion identified.;Assessed functional status (ability to engage in work or other purposeful activities, the pain intensity and its interference with activities of daily living, quality of family life and social activities, and the physical activity);Obtaining appropriate analgesic effect of treatment.             ccreferring physic

## 2025-08-02 ENCOUNTER — HOSPITAL ENCOUNTER (OUTPATIENT)
Dept: MAMMOGRAPHY | Facility: HOSPITAL | Age: 65
Discharge: HOME | End: 2025-08-02
Attending: FAMILY MEDICINE

## 2025-08-02 DIAGNOSIS — Z12.31 SCREENING MAMMOGRAM FOR BREAST CANCER: ICD-10-CM

## 2025-08-02 PROCEDURE — 77063 BREAST TOMOSYNTHESIS BI: CPT | Performed by: FAMILY MEDICINE

## 2025-08-02 PROCEDURE — 77067 SCR MAMMO BI INCL CAD: CPT | Performed by: FAMILY MEDICINE

## 2025-08-15 ENCOUNTER — TELEPHONE (OUTPATIENT)
Age: 65
End: 2025-08-15

## 2025-08-15 ENCOUNTER — TELEPHONE (OUTPATIENT)
Facility: CLINIC | Age: 65
End: 2025-08-15

## 2025-08-25 ENCOUNTER — HOSPITAL ENCOUNTER (OUTPATIENT)
Facility: HOSPITAL | Age: 65
Setting detail: HOSPITAL OUTPATIENT SURGERY
Discharge: HOME | End: 2025-08-25
Attending: INTERNAL MEDICINE | Admitting: INTERNAL MEDICINE

## 2025-08-25 ENCOUNTER — ANESTHESIA (OUTPATIENT)
Dept: ENDOSCOPY | Facility: HOSPITAL | Age: 65
End: 2025-08-25

## 2025-08-25 ENCOUNTER — ANESTHESIA EVENT (OUTPATIENT)
Dept: ENDOSCOPY | Facility: HOSPITAL | Age: 65
End: 2025-08-25

## 2025-08-25 VITALS
DIASTOLIC BLOOD PRESSURE: 65 MMHG | BODY MASS INDEX: 39.27 KG/M2 | HEART RATE: 58 BPM | OXYGEN SATURATION: 97 % | SYSTOLIC BLOOD PRESSURE: 150 MMHG | RESPIRATION RATE: 18 BRPM | WEIGHT: 200 LBS | HEIGHT: 60 IN

## 2025-08-25 DIAGNOSIS — Z12.11 COLON CANCER SCREENING: ICD-10-CM

## 2025-08-25 PROBLEM — K63.5 POLYP OF COLON: Status: ACTIVE | Noted: 2025-08-25

## 2025-08-25 PROCEDURE — 45380 COLONOSCOPY AND BIOPSY: CPT | Performed by: INTERNAL MEDICINE

## 2025-08-25 PROCEDURE — 45385 COLONOSCOPY W/LESION REMOVAL: CPT | Performed by: INTERNAL MEDICINE

## 2025-08-25 RX ORDER — SODIUM CHLORIDE, SODIUM LACTATE, POTASSIUM CHLORIDE, CALCIUM CHLORIDE 600; 310; 30; 20 MG/100ML; MG/100ML; MG/100ML; MG/100ML
INJECTION, SOLUTION INTRAVENOUS CONTINUOUS
Status: DISCONTINUED | OUTPATIENT
Start: 2025-08-25 | End: 2025-08-25

## 2025-08-25 RX ORDER — NALOXONE HYDROCHLORIDE 0.4 MG/ML
0.08 INJECTION, SOLUTION INTRAMUSCULAR; INTRAVENOUS; SUBCUTANEOUS ONCE AS NEEDED
Status: DISCONTINUED | OUTPATIENT
Start: 2025-08-25 | End: 2025-08-25

## 2025-08-25 RX ORDER — LIDOCAINE HYDROCHLORIDE 10 MG/ML
INJECTION, SOLUTION EPIDURAL; INFILTRATION; INTRACAUDAL; PERINEURAL AS NEEDED
Status: DISCONTINUED | OUTPATIENT
Start: 2025-08-25 | End: 2025-08-25 | Stop reason: SURG

## 2025-08-25 RX ADMIN — SODIUM CHLORIDE, SODIUM LACTATE, POTASSIUM CHLORIDE, CALCIUM CHLORIDE: 600; 310; 30; 20 INJECTION, SOLUTION INTRAVENOUS at 11:28:00

## 2025-08-25 RX ADMIN — LIDOCAINE HYDROCHLORIDE 50 MG: 10 INJECTION, SOLUTION EPIDURAL; INFILTRATION; INTRACAUDAL; PERINEURAL at 11:31:00

## 2025-08-27 ENCOUNTER — TELEPHONE (OUTPATIENT)
Facility: CLINIC | Age: 65
End: 2025-08-27

## (undated) DIAGNOSIS — Z79.82 LONG TERM CURRENT USE OF ASPIRIN: ICD-10-CM

## (undated) DEVICE — AMBIENT SUPER TURBOVAC 90 IFS: Brand: COBLATION

## (undated) DEVICE — TUBING SCT CLR 6FT .25IN MDVC

## (undated) DEVICE — Device

## (undated) DEVICE — KIT ENDO ORCAPOD 160/180/190

## (undated) DEVICE — ARTHROSCOPY: Brand: MEDLINE INDUSTRIES, INC.

## (undated) DEVICE — ZIMMER® STERILE DISPOSABLE TOURNIQUET CUFF WITH PLC, DUAL PORT, SINGLE BLADDER, 30 IN. (76 CM)

## (undated) DEVICE — BLADE SHVR COOLCUT 13CM 4MM

## (undated) DEVICE — TUBING IRR 16FT CNT WV 3 ASCP

## (undated) DEVICE — 3 ML SYRINGE LUER-LOCK TIP: Brand: MONOJECT

## (undated) DEVICE — STERILE LATEX POWDER-FREE SURGICAL GLOVESWITH NITRILE COATING: Brand: PROTEXIS

## (undated) DEVICE — KIT MFLD FOR SPEC COLL

## (undated) DEVICE — SOL  .9 3000ML

## (undated) DEVICE — KIT CLEAN ENDOKIT 1.1OZ GOWNX2

## (undated) DEVICE — 3M™ STERI-STRIP™ REINFORCED ADHESIVE SKIN CLOSURES, R1547, 1/2 IN X 4 IN (12 MM X 100 MM), 6 STRIPS/ENVELOPE: Brand: 3M™ STERI-STRIP™

## (undated) DEVICE — SUTURE MONOCRYL 4-0 Y845G

## (undated) DEVICE — STERILE TETRA-FLEX CF, ELASTIC BANDAGE, 4" X 5.5YD: Brand: TETRA-FLEX™CF

## (undated) DEVICE — SUCTION CANISTER, 3000CC,SAFELINER: Brand: DEROYAL

## (undated) NOTE — LETTER
6/7/2019              Moris Wei 16         Dear Akila Barakat,    Your mammogram was negative / normal. The next one is due in one year.  I encourage you to still do monthly self breast exam (best time if you g

## (undated) NOTE — LETTER
AUTHORIZATION FOR SURGICAL OPERATION OR OTHER PROCEDURE    1.  I hereby authorize Desi Ortiz PA-C and [facility] staff assigned to my case to perform the following operation and/or procedure at the [facility]:    _________________________ Time:  ________ A. M.  P.M.        Patient Name:  ______________________________________________________  (please print)      Patient signature:  ___________________________________________________             Relationship to Patient:           []  Parent

## (undated) NOTE — MR AVS SNAPSHOT
Bushra Burns [address]               Thank you for choosing us for your health care visit with Juli Orozco PT. We are glad to serve you and happy to provide you with this summary of your visit. 900 [facility], [address] ([address])    15[facility], [address]  [address] [facility] [address] [phone]            Aug 15, 2017  9:45 AM   Non Surgical Consult with Ny Polanco MD   [address], [address], [address]

## (undated) NOTE — MR AVS SNAPSHOT
[address]  [phone]               Thank you for choosing us for your health care visit with Alexsandra Gutierrez MD.  We are glad to serve you and happy to provide you with this summary office. At that time, you will be provided with any authorization numbers or be assured that none are required. You can then schedule your appointment. Failure to obtain required authorization numbers can create reimbursement difficulties for you.     Assoc Pravastatin Sodium 20 MG Tabs   TAKE 1 TABLET BY MOUTH NIGHTLY. Commonly known as:  PRAVACHOL           Vitamin B-12 1000 MCG Tabs   Take  by mouth.  take 1 tablet by oral mouth daily   Commonly known as:  VITAMIN B12                   MyChart     Visit Get your heart pumping – brisk walking, biking, swimming Even 10 minute increments are effective and add up over the week   2 ½ hours per week – spread out over time Use a rosalba to keep you motivated   Don’t forget strength training with weights and resist

## (undated) NOTE — LETTER
July 25, 2024    Rosa Kahlil  [address] [place] [address]    Querida Rosa:  Fue un placer hablar con usted por teléfono recientemente. Para el seguimiento, quería enviarle mi información de contacto para utilizar cuando usted tiene aaliyah pregunta y / o necesita alguna ayuda. Estamos muy contentos de que haya decidido darle un intento a nuestro programa de Gestión de Cuidado Crónico. Estoy disponible para proporcionarle el apoyo y la educación necesarios para mantenerlo valentino.  Juntos trabajaremos en:  Coordinación de la atención: ayuda a reducir pedidos / pruebas duplicados para ahorrar tiempo y dinero  Medicamentos: revisar, educar y discutir cualquier preocupación o problema que pueda tener  Educación personalizada y apoyo con respecto a moncada rajat.  Estos servicios, entre otros, le ayudarán a ligia el control de moncada rajat ya proporcionarle aaliyah atención de calidad óptima. He adjuntado aaliyah revisión más en profundidad del programa para ayudar a esbozar la información que habíamos hablado por teléfono. Si tiene alguna pregunta no dude en ponerse en contacto conmigo mismo y moncada compañía de seguros para obtener más detalles.  Espero con interés trabajar con usted,  Lisa Watts, JANNETH [facility]  Lisa Watts  Health  [facility]  [phone] [email or web address]

## (undated) NOTE — MR AVS SNAPSHOT
Bushra Burns [address]               Thank you for choosing us for your health care visit with Promise Dos Santos PT. We are glad to serve you and happy to provide you with this summary of your visit. 900 [facility] ([facility])    [address] [place] [address], [address] [place] [address] 05 503            Jul 06, 2017  3:15 PM   [facility] Visit By Therapist with Bhupinder Cano, [address] [place] [address]

## (undated) NOTE — Clinical Note
3/11/2017    Patient: Мария Hancock   MR Number: [id number]   YOB: 1960   Date of Visit: 3/11/2017   Physician: Gudelia Melo MD     Dear Medicare Patient:  Lorna Smith TO BENEFICIARY:  Please know that while a refraction is impo

## (undated) NOTE — ED AVS SNAPSHOT
[facility] AND CLINICS Immediate Care in [facility]. [address] [facility]    Phone:  [phone]    Fax:  [phone]           Mrs. Ronda Patricio   MRN: [id number]    Department:  [facility] AND [facility] Immediate Care in [facility] not participate in your health insurance plan. This may result in a lower benefit level being available to you or other limited reimbursement.   The physician may seek payment directly from you for amounts other than your deductible, co-payment, or co-insu prescription right away and begin taking the medication(s) as directed.   If you believe that any of the medications or instructions on this list is different from what your Primary Care doctor has instructed you - please continue to take your medications a - If you don’t have insurance, Abbey Abarca has partnered with Patient [address] to help you get signed up for insurance coverage.   Patient Yovanny [address]uc[address] is a Federal Navigator program that can help with your Affordable Care Act cover

## (undated) NOTE — MR AVS SNAPSHOT
[facility]  [address] [place] [address], [address] [facility]               Thank you for choosing us for your health care visit with Monet Root MD.  We are glad to serve you and happy to provide you with this summary What changed:    - medication strength  - additional instructions   Commonly known as:  NEURONTIN           Metoprolol Succinate ER 25 MG Tb24   TAKE 1 TABLET BY MOUTH ONCE DAILY. Commonly known as:   Toprol XL           Pantoprazole Sodium 40 MG Tbec   T DASH eating plan Adopt a diet rich in fruits, vegetables, and low fat dairy products with reduced content of saturated and total fat.    Dietary sodium reduction Reduce dietary sodium intake to <= 100 mmol per day (2.4 g sodium or 6 g sodium chloride)   Aer

## (undated) NOTE — MR AVS SNAPSHOT
After Visit Summary   9/22/2020    Ronda Patricio    MRN: [id number]           Visit Information     Date & Time  9/22/2020  1:00 PM Provider  Suman Gore MD [address] [place] [address], [address] [place] [address],[address], [employer]  Phone  [phone] [facility] LOVE 2D+3D SCREENING BILAT (CPT=77067/47119) [COMBO CPT(R)]  9/22/2020 9/22/2021    THINPREP PAP SMEAR B [[id number] CUSTOM]  9/22/2020 9/22/2021      Future Appointments        Provider Department    10/6/2020 11:40 AM Sharee Arteagaophilus Box HOW TO GET STARTED: HOW TO STAY MOTIVATED:   Start activities slowly and build up over time Do what you like   Get your heart pumping – brisk walking, biking, swimming Even 10 minute increments are effective and add up over the week   2 ½ hours per week – Treatment for mild illness or injury that does not require immediate attention.  Average cost  $70*   [facility]  Monday – Friday  8:00 am – 8:00 pm   Saturday – Sunday  8:00 am – 4:00 pm    WALK-IN CARE  Primary Care

## (undated) NOTE — ED AVS SNAPSHOT
Mrs. Carmella Ibrahim   MRN: [id number]    Department:  [facility] Emergency Department   Date of Visit:  3/23/2018           Disclosure     Insurance plans vary and the physician(s) referred by the ER may not be covered by your plan.  Please conta within the next three months to obtain basic health screening including reassessment of your blood pressure.     IF THERE IS ANY CHANGE OR WORSENING OF YOUR CONDITION, CALL YOUR PRIMARY CARE PHYSICIAN AT ONCE OR RETURN IMMEDIATELY TO THE EMERGENCY DEPARTMEN

## (undated) NOTE — Clinical Note
March 11, 2017    Donald Manzanares MD  [facility]     Patient: Venessa Holliday   YOB: 1960   Date of Visit: 3/11/2017       Dear Dr. Andrei Crystal MD:    Thank you for referring Venessa Holliday to me for evaluation.  Here Social History:   Smoking Status: Never Smoker                      Smokeless Status: Never Used                        Alcohol Use: No                Medications:    Current Outpatient Prescriptions:  PANTOPRAZOLE SODIUM 40 MG Oral Tab EC TAKE 1 TABLET BY Visual Fields      Left Right   Result Full Full         Extraocular Movement      Right Left   Result Full, Ortho Full, Ortho         Dilation     Both eyes:  1.0% Mydriacyl and 2.5% Manolo Synephrine @ 9:02 AM            Slit Lamp and Fundus Exam     Slit L age appropriate and they are not surgical at this time. No treatment recommended at this time. No orders of the defined types were placed in this encounter.        Meds This Visit:    No prescriptions requested or ordered in this encounter     Follow

## (undated) NOTE — LETTER
[address]  [address] [facility]  Dept: [phone]  Dept Fax: [phone]  Loc: [phone]      March 4, 2017    Patient: Radha Abdalla   Date of Visit: 3/4/2017       To Whom It May Concern:    Rafi Valencia

## (undated) NOTE — MR AVS SNAPSHOT
[facility]  [address] [place] [address], [address] [facility]  Celia Arias               Thank you for choosing us for your health care visit with Nathaly Moss MD.  We are glad to serve you and happy to provide you with this summary welcome for most exams. [facility]  Diagnostics Main [facility] Parking) (Yellow Parking)  [address]   [address] [place] Pereyra [facility], [facility] Elida Knutson MD   [address] [address]   Phone:  [phone]   Fax:  [phone]    Diagnoses:  Primary osteoarthritis involving multiple joints   Order:  Physical Therapy - Internal        Comment:  Treatment: Evaluate & Number of visits: 3    Assoc Dx:  Primary osteoarthritis involving multiple joints [M15.0]          Reason for Today's Visit     Osteoarthritis     Joint Pain           Medical Issues Discussed Today     Fibromyalgia    Osteoarthritis    Chronic pain of monica Call [phone] for help. [employer] is NOT to be used for urgent needs. For medical emergencies, dial 911.            Visit [facility] online at  [email or web address]

## (undated) NOTE — MR AVS SNAPSHOT
Donte  [address]  [phone]               Thank you for choosing us for your health care visit with Iker Quinn MD.  We are glad to serve you and happy to provide you with this summa Put 1 drop in both eyes every hours while awake for 1-2 days           ASPIR-81 81 MG Tbec   Generic drug:  aspirin   Take  by mouth.  take 1 tablet (81MG)  by oral route  every day           Ciclopirox 8 % Soln   APPLY 1 APPLICATION TOPICALLY 3 (THREE) CASS

## (undated) NOTE — MR AVS SNAPSHOT
Donte  [address]  [phone]               Thank you for choosing us for your health care visit with Ketty Epps MD.  We are glad to serve you and happy to provide you with this summary Assoc Dx:   Morbid obesity, unspecified obesity type ([facility].) [E66.01]          Reason for Today's Visit     Sore Throat     Itchy Eye           Medical Issues Discussed Today     Environmental allergies    -  Primary    Morbid obesity, unspecified obesity ty view more details from this visit by going to [email or web address]. If you've recently had a stay at the Hospital you can access your discharge instructions in [facility] by going to Visits < Admission Summaries.  If you've been to the Emergency Depar

## (undated) NOTE — ED AVS SNAPSHOT
Mrs. Harmony Rankin   MRN: [id number]    Department:  [facility] Emergency Department   Date of Visit:  9/17/2018           Disclosure     Insurance plans vary and the physician(s) referred by the ER may not be covered by your plan.  Please conta within the next three months to obtain basic health screening including reassessment of your blood pressure.     IF THERE IS ANY CHANGE OR WORSENING OF YOUR CONDITION, CALL YOUR PRIMARY CARE PHYSICIAN AT ONCE OR RETURN IMMEDIATELY TO THE EMERGENCY DEPARTMEN